# Patient Record
Sex: FEMALE | Race: WHITE | NOT HISPANIC OR LATINO | Employment: FULL TIME | URBAN - METROPOLITAN AREA
[De-identification: names, ages, dates, MRNs, and addresses within clinical notes are randomized per-mention and may not be internally consistent; named-entity substitution may affect disease eponyms.]

---

## 2017-01-17 ENCOUNTER — HOSPITAL ENCOUNTER (OUTPATIENT)
Dept: PHYSICAL THERAPY | Facility: CLINIC | Age: 62
Setting detail: THERAPIES SERIES
End: 2017-01-17
Attending: INTERNAL MEDICINE
Payer: COMMERCIAL

## 2017-01-17 PROCEDURE — 40000449 ZZHC STATISTIC PT VISIT, LYMPHEDEMA: Performed by: PHYSICAL THERAPIST

## 2017-01-17 PROCEDURE — 97535 SELF CARE MNGMENT TRAINING: CPT | Mod: GP | Performed by: PHYSICAL THERAPIST

## 2017-01-17 PROCEDURE — 97140 MANUAL THERAPY 1/> REGIONS: CPT | Mod: GP | Performed by: PHYSICAL THERAPIST

## 2017-01-17 PROCEDURE — 97110 THERAPEUTIC EXERCISES: CPT | Mod: GP | Performed by: PHYSICAL THERAPIST

## 2017-01-20 NOTE — ADDENDUM NOTE
Encounter addended by: Ira You PT on: 1/20/2017  7:43 AM<BR>     Documentation filed: Inpatient Document Flowsheet

## 2017-02-05 NOTE — PROGRESS NOTES
MEDICAL ONCOLOGY FOLLOW-UP PATIENT VISIT     NAME: Remedios Osorio     DATE: 2/6/2017    PRIMARY CARE PHYSICIAN: Kae Caal    PATIENT ID: Clinical Stage II-B Breast Cancer    Oncology History: Remedios Osorio is a 61 year old female with history of obesity, prediabetes and iron deficiency anemia with stage IIIa, Y1bE4xL2, ER positive, MA positive, HER2 non-amplified invasive ductal carcinoma of the left breast. Her PCP palpated a breast mass in the lower outer left breast in 01/2016. Diagnostic mammogram and ultrasound showed a hyperdense mass at 4:30, 8 cm from the nipple, measuring 3.3 cm on mammogram and 2.1 cm on ultrasound. She was also found to have multiple abnormal left axillary lymph nodes. The largest one was 2.4 cm. Core needle biopsy of her left breast mass demonstrated invasive ductal carcinoma, grade 3 with extensive tumor necrosis. Axillary LN biopsy was also positive for malignancy, consistent with breast primary. The tumor cells are strongly positive for estrogen receptor (> 95%) and are moderate to strongly positive for progesterone receptor (60-70%). HER2/lizz was non-amplified by FISH.  PET/CT showed the left breast mass, 5 hypermetabolic left axillary lymph nodes, indeterminate left cervical chain lymph nodes, and an indeterminate hypodensity in the dome of the liver, but was negative for distant metastasis. She declined enrollment in the I-SPY2 clinical trial and agreed to proceed with neoadjuvant chemotherapy, she received 11 cycles of weekly Taxol, the 12th was cancelled due to neuropathy. She received one cycle of ddAC but did not tolerate this well due to fatigue and generalized weakness. She declined any further chemotherapy.    Left breast lumpectomy and left axillary lymph node dissection was performed by Dr. Amezcua on 6/30/16.  Pathology showed a residual 1.6 cm grade 2, IDC in the left breast.  Invasive surgical margin was negative by 3 mm.  There was moderate intratumoral  fibrosis, with final tumor cellularity of 30%.  Tumor infiltrating lymphocytes were estimated at 50%.  6/19 excised lymph nodes were involved with malignancy.  The largest lymph node metastasis measured 3.9 cm and had a 1 mm area of extranodal extension.  Minimal treatment related changes were noted in the lymph nodes.  Pathologic staging was [id number], or stage IIIa.  [facility] residual cancer burden was Class III.  I met with Remedios in clinic on 7/11/16.  Unfortunately she was not eligible for ARIADNA due to having received only 13 weeks of neoadjuvant chemotherapy.  We discussed the results of the CREATE-X study (adjuvant Xeloda in patient w/o CR after nate-adjuvant tx), however, after extensive discussion she ultimately declined additional adjuvant chemotherapy.  She completed radiation on 10/17/16.  She has been on letrozole since early 09/2016.    Interim History:   Remedios comes into clinic for routine 3 month breast cancer follow up.  She generally feels well.  In fact, she states that she feels the best she has felt for the past 2 years.  She continues on letrozole and is tolerating the medication well.  She denies significant hot flashes.  She states she is not sexually active and has not noted vaginal dryness.  She denies mood disturbances.  She denies lumps or masses of either breast.  She has completed lymphedema therapy.  She is routinely wearing a compression sleeve and performing exercises to reduce lymphedema.  She reports almost full ROM of her LUE.  She continues on a ketogenic diet, but is not as strict as prior.  She has cut all refined sugar out of her diet including white flour, having replaced it with either almond flour or coconut flour.  She states she lost 40 pounds initially, but over the last 3 months, has maintained stable weight.  She has chronic bilateral knee pain that improved somewhat, but not completely with weight loss.  She denies new bone or joint aches or pains.  She has no cough,  shortness of breath, or chest pain.  She denies abdominal complaints.  She has persistent neuropathy of both the hands and feet, stating that they get cold very easy.  The remainder of a 10 point review of systems is otherwise negative.    PAST MEDICAL HISTORY:   Past Medical History   Diagnosis Date     HYPERLIPIDEMIA       ABNL LIVER FUNC STUDY  W/ MONO  5/01     Infectious mononucleosis 5/01     MENORRHAGIA      CHR BLOOD LOSS ANEMIA DUE TO FIBROIDS 6/9/2005     UTERINE LEIOMYOMA  2/7/2003     HX MUCOUS POLYPS OF CERVIX  2000     MIGRAINE HEADACHES      MORBID OBESITY BMI 44.79 6/05 6/12/2005     ELEVATED HBA1C 6.2% 6/05 8/6/2005     Breast cancer (H)        PAST SURGICAL HISTORY:  Past Surgical History   Procedure Laterality Date     Hc biopsy/excis cervical lesion w/wo fulguration  08-15-00,12-     endocervical polypectomy     Hc tooth extraction w/forcep       wisdom teeth     Lumpectomy breast with sentinel node, combined Left 6/29/2016     Segmental mastectomy with axillary lymph node dissection     Remove port vascular access Right 6/29/2016     Procedure: REMOVE PORT VASCULAR ACCESS;  Surgeon: Gianna Amezcua MD;  Location: [facility]     [facility] surgical pathology  2005     UTERINE LEIOMYOMA  [D25.9]     MEDS:  Current Outpatient Prescriptions   Medication     order for DME     letrozole (FEMARA) 2.5 MG tablet     Green Tea, Camillia sinensis, (GREEN TEA PO)     No current facility-administered medications for this visit.     Facility-Administered Medications Ordered in Other Visits   Medication     lidocaine BUFFERED 1 % solution 10 mL       ALLERGIES:  Allergies   Allergen Reactions     Benadryl [Diphenhydramine] Other (See Comments)     Pt had severe hypotension, nausea and vasovagal type reaction to IV Benadryl.   Give PO only.      Cats      Keflex [Cephalexin]      rash        PHYSICAL EXAM:  /78 mmHg  Pulse 77  Temp(Src) 97.2  F (36.2  C) (Oral)  Resp 16  Wt 91.672 kg (202 lb 1.6  oz)  SpO2 99%  LMP 10/28/2009  LMP 10/28/2009  General:  Well appearing, well-nourished adult female in NAD.  HEENT:  Normocephalic.  Sclera anicteric.  MMM.  No lesions of the oropharynx.  Lymph:  No palpable cervical, supraclavicular, or axillary LAD.  Chest:  CTA bilaterally.  No wheezes or crackles.  CV:  RRR.  Nl S1 and S2.  No m/r/g.  Breast:  Bilateral breasts are of normal fibroglandular density.  There is enhancement of pores and hyperpigmentation of the left breast in the distribution of the radiation field.  There are no discretely palpable masses in either breast.  Bilateral nipples are everted.  No nipple discharge.  Abd:  Soft/NT/ND.  BSs normoactive.  No hepatosplenomegaly.  Ext:  No pitting edema of the bilateral lower extremities.  Pulses 2+ and symmetric.  Musculo:  Strength 5/5 throughout.  No notable lymphedema of either the LUE or left chest wall.  Neuro:  Cranial nerves grossly intact.  Psych:  Mood and affect appear normal.    LABS REVIEWED THIS VISIT:  2/6/17 Bilateral diagnostic mammogram:  FINDINGS:  No suspicious findings in either breast. There is a benign  appearing postsurgical scar in the left central breast with no  suspicious findings.     There is skin thickening in the left breast consistent with  postsurgical or postradiation changes.                                                                       IMPRESSION: BI-RADS CATEGORY: 2 - Benign Finding(s)    IMPRESSION/PLAN: Remedios Osorio is a 61 year old female with history of obesity, prediabetes and iron deficiency anemia with a stage IIIa, A2bC6X3, ER/WA positive, HER2 negative left sided breast cancer. She is s/p 11 weeks of neoadjuvant weekly taxol and one cycle of ddAC. She declined further chemotherapy due to side effects.  Left breast lumpectomy and ALND on 6/30/16 showed a residual 1.6 cm IDC in the left breast and 6/19 lymph nodes involved with malignancy.  She declined adjuvant Xeloda.  She completed radiation on  10/17/2016.  She has been on letrozole since early September, 2016.      #  Stage III breast cancer:  Remedios is 7 months out from excision of her left sided breast cancer.  She has been adjuvant letrozole for 5 months.  She is tolerating the medication remarkably well.  We plan to continue letrozole to complete a total of 10 years of endocrine therapy (through 09/2026).  There is no evidence of disease recurrence on either clinical breast exam or bilateral mammogram performed today.  She will return to clinic in 3 months for her next exam.  We will continue annual screening mammogram in February of each year.    We again reviewed lifestyle changes suggested to benefit breast cancer patients including 150 minutes of cardiovascular exercise per week, a diet low in saturated fat and refined sugar, maintaining a normal BMI, a daily baby aspirin, vitamin D supplementation, and reduced alcohol intake.  Remedios is currently adhering to all of these recommendations.  I recommended that she be seen in survivor clinic at this time in order to obtain a treatment summary, review potential long term complications of chemotherapy, and further discuss lifestyle measures shown to decrease risk of recurrence.    #  Neuropathy:  Noted initial improvement, but none in the past 3 months.  Take vitamin B6 100 mg PO daily.  Deep massage to the fingers and toes daily.  She is not interested in acupuncture.    #  Lymphedema:  She has been seen by lymphedema clinic.  Continues compressive therapy and daily exercises.    #  Bone Health:  DEXA bone density scan performed in 10/2016 showed a lowest T-score of -0.5.  She is at increased risk of bone loss on aromatase inhibitor therapy.  Recommended continuing to take calcium 1200 mg and vitamin D 1000 IU PO daily.  We will check a vitamin D level at the time of her next visit.  Also recommended that she walk a half hour daily.  Today we discussed that once every 6 month zometa has been shown to both  reduce the risk of osteoporosis and decrease breast cancer recurrence in the bones in the adjuvant treatment of postmenopausal women with estrogen receptor positive breast cancer.  We discussed the potential side effects of zometa.  She was not interested in zometa infusion.      #  At risk for hypothyroidism:  She is s/p supraclavicular radiation and therefore at risk for hypothyroidism.  Will check a TSH at the time of her next visit.    #  Anthracycline exposure:  Remedios is at low risk of long term complications from anthracycline exposure given that she only received a single cycle, nevertheless, will check yearly CBC x 5 years and repeat an echocardiogram at 5 years.    #  Follow Up:  Survivor clinic visit with ANGÉLICA Guthrie within 1-2 months.  Return to clinic in 3 months for labs and visit with me.    It was a pleasure to see Remedios in clinic today.  A total of 25 minutes of our 30 minute face to face visit was spent in counseling.

## 2017-02-06 ENCOUNTER — ONCOLOGY VISIT (OUTPATIENT)
Dept: ONCOLOGY | Facility: CLINIC | Age: 62
End: 2017-02-06
Attending: INTERNAL MEDICINE
Payer: COMMERCIAL

## 2017-02-06 VITALS
SYSTOLIC BLOOD PRESSURE: 145 MMHG | RESPIRATION RATE: 16 BRPM | BODY MASS INDEX: 35.15 KG/M2 | HEART RATE: 77 BPM | TEMPERATURE: 97.2 F | WEIGHT: 202.1 LBS | OXYGEN SATURATION: 99 % | DIASTOLIC BLOOD PRESSURE: 78 MMHG

## 2017-02-06 DIAGNOSIS — Z92.3 HISTORY OF THYROID IRRADIATION: ICD-10-CM

## 2017-02-06 DIAGNOSIS — E55.9 VITAMIN D DEFICIENCY: ICD-10-CM

## 2017-02-06 DIAGNOSIS — C50.512 MALIGNANT NEOPLASM OF LOWER-OUTER QUADRANT OF LEFT FEMALE BREAST (H): Primary | ICD-10-CM

## 2017-02-06 PROCEDURE — 99212 OFFICE O/P EST SF 10 MIN: CPT

## 2017-02-06 PROCEDURE — 99214 OFFICE O/P EST MOD 30 MIN: CPT | Mod: ZP | Performed by: INTERNAL MEDICINE

## 2017-02-06 PROCEDURE — 99211 OFF/OP EST MAY X REQ PHY/QHP: CPT | Mod: 25,ZF

## 2017-02-06 ASSESSMENT — PAIN SCALES - GENERAL: PAINLEVEL: NO PAIN (0)

## 2017-02-06 NOTE — Clinical Note
2/6/2017       RE: Remedios Osorio  [address] [id number]     Dear Colleague,    Thank you for referring your patient, Remedios Osorio, to the [facility]. Please see a copy of my visit note below.    MEDICAL ONCOLOGY FOLLOW-UP PATIENT VISIT     NAME: Remedios Osorio     DATE: 2/6/2017    PRIMARY CARE PHYSICIAN: Kae Caal    PATIENT ID: Clinical Stage II-B Breast Cancer    Oncology History: Remedios Osorio is a 61 year old female with history of obesity, prediabetes and iron deficiency anemia with stage IIIa, Q2mQ4iI5, ER positive, [place] positive, HER2 non-amplified invasive ductal carcinoma of the left breast. Her PCP palpated a breast mass in the lower outer left breast in 01/2016. Diagnostic mammogram and ultrasound showed a hyperdense mass at 4:30, 8 cm from the nipple, measuring 3.3 cm on mammogram and 2.1 cm on ultrasound. She was also found to have multiple abnormal left axillary lymph nodes. The largest one was 2.4 cm. Core needle biopsy of her left breast mass demonstrated invasive ductal carcinoma, grade 3 with extensive tumor necrosis. Axillary LN biopsy was also positive for malignancy, consistent with breast primary. The tumor cells are strongly positive for estrogen receptor (> 95%) and are moderate to strongly positive for progesterone receptor (60-70%). HER2/lizz was non-amplified by FISH.  PET/CT showed the left breast mass, 5 hypermetabolic left axillary lymph nodes, indeterminate left cervical chain lymph nodes, and an indeterminate hypodensity in the dome of the liver, but was negative for distant metastasis. She declined enrollment in the I-SPY2 clinical trial and agreed to proceed with neoadjuvant chemotherapy, she received 11 cycles of weekly Taxol, the 12th was cancelled due to neuropathy. She received one cycle of ddAC but did not tolerate this well due to fatigue and generalized weakness. She declined any further chemotherapy.    Left breast  lumpectomy and left axillary lymph node dissection was performed by Dr. Amezcua on 6/30/16.  Pathology showed a residual 1.6 cm grade 2, IDC in the left breast.  Invasive surgical margin was negative by 3 mm.  There was moderate intratumoral fibrosis, with final tumor cellularity of 30%.  Tumor infiltrating lymphocytes were estimated at 50%.  6/19 excised lymph nodes were involved with malignancy.  The largest lymph node metastasis measured 3.9 cm and had a 1 mm area of extranodal extension.  Minimal treatment related changes were noted in the lymph nodes.  Pathologic staging was [id number], or stage IIIa.  [facility] residual cancer burden was Class III.  I met with Remedios in clinic on 7/11/16.  Unfortunately she was not eligible for ARIADNA due to having received only 13 weeks of neoadjuvant chemotherapy.  We discussed the results of the CREATE-X study (adjuvant Xeloda in patient w/o CR after nate-adjuvant tx), however, after extensive discussion she ultimately declined additional adjuvant chemotherapy.  She completed radiation on 10/17/16.  She has been on letrozole since early 09/2016.    Interim History:   Remedios comes into clinic for routine 3 month breast cancer follow up.  She generally feels well.  In fact, she states that she feels the best she has felt for the past 2 years.  She continues on letrozole and is tolerating the medication well.  She denies significant hot flashes.  She states she is not sexually active and has not noted vaginal dryness.  She denies mood disturbances.  She denies lumps or masses of either breast.  She has completed lymphedema therapy.  She is routinely wearing a compression sleeve and performing exercises to reduce lymphedema.  She reports almost full ROM of her LUE.  She continues on a ketogenic diet, but is not as strict as prior.  She has cut all refined sugar out of her diet including white flour, having replaced it with either almond flour or coconut flour.  She states she lost 40  pounds initially, but over the last 3 months, has maintained stable weight.  She has chronic bilateral knee pain that improved somewhat, but not completely with weight loss.  She denies new bone or joint aches or pains.  She has no cough, shortness of breath, or chest pain.  She denies abdominal complaints.  She has persistent neuropathy of both the hands and feet, stating that they get cold very easy.  The remainder of a 10 point review of systems is otherwise negative.    PAST MEDICAL HISTORY:   Past Medical History   Diagnosis Date     HYPERLIPIDEMIA       ABNL LIVER FUNC STUDY  W/ MONO  5/01     Infectious mononucleosis 5/01     MENORRHAGIA      CHR BLOOD LOSS ANEMIA DUE TO FIBROIDS 6/9/2005     UTERINE LEIOMYOMA  2/7/2003     HX MUCOUS POLYPS OF CERVIX  2000     MIGRAINE HEADACHES      MORBID OBESITY BMI 44.79 6/05 6/12/2005     ELEVATED HBA1C 6.2% 6/05 8/6/2005     Breast cancer (H)        PAST SURGICAL HISTORY:  Past Surgical History   Procedure Laterality Date     Hc biopsy/excis cervical lesion w/wo fulguration  08-15-00,12-     endocervical polypectomy     Hc tooth extraction w/forcep       wisdom teeth     Lumpectomy breast with sentinel node, combined Left 6/29/2016     Segmental mastectomy with axillary lymph node dissection     Remove port vascular access Right 6/29/2016     Procedure: REMOVE PORT VASCULAR ACCESS;  Surgeon: Gianna Amezcua MD;  Location:  OR     [facility] surgical pathology  2005     UTERINE LEIOMYOMA  [D25.9]     MEDS:  Current Outpatient Prescriptions   Medication     order for DME     letrozole (FEMARA) 2.5 MG tablet     Green Tea, Camillia sinensis, (GREEN TEA PO)     No current facility-administered medications for this visit.     Facility-Administered Medications Ordered in Other Visits   Medication     lidocaine BUFFERED 1 % solution 10 mL       ALLERGIES:  Allergies   Allergen Reactions     Benadryl [Diphenhydramine] Other (See Comments)     Pt had severe  hypotension, nausea and vasovagal type reaction to IV Benadryl.   Give PO only.      Cats      Keflex [Cephalexin]      rash        PHYSICAL EXAM:  /78 mmHg  Pulse 77  Temp(Src) 97.2  F (36.2  C) (Oral)  Resp 16  Wt 91.672 kg (202 lb 1.6 oz)  SpO2 99%  LMP 10/28/2009  LMP 10/28/2009  General:  Well appearing, well-nourished adult female in NAD.  HEENT:  Normocephalic.  Sclera anicteric.  MMM.  No lesions of the oropharynx.  Lymph:  No palpable cervical, supraclavicular, or axillary LAD.  Chest:  CTA bilaterally.  No wheezes or crackles.  CV:  RRR.  Nl S1 and S2.  No m/r/g.  Breast:  Bilateral breasts are of normal fibroglandular density.  There is enhancement of pores and hyperpigmentation of the left breast in the distribution of the radiation field.  There are no discretely palpable masses in either breast.  Bilateral nipples are everted.  No nipple discharge.  Abd:  Soft/NT/ND.  BSs normoactive.  No hepatosplenomegaly.  Ext:  No pitting edema of the bilateral lower extremities.  Pulses 2+ and symmetric.  Musculo:  Strength 5/5 throughout.  No notable lymphedema of either the LUE or left chest wall.  Neuro:  Cranial nerves grossly intact.  Psych:  Mood and affect appear normal.    LABS REVIEWED THIS VISIT:  2/6/17 Bilateral diagnostic mammogram:  FINDINGS:  No suspicious findings in either breast. There is a benign  appearing postsurgical scar in the left central breast with no  suspicious findings.     There is skin thickening in the left breast consistent with  postsurgical or postradiation changes.                                                                       IMPRESSION: BI-RADS CATEGORY: 2 - Benign Finding(s)    IMPRESSION/PLAN: Remedios Osorio is a 61 year old female with history of obesity, prediabetes and iron deficiency anemia with a stage IIIa, H3eI7P7, ER/MI positive, HER2 negative left sided breast cancer. She is s/p 11 weeks of neoadjuvant weekly taxol and one cycle of ddAC. She  declined further chemotherapy due to side effects.  Left breast lumpectomy and ALND on 6/30/16 showed a residual 1.6 cm IDC in the left breast and 6/19 lymph nodes involved with malignancy.  She declined adjuvant Xeloda.  She completed radiation on 10/17/2016.  She has been on letrozole since early September, 2016.      #  Stage III breast cancer:  Remedios is 7 months out from excision of her left sided breast cancer.  She has been adjuvant letrozole for 5 months.  She is tolerating the medication remarkably well.  We plan to continue letrozole to complete a total of 10 years of endocrine therapy (through 09/2026).  There is no evidence of disease recurrence on either clinical breast exam or bilateral mammogram performed today.  She will return to clinic in 3 months for her next exam.  We will continue annual screening mammogram in February of each year.    We again reviewed lifestyle changes suggested to benefit breast cancer patients including 150 minutes of cardiovascular exercise per week, a diet low in saturated fat and refined sugar, maintaining a normal BMI, a daily baby aspirin, vitamin D supplementation, and reduced alcohol intake.  Remedios is currently adhering to all of these recommendations.  I recommended that she be seen in survivor clinic at this time in order to obtain a treatment summary, review potential long term complications of chemotherapy, and further discuss lifestyle measures shown to decrease risk of recurrence.    #  Neuropathy:  Noted initial improvement, but none in the past 3 months.  Take vitamin B6 100 mg PO daily.  Deep massage to the fingers and toes daily.  She is not interested in acupuncture.    #  Lymphedema:  She has been seen by lymphedema clinic.  Continues compressive therapy and daily exercises.    #  Bone Health:  DEXA bone density scan performed in 10/2016 showed a lowest T-score of -0.5.  She is at increased risk of bone loss on aromatase inhibitor therapy.  Recommended  continuing to take calcium 1200 mg and vitamin D 1000 IU PO daily.  We will check a vitamin D level at the time of her next visit.  Also recommended that she walk a half hour daily.  Today we discussed that once every 6 month zometa has been shown to both reduce the risk of osteoporosis and decrease breast cancer recurrence in the bones in the adjuvant treatment of postmenopausal women with estrogen receptor positive breast cancer.  We discussed the potential side effects of zometa.  She was not interested in zometa infusion.      #  At risk for hypothyroidism:  She is s/p supraclavicular radiation and therefore at risk for hypothyroidism.  Will check a TSH at the time of her next visit.    #  Anthracycline exposure:  Remedios is at low risk of long term complications from anthracycline exposure given that she only received a single cycle, nevertheless, will check yearly CBC x 5 years and repeat an echocardiogram at 5 years.    #  Follow Up:  Survivor clinic visit with ANGÉLICA Guthrie within 1-2 months.  Return to clinic in 3 months for labs and visit with me.    It was a pleasure to see Remedios in clinic today.  A total of 25 minutes of our 30 minute face to face visit was spent in counseling.      Again, thank you for allowing me to participate in the care of your patient.      Sincerely,    Kanchan Patel MD

## 2017-02-06 NOTE — MR AVS SNAPSHOT
After Visit Summary   2/6/2017    Remedios Osorio    MRN: [id number]           Patient Information     Date Of Birth          1955        Visit Information        Provider Department      2/6/2017 1:45 PM Kanchan Patel MD [facility]        Today's Diagnoses     Malignant neoplasm of lower-outer quadrant of left female breast (H)    -  1     Vitamin D deficiency         History of thyroid irradiation            Follow-ups after your visit        Your next 10 appointments already scheduled     Apr 11, 2017  8:00 AM   Treatment 60 with Ira You, PT   [facility] [place] [facility] ([facility], [facility])    [address]  1st Floor  [address]  [facility] [address] [phone]            May 08, 2017  2:45 PM   [facility] Lab Draw with  Lol[employer] LAB DRAW   [facility] ([facility])    [address]  2nd Floor  [facility] [address] [phone]            May 08, 2017  3:15 PM   (Arrive by 3:00 PM)   Return Visit with Kanchan Patel MD   [facility] ([facility])    [address]  2nd Floor  [facility] [id number] [phone]            May 17, 2017  3:30 PM   Return Visit with Niesha Anne MD   Radiation Oncology Clinic ([facility])    [facility]  1st Floor  [address] [facility] [address] [phone]              Future tests that were ordered for you today     Open Future Orders        Priority Expected Expires Ordered    CBC with platelets differential Routine 5/8/2017 2/5/2018 2/5/2017    Comprehensive metabolic panel Routine 5/8/2017 2/5/2018 2/5/2017    Vitamin D deficiency screening Routine 5/8/2017 2/5/2018 2/5/2017    TSH with free T4 reflex Routine 5/8/2017 2/5/2018 2/5/2017            Who to contact     If you have questions or  need follow up information about today's clinic visit or your schedule please contact [facility] directly at [phone].  Normal or non-critical lab and imaging results will be communicated to you by Diagnovushart, letter or phone within 4 business days after the clinic has received the results. If you do not hear from us within 7 days, please contact the clinic through Diagnovushart or phone. If you have a critical or abnormal lab result, we will notify you by phone as soon as possible.  Submit refill requests through [employer] or call your pharmacy and they will forward the refill request to us. Please allow 3 business days for your refill to be completed.          Additional Information About Your Visit        [employer]ovushar[employer]ee[employer] Information     [employer] gives you secure access to your electronic health record. If you see a primary care provider, you can also send messages to your care team and make appointments. If you have questions, please call your primary care clinic.  If you do not have a primary care provider, please call [phone] and they will assist you.        Care EveryWhere ID     This is your Care EveryWhere ID. This could be used by other organizations to access your [facility] [place] medical records  [id number]        Your Vitals Were     Pulse Temperature Respirations Pulse Oximetry Last Period       77 97.2  F (36.2  C) (Oral) 16 99% 10/28/2009        Blood Pressure from Last 3 Encounters:   02/06/17 145/78   11/16/16 126/75   11/08/16 116/71    Weight from Last 3 Encounters:   02/06/17 91.672 kg (202 lb 1.6 oz)   11/16/16 92.307 kg (203 lb 8 oz)   11/08/16 92.897 kg (204 lb 12.8 oz)                 Today's Medication Changes          These changes are accurate as of: 2/6/17  2:28 PM.  If you have any questions, ask your nurse or doctor.               Stop taking these medicines if you haven't already. Please contact your care team if you have questions.     GREEN TEA PO   Stopped by:  Kanchan Patel  MD Michelle                    Primary Care Provider Office Phone # Fax #    Kae Luis Caal [phone]       [facility] [address] [place] [id number]        Thank you!     Thank you for choosing [facility]  for your care. Our goal is always to provide you with excellent care. Hearing back from our patients is one way we can continue to improve our services. Please take a few minutes to complete the written survey that you may receive in the mail after your visit with us. Thank you!             Your Updated Medication List - Protect others around you: Learn how to safely use, store and throw away your medicines at www.disposemymeds.org.          This list is accurate as of: 2/6/17  2:28 PM.  Always use your most recent med list.                   Brand Name Dispense Instructions for use    letrozole 2.5 MG tablet    FEMARA    30 tablet    Take 1 tablet (2.5 mg) by mouth daily       order for DME     1 each    Equipment being ordered: Sit Stand Desk

## 2017-02-06 NOTE — NURSING NOTE
"Remedios Osorio is a 61 year old female who presents for:  Chief Complaint   Patient presents with     Oncology Clinic Visit     Return: Breast ca         Initial Vitals:  /78 mmHg  Pulse 77  Temp(Src) 97.2  F (36.2  C) (Oral)  Resp 16  Wt 91.672 kg (202 lb 1.6 oz)  SpO2 99%  LMP 10/28/2009 Estimated body mass index is 35.15 kg/(m^2) as calculated from the following:    Height as of 10/10/16: 1.615 m (5' 3.58\").    Weight as of this encounter: 91.672 kg (202 lb 1.6 oz).. There is no height on file to calculate BSA. BP completed using cuff size: large  No Pain (0) Patient's last menstrual period was 10/28/2009. Allergies and medications reviewed.     Medications: Medication refills not needed today.  Pharmacy name entered into Storypanda:    [facility] - [place], [place] - [address].  [id number] - [place], [place] - [address]  [facility] [address] [place], [place] - [address] [facility] [place] - [address] [facility] [place], [place] - [address] [facility] [place], [place] - [address]  [facility] [place], [place] - [address] [facility]    Comments: Patient was offered a flu vaccine today but declined.      6 minutes for nursing intake (face to face time)   Leonor Chandler CMA          "

## 2017-03-30 ENCOUNTER — ONCOLOGY VISIT (OUTPATIENT)
Dept: ONCOLOGY | Facility: CLINIC | Age: 62
End: 2017-03-30
Attending: PHYSICIAN ASSISTANT
Payer: COMMERCIAL

## 2017-03-30 VITALS
WEIGHT: 211.1 LBS | DIASTOLIC BLOOD PRESSURE: 84 MMHG | OXYGEN SATURATION: 98 % | BODY MASS INDEX: 36.71 KG/M2 | SYSTOLIC BLOOD PRESSURE: 119 MMHG | HEART RATE: 99 BPM | TEMPERATURE: 99 F | RESPIRATION RATE: 16 BRPM

## 2017-03-30 DIAGNOSIS — C50.512 MALIGNANT NEOPLASM OF LOWER-OUTER QUADRANT OF LEFT FEMALE BREAST (H): Primary | ICD-10-CM

## 2017-03-30 PROCEDURE — 99212 OFFICE O/P EST SF 10 MIN: CPT | Mod: ZF

## 2017-03-30 PROCEDURE — 99215 OFFICE O/P EST HI 40 MIN: CPT | Mod: ZP | Performed by: PHYSICIAN ASSISTANT

## 2017-03-30 ASSESSMENT — PAIN SCALES - GENERAL: PAINLEVEL: NO PAIN (0)

## 2017-03-30 NOTE — MR AVS SNAPSHOT
After Visit Summary   3/30/2017    Remedios Osorio    MRN: [id number]           Patient Information     Date Of Birth          1955        Visit Information        Provider Department      3/30/2017 9:30 AM Dary Schultz PA-C [facility]        Today's Diagnoses     Malignant neoplasm of lower-outer quadrant of left female breast (H)    -  1       Follow-ups after your visit        Your next 10 appointments already scheduled     Apr 11, 2017  8:00 AM CDT   Treatment 60 with Ira You, PT   [facility] [place] [facility] ([facility], [facility])    [address]  1st Floor  [address]  [facility] [id number] [phone]            May 08, 2017  2:45 PM CDT   [facility] Lab Draw with  2[employer] LAB DRAW   [facility] Lab Draw ([facility])    [address]  2nd Floor  [facility] [id number] [phone]            May 08, 2017  3:15 PM CDT   (Arrive by 3:00 PM)   Return Visit with Kanchan Patel MD   [facility] ([facility])    [address]  2nd Floor  [facility] [id number] [phone]            May 17, 2017  3:30 PM CDT   Return Visit with Niesha Anne MD   Radiation Oncology Clinic ([facility])    General acute hospital  1st Floor  [address] [facility] [id number] [phone]              Who to contact     If you have questions or need follow up information about today's clinic visit or your schedule please contact [facility] directly at [phone].  Normal or non-critical lab and imaging results will be communicated to you by MyChart, letter or phone within 4 business days after the clinic has received the results. If you do not hear from us within 7 days, please contact the clinic through MyChart or phone. If you have a critical or abnormal lab  result, we will notify you by phone as soon as possible.  Submit refill requests through [employer] or call your pharmacy and they will forward the refill request to us. Please allow 3 business days for your refill to be completed.          Additional Information About Your Visit        Chang[employer] Information     [employer] gives you secure access to your electronic health record. If you see a primary care provider, you can also send messages to your care team and make appointments. If you have questions, please call your primary care clinic.  If you do not have a primary care provider, please call [phone] and they will assist you.        Care EveryWhere ID     This is your Care EveryWhere ID. This could be used by other organizations to access your [facility] medical records  [id number]        Your Vitals Were     Pulse Temperature Respirations Last Period Pulse Oximetry BMI (Body Mass Index)    99 99  F (37.2  C) (Oral) 16 10/28/2009 98% 36.71 kg/m2       Blood Pressure from Last 3 Encounters:   03/30/17 119/84   02/06/17 145/78   11/16/16 126/75    Weight from Last 3 Encounters:   03/30/17 95.8 kg (211 lb 1.6 oz)   02/06/17 91.7 kg (202 lb 1.6 oz)   11/16/16 92.3 kg (203 lb 8 oz)              Today, you had the following     No orders found for display       Primary Care Provider Office Phone # Fax #    Kae Luis Caal [phone]       [address] [address]        Thank you!     Thank you for choosing [facility]  for your care. Our goal is always to provide you with excellent care. Hearing back from our patients is one way we can continue to improve our services. Please take a few minutes to complete the written survey that you may receive in the mail after your visit with us. Thank you!             Your Updated Medication List - Protect others around you: Learn how to safely use, store and throw away your medicines at  www.disposemymeds.org.          This list is accurate as of: 3/30/17 11:59 PM.  Always use your most recent med list.                   Brand Name Dispense Instructions for use    letrozole 2.5 MG tablet    FEMARA    30 tablet    Take 1 tablet (2.5 mg) by mouth daily       order for DME     1 each    Equipment being ordered: Sit Stand Desk

## 2017-03-30 NOTE — NURSING NOTE
"Remedios Osorio is a 61 year old female who presents for:  Chief Complaint   Patient presents with     Oncology Clinic Visit     Breast CA        Initial Vitals:  /84  Pulse 99  Temp 99  F (37.2  C) (Oral)  Resp 16  Wt 95.8 kg (211 lb 1.6 oz)  LMP 10/28/2009  SpO2 98%  BMI 36.71 kg/m2 Estimated body mass index is 36.71 kg/(m^2) as calculated from the following:    Height as of 10/10/16: 1.615 m (5' 3.58\").    Weight as of this encounter: 95.8 kg (211 lb 1.6 oz).. Body surface area is 2.07 meters squared. BP completed using cuff size: large  No Pain (0) Patient's last menstrual period was 10/28/2009. Allergies and medications reviewed.      Medications: Medication refills not needed today.  Pharmacy name entered into Moove In:    [facility] - [place], [place] - [address].  [id number] - [place], [place] - [address]  [id number] - [place], [place] - [address] [facility] [place] - [address] [facility] [place] [facility] [address] [facility] [place] - [place], [place] - [address]  [facility] - [place], [place] - [address] [facility]    Comments:     6 minutes for nursing intake (face to face time)   Mary Hennessy CMA        "

## 2017-03-30 NOTE — PROGRESS NOTES
REASON FOR VISIT:  I am seeing Ms. Osorio in the Cancer Survivorship Clinic for her diagnosis of left breast cancer in 2016.       The patient had noted a lump in her left breast.  She had a bilateral diagnostic mammogram with an ultrasound on 01/25/2016.  This showed an irregular shaped mass measuring 3.3 cm.  An ultrasound showed a 2.1-cm mass with enlarged left axillary lymph nodes.  Contrast mammogram on 02/03/2016 showed a 3-cm enhancing mass.  She had a biopsy of the left breast and axilla on 02/03/2016.  The left breast showed invasive mammary carcinoma, Bartlett grade 3, ER/MA-positive, HER2/lizz-negative.  The axilla was positive for metastatic cancer.  PET/CT scan on 02/20/2016 showed the left breast mass and lymph nodes, but no distant disease.  She was diagnosed with a clinical stage II (T2 N1) left breast cancer.  She received neoadjuvant weekly Taxol x11 from 03/04/2016 until 05/10/2016.  This was discontinued secondary to neuropathy.  She received 1 cycle of Adriamycin and Cytoxan on 05/25/2016 but not tolerate this.  Total accumulation dose of Adriamycin was 60 mg/m2.  She went on to have a left lumpectomy and left axillary lymph node dissection on 06/30/2016.  This revealed a 16-mm residual infiltrating ductal carcinoma, Florence grade 2.  Six out of 19 lymph nodes were positive.  There were clear margins.  She has RCB class III.  She received left breast and regional lymph node radiation, completing 6,040 cGy from 08/29/2016 to 10/17/2016.  She began letrozole in 09/2016.       Ms. Osorio states that she has noticed some slight edema near her left supraclavicular region.  She is getting out and walking 4 days a week.  She is walking 30-minute sessions.  She has been trying to lose weight on purpose and has changed her diet and began exercising.  She did note nausea about a week ago, but no vomiting.  The symptom has resolved at this time.  She does have joint aches and pains, most prominently in  her right hip and knees.  She also has some discomfort in her right neck but believes this is from lying on the right side.  She was getting migraines, but they have been better since menopause.  She is otherwise eating and drinking fine.  She denies any chest pain, shortness of breath, cough or abdominal pain.     CANCER TREATMENT SUMMARY:  *The patient had noted a lump in her left breast.    *Bilateral diagnostic mammogram with an ultrasound on 01/25/2016.  This showed an irregular shaped mass measuring 3.3 cm.  An ultrasound showed a 2.1-cm mass with enlarged left axillary lymph nodes.    *Contrast mammogram on 02/03/2016 showed a 3-cm enhancing mass.    *Biopsy of the left breast and axilla on 02/03/2016.  The left breast showed invasive mammary carcinoma, Florence grade 3, ER/OH-positive, HER2/lizz-negative.  The axilla was positive for metastatic cancer.    *PET/CT scan on 02/20/2016 showed the left breast mass and lymph nodes, but no distant disease.    *Neoadjuvant weekly Taxol x11 from 03/04/2016 until 05/10/2016.  This was discontinued secondary to neuropathy.    *1 cycle of Adriamycin and Cytoxan on 05/25/2016 but not tolerate this.   *Left lumpectomy and left axillary lymph node dissection on 06/30/2016.  This revealed a 16-mm residual infiltrating ductal carcinoma, Indianapolis grade 2.  Six out of 19 lymph nodes were positive.  There were clear margins.  She has RCB class III.    *Left breast and regional lymph node radiation, completing 6,040 cGy from 08/29/2016 to 10/17/2016.    *Letrozole 09/2016 to present.    MEDICATIONS:   Current Outpatient Prescriptions   Medication Sig Dispense Refill     order for DME Equipment being ordered: Sit Stand Desk 1 each 0     letrozole (FEMARA) 2.5 MG tablet Take 1 tablet (2.5 mg) by mouth daily 30 tablet 11       ALLERGIES:   Allergies   Allergen Reactions     Benadryl [Diphenhydramine] Other (See Comments)     Pt had severe hypotension, nausea and vasovagal type  reaction to IV Benadryl.   Give PO only.      Cats      Keflex [Cephalexin]      rash     FAMILY HISTORY:  Mother is  at age 88 from Alzheimer's.  Father is  at age 88 from congestive heart failure.  Cancer family history includes dad with prostate cancer.     PHYSICAL EXAM:  Vitals: /84  Pulse 99  Temp 99  F (37.2  C) (Oral)  Resp 16  Wt 95.8 kg (211 lb 1.6 oz)  LMP 10/28/2009  SpO2 98%  BMI 36.71 kg/m2  GENERAL:  A pleasant person in no acute distress.   HEENT:  Sclerae are nonicteric.    NECK:  Supple.   NEUROLOGICAL:  Alert/orientated/able to answer all questions.  CN grossly intact.  PSYCH:  Normal affect.  SKIN:  No suspicious lesions on areas of exposed skin.  BREAST:  Right breast normal to inspection, no masses.  Left breast, edema and hyperpigmentation secondary to treatment.  Well healed surgical incision in the 5 o'clock position.  No masses.   SKIN:  Slight edema in the area to the left of the thyroid region, no masses or adenopathy.    ASSESSMENT/PLAN:  I had the pleasure of seeing Ms. Osorio in the Cancer Survivorship Program given her left breast cancer.  Given her diagnosis and treatment, I gave her the following recommendations:     1.  Clinical stage II (T2 N1) invasive ductal carcinoma of the left breast, ER/NH-positive, HER2/lizz-negative.  She was treated as above with neoadjuvant chemotherapy, surgery, radiation and hormonal therapy.  Ms. Osorio continues to do well at this time.  She does not have any signs or symptoms that would suggest recurrence of her breast carcinoma.  She had her mammogram in 2017 which showed no concerns.  She understands that she will see Dr. Patel every 3-4 months for the first 2-3 years following her breast cancer diagnosis.  She already has a followup appointment scheduled with Dr. Patel in May.    2.  Coping.  Overall, she states that she is coping well with her breast cancer diagnosis.  She says she has a good support system  at home.    3.  Energy.  She has noticed a little less energy more recently but states that she had been back to baseline following the treatment for her breast cancer.  We will continue to follow at subsequent visits.  TSH is on order for May.    4.  Peripheral neuropathy.  She had this as a side effect from the Taxol chemotherapy.  It continues to be stable and involves numbness in her fingertips and from the balls of her feet to her toes.  She has no pain with the peripheral neuropathy.  She understands that this may be a permanent side effect from the chemotherapy.  She may try acupuncture.   5.  Lymphedema.  She does plan to see a lymphedema specialist in April or May.  She is not having any difficulty with lymphedema at this time.    6.  Cardiotoxicity.  She received 1 cycle of Adriamycin and left breast radiation.  She may be at risk for cardiovascular disease in the future.  I did ask her to establish with a primary care provider for aggressive management and treatment of her cholesterol, blood pressure and glucose.    7.  Bone health.  DEXA scan in 10/2016 was consistent with normal bone density.  Given the use of letrozole, we will obtain a bone density every 2 years.  I encouraged her to continue her weightbearing exercises 2-3 days a week.  Her calcium intake should be 2085-8380 mg along with vitamin D.     8.  Letrozole.  She does complain of some vaginal dryness.  She is not sexually active.  I suggested over-the-counter Replens, Luvena or Hyalo-Gyn to help with the vaginal dryness.  She has mild hot flashes, but they are tolerable.  She understands that her current joint aches and pains may be related to the letrozole, and she will continue to follow.  If they worsen, we could consider changing the aromatase inhibitor.   9.  Hypothyroidism.  Given that her radiation was near her thyroid region, Dr. Patel will be obtaining a TSH yearly.  We will plan to treat if she becomes hypothyroid.    10.  Risk  of a blood cancer.  There is a small risk of MDS or leukemia given her exposure to the Adriamycin.  Dr. Patel plans to obtain a CBC and do this once a year.     11.  Cancer screening.  I encouraged her to continue with her regular cancer screening.  She does need a Pap and pelvic exam, as she states she has not had one since 2005.  She understands that vaginal bleeding would be abnormal and she would need to have this evaluated.  At the age of 61 she has not yet had a colonoscopy and I encouraged her to schedule one.  She will discuss this with her primary care provider.  She should limit her sun exposure and use sunscreens.   12.  Healthy lifestyle.  She should refrain from smoking.  Her diet should include low fats.  Her BMI should be between 20 and 25.  She should exercise at least 150 minutes of cardiovascular exercise per week.  She does need to establish with a primary care provider for aggressive management and treatment of her cholesterol, blood pressure and glucose.  I did recommend the annual influenza vaccine.  When age appropriate, she should receive the pneumococcal vaccine.  She should continue to see her eye doctor every 1-2 years.  She should see her dentist at least yearly.       If there are no interval concerns, the patient will follow up with Dr. Patel in May.     I spent 45 minutes with this patient, over 50% in counseling and coordination of care.

## 2017-03-30 NOTE — Clinical Note
3/30/2017       RE: Remedios Osorio  [address] [address]     Dear Colleague,    Thank you for referring your patient, Remedios Osorio, to the [facility]. Please see a copy of my visit note below.    REASON FOR VISIT:   Dictation on: 03/30/2017 10:28 AM by: DARY SNIDER [[id number]]       CANCER TREATMENT SUMMARY:    MEDICATIONS:   Current Outpatient Prescriptions   Medication Sig Dispense Refill     order for DME Equipment being ordered: Sit Stand Desk 1 each 0     letrozole (FEMARA) 2.5 MG tablet Take 1 tablet (2.5 mg) by mouth daily 30 tablet 11       ALLERGIES:   Allergies   Allergen Reactions     Benadryl [Diphenhydramine] Other (See Comments)     Pt had severe hypotension, nausea and vasovagal type reaction to IV Benadryl.   Give PO only.      Cats      Keflex [Cephalexin]      rash     FAMILY HISTORY:   Dictation on: 03/30/2017 10:29 AM by: DARY SNIDER [[id number]]     PHYSICAL EXAM:  Vitals: /84  Pulse 99  Temp 99  F (37.2  C) (Oral)  Resp 16  Wt 95.8 kg (211 lb 1.6 oz)  LMP 10/28/2009  SpO2 98%  BMI 36.71 kg/m2  GENERAL:  A pleasant person in no acute distress.   HEENT:  Sclerae are nonicteric.    NECK:  Supple.   NEUROLOGICAL:  Alert/orientated/able to answer all questions.  CN grossly intact.  PSYCH:  Normal affect.  SKIN:  No suspicious lesions on areas of exposed skin.  BREAST:  Right breast normal to inspection, no masses.  Left breast, edema and hyperpigmentation secondary to treatment.  Well healed surgical incision in the 5 o'clock position.  No masses.   SKIN:  Slight edema in the area to the left of the thyroid region, no masses or adenopathy.    ASSESSMENT/PLAN:   Dictation on: 03/30/2017 10:35 AM by: DARY SNIDER [[id number]]     I spent 45 minutes with this patient, over 50% in counseling and coordination of care.   Dary Snider PA-C    I am seeing Ms. Osorio in the Cancer Survivorship Clinic for her diagnosis of left breast  cancer in 2016.       The patient had noted a lump in her left breast.  She had a bilateral diagnostic mammogram with an ultrasound on 01/25/2016.  This showed an irregular shaped mass measuring 3.3 cm.  An ultrasound showed a 2.1-cm mass with enlarged left axillary lymph nodes.  Contrast mammogram on 02/03/2016 showed a 3-cm enhancing mass.  She had a biopsy of the left breast and axilla on 02/03/2016.  The left pressure invasive mammary carcinoma, Dan[place] grade 3, ER/LA-positive, HER2/lizz-negative.  The axilla was positive for metastatic cancer.  PET/CT scan on 02/20/2016 showed the left breast mass and lymph nodes, but no distant disease.  She was diagnosed with a clinical stage II (T2 N1) left breast cancer.  She received neoadjuvant weekly Taxol x11 from 03/03/2006 until 05/10/2016.  This was discontinued secondary to neuropathy.  She received 1 cycle of Adriamycin and Cytoxan on 05/25/2016 but not tolerate this.  She went on to have a left lobectomy and left axillary lymph node dissection on 06/30/2016.  This revealed a 16-mm residual infiltrating ductal carcinoma, [place] grade 2.  Six out of 19 lymph nodes were positive.  There were clear margins.  She has RCB class of III.  She received left breast and regional lymph node radiation, completing 6,040 cGy from 08/29/2016 to 11/17/2016.  She began letrozole in 09/2016.       Ms. Osorio states that she has noticed some slight edema near her left supraclavicular region.  She is getting out on a walking program 4 days a week.  She is walking 30-minute sessions.  She has been trying to lose weight on purpose and has changed her diet and began exercising.  She did note nausea about a week ago, but no vomiting.  The symptom has resolved at this time.  She does have joint aches and pains, most prominently in her right hip and knees.  She also has some discomfort in her right neck but believes this is from lying on the right side.  She was getting migraines, but  they have been better since menopause.  She is otherwise eating and drinking fine.  She denies any chest pain, shortness of breath, cough or abdominal pain.     Mother is  at age 88 from Alzheimer's.  Father is  at age 88 from congestive heart failure.  Cancer family history includes dad with prostate cancer.     I had the pleasure of seeing Ms. Osorio in the Cancer Survivorship Program given her left breast cancer.  Given her diagnosis and treatment, I gave her the following recommendations:     1.  Clinical stage II (T2 N1) invasive ductal carcinoma of the left breast, ER/WI-positive, HER2/lizz-negative.  She was treated as above with neoadjuvant chemotherapy, surgery, radiation and hormonal therapy.  Ms. Osorio continues to do well at this time.  She does not have any signs or symptoms that would suggest recurrence of her breast carcinoma.  She had her mammogram in 2017 which showed no concerns.  She understands that she will see Dr. Patel every 3-4 months for the first 2-3 years following her breast cancer diagnosis.  She already has a followup appointment scheduled with Dr. Patel in May.    2.  Coping.  Overall, she states that she is coping well with her breast cancer diagnosis.  She says she has a good support system at home.    3.  Energy.  She has noticed a little less energy more recently but states that she had been back to baseline following the treatment for her breast cancer.  We will continue to follow at subsequent visits.  TSH is on order for May.    4.  Peripheral neuropathy.  She had this as a side effect from the Taxol chemotherapy.  It continues to be stable and involves numbness in her fingertips and from the balls of her feet to her toes.  She has no pain with the peripheral neuropathy.  She understands that this may be a permanent side effect from the chemotherapy.  She may try acupuncture.   5.  Lymphedema.  She does plan to see a lymphedema specialist in April or  May.  She is not having any difficulty with lymphedema at this time.    6.  Cardiotoxicity.  She received 1 cycle of Adriamycin and left breast radiation.  She may be at risk for cardiovascular disease in the future.  I did ask her to establish with a primary care provider for aggressive management and treatment of her cholesterol, blood pressure and glucose.    7.  Bone health.  DEXA scan in 10/2016 was consistent with normal bone density.  Given the use of letrozole, we will obtain a bone density every 2 years.  I encouraged her to continue her weightbearing exercises 2-3 days a week.  Her calcium intake should be 1153-5883 mg along with vitamin D.     8.  Letrozole.  She does complain of some vaginal dryness.  She is not sexually active.  I suggested over-the-counter Replens, Luvena or Hyalo-Gyn to help with the vaginal dryness.  She has mild hot flashes, but they are tolerable.  She understands that her current joint aches and pains may be related to the letrozole, and she will continue to follow.  If they worsen, we could consider changing the aromatase inhibitor.   9.  Hypothyroidism.  Given that her radiation was near her thyroid region, Dr. Patel will be obtaining a TSH yearly.  We will plan to treat if she becomes hypothyroid.    10.  Risk of a blood cancer.  There is a small risk of MDS or leukemia given her exposure to the Adriamycin.  Dr. Patel plans to obtain a CBC and do this once a year.     11.  Cancer screening.  I encouraged her to continue with her regular cancer screening.  She does need a Pap and pelvic exam, as she states she has not had one since 2005.  She understands that vaginal bleeding would be abnormal and she would need to have this evaluated.  At the age of 61 she has not yet had a colonoscopy and I encouraged her to schedule one.  She will discuss this with her primary care provider.  She should limit her sun exposure and use sunscreens.   12.  Healthy lifestyle.  She should  refrain from smoking.  Her diet should include low fats.  Her BMI should be between 20 and 25.  She should exercise at least 150 minutes of cardiovascular exercise per week.  She does need to establish with a primary care provider for aggressive management and treatment of her cholesterol, blood pressure and glucose.  I did recommend the annual influenza vaccine.  When age appropriate, she should receive the pneumococcal vaccine.  She should continue to see her eye doctor every 1-2 years.  She should see her dentist at least yearly.       If there are no interval concerns, the patient will follow up with Dr. Patel in May.      Again, thank you for allowing me to participate in the care of your patient.      Sincerely,    Dary Schultz PA-C

## 2017-04-11 ENCOUNTER — HOSPITAL ENCOUNTER (OUTPATIENT)
Dept: PHYSICAL THERAPY | Facility: CLINIC | Age: 62
Setting detail: THERAPIES SERIES
End: 2017-04-11
Attending: INTERNAL MEDICINE
Payer: COMMERCIAL

## 2017-04-11 PROCEDURE — 40000449 ZZHC STATISTIC PT VISIT, LYMPHEDEMA: Performed by: PHYSICAL THERAPIST

## 2017-04-11 PROCEDURE — 97110 THERAPEUTIC EXERCISES: CPT | Mod: GP | Performed by: PHYSICAL THERAPIST

## 2017-04-11 PROCEDURE — 97535 SELF CARE MNGMENT TRAINING: CPT | Mod: GP | Performed by: PHYSICAL THERAPIST

## 2017-04-11 PROCEDURE — 97140 MANUAL THERAPY 1/> REGIONS: CPT | Mod: GP | Performed by: PHYSICAL THERAPIST

## 2017-04-25 ENCOUNTER — HOSPITAL ENCOUNTER (OUTPATIENT)
Dept: PHYSICAL THERAPY | Facility: CLINIC | Age: 62
Setting detail: THERAPIES SERIES
End: 2017-04-25
Attending: INTERNAL MEDICINE
Payer: COMMERCIAL

## 2017-04-25 PROCEDURE — 97110 THERAPEUTIC EXERCISES: CPT | Mod: GP | Performed by: PHYSICAL THERAPIST

## 2017-04-25 PROCEDURE — 40000449 ZZHC STATISTIC PT VISIT, LYMPHEDEMA: Performed by: PHYSICAL THERAPIST

## 2017-04-25 PROCEDURE — 97140 MANUAL THERAPY 1/> REGIONS: CPT | Mod: GP | Performed by: PHYSICAL THERAPIST

## 2017-05-08 ENCOUNTER — ONCOLOGY VISIT (OUTPATIENT)
Dept: ONCOLOGY | Facility: CLINIC | Age: 62
End: 2017-05-08
Attending: INTERNAL MEDICINE
Payer: COMMERCIAL

## 2017-05-08 VITALS
HEIGHT: 64 IN | DIASTOLIC BLOOD PRESSURE: 79 MMHG | RESPIRATION RATE: 12 BRPM | OXYGEN SATURATION: 97 % | SYSTOLIC BLOOD PRESSURE: 133 MMHG | TEMPERATURE: 98 F | HEART RATE: 79 BPM | BODY MASS INDEX: 35.71 KG/M2 | WEIGHT: 209.2 LBS

## 2017-05-08 DIAGNOSIS — R41.3 MEMORY LOSS: ICD-10-CM

## 2017-05-08 DIAGNOSIS — E55.9 VITAMIN D DEFICIENCY: ICD-10-CM

## 2017-05-08 DIAGNOSIS — T73.2XXD FATIGUE DUE TO EXPOSURE, SUBSEQUENT ENCOUNTER: ICD-10-CM

## 2017-05-08 DIAGNOSIS — R53.81 PHYSICAL DECONDITIONING: ICD-10-CM

## 2017-05-08 DIAGNOSIS — C50.512 MALIGNANT NEOPLASM OF LOWER-OUTER QUADRANT OF LEFT FEMALE BREAST (H): Primary | ICD-10-CM

## 2017-05-08 DIAGNOSIS — E06.3 HYPOTHYROIDISM DUE TO HASHIMOTO'S THYROIDITIS: ICD-10-CM

## 2017-05-08 DIAGNOSIS — C50.512 MALIGNANT NEOPLASM OF LOWER-OUTER QUADRANT OF LEFT FEMALE BREAST (H): ICD-10-CM

## 2017-05-08 LAB
ALBUMIN SERPL-MCNC: 3.7 G/DL (ref 3.4–5)
ALP SERPL-CCNC: 93 U/L (ref 40–150)
ALT SERPL W P-5'-P-CCNC: 23 U/L (ref 0–50)
ANION GAP SERPL CALCULATED.3IONS-SCNC: 9 MMOL/L (ref 3–14)
AST SERPL W P-5'-P-CCNC: 17 U/L (ref 0–45)
BASOPHILS # BLD AUTO: 0 10E9/L (ref 0–0.2)
BASOPHILS NFR BLD AUTO: 0.8 %
BILIRUB SERPL-MCNC: 0.5 MG/DL (ref 0.2–1.3)
BUN SERPL-MCNC: 16 MG/DL (ref 7–30)
CALCIUM SERPL-MCNC: 9 MG/DL (ref 8.5–10.1)
CHLORIDE SERPL-SCNC: 106 MMOL/L (ref 94–109)
CO2 SERPL-SCNC: 26 MMOL/L (ref 20–32)
CREAT SERPL-MCNC: 0.6 MG/DL (ref 0.52–1.04)
DIFFERENTIAL METHOD BLD: ABNORMAL
EOSINOPHIL # BLD AUTO: 0.1 10E9/L (ref 0–0.7)
EOSINOPHIL NFR BLD AUTO: 3.7 %
ERYTHROCYTE [DISTWIDTH] IN BLOOD BY AUTOMATED COUNT: 14.6 % (ref 10–15)
GFR SERPL CREATININE-BSD FRML MDRD: NORMAL ML/MIN/1.7M2
GLUCOSE SERPL-MCNC: 93 MG/DL (ref 70–99)
HCT VFR BLD AUTO: 37.9 % (ref 35–47)
HGB BLD-MCNC: 12.9 G/DL (ref 11.7–15.7)
IMM GRANULOCYTES # BLD: 0 10E9/L (ref 0–0.4)
IMM GRANULOCYTES NFR BLD: 0.3 %
LYMPHOCYTES # BLD AUTO: 0.7 10E9/L (ref 0.8–5.3)
LYMPHOCYTES NFR BLD AUTO: 19.4 %
MCH RBC QN AUTO: 31 PG (ref 26.5–33)
MCHC RBC AUTO-ENTMCNC: 34 G/DL (ref 31.5–36.5)
MCV RBC AUTO: 91 FL (ref 78–100)
MONOCYTES # BLD AUTO: 0.3 10E9/L (ref 0–1.3)
MONOCYTES NFR BLD AUTO: 9 %
NEUTROPHILS # BLD AUTO: 2.4 10E9/L (ref 1.6–8.3)
NEUTROPHILS NFR BLD AUTO: 66.8 %
NRBC # BLD AUTO: 0 10*3/UL
NRBC BLD AUTO-RTO: 0 /100
PLATELET # BLD AUTO: 219 10E9/L (ref 150–450)
POTASSIUM SERPL-SCNC: 4 MMOL/L (ref 3.4–5.3)
PROT SERPL-MCNC: 7.2 G/DL (ref 6.8–8.8)
RBC # BLD AUTO: 4.16 10E12/L (ref 3.8–5.2)
SODIUM SERPL-SCNC: 140 MMOL/L (ref 133–144)
T4 FREE SERPL-MCNC: 0.76 NG/DL (ref 0.76–1.46)
TSH SERPL DL<=0.005 MIU/L-ACNC: 6.22 MU/L (ref 0.4–4)
WBC # BLD AUTO: 3.6 10E9/L (ref 4–11)

## 2017-05-08 PROCEDURE — 84443 ASSAY THYROID STIM HORMONE: CPT | Performed by: INTERNAL MEDICINE

## 2017-05-08 PROCEDURE — 84439 ASSAY OF FREE THYROXINE: CPT | Performed by: INTERNAL MEDICINE

## 2017-05-08 PROCEDURE — 99212 OFFICE O/P EST SF 10 MIN: CPT | Mod: ZF

## 2017-05-08 PROCEDURE — 80053 COMPREHEN METABOLIC PANEL: CPT | Performed by: INTERNAL MEDICINE

## 2017-05-08 PROCEDURE — 99214 OFFICE O/P EST MOD 30 MIN: CPT | Mod: ZP | Performed by: INTERNAL MEDICINE

## 2017-05-08 PROCEDURE — 82306 VITAMIN D 25 HYDROXY: CPT | Performed by: INTERNAL MEDICINE

## 2017-05-08 PROCEDURE — 85025 COMPLETE CBC W/AUTO DIFF WBC: CPT | Performed by: INTERNAL MEDICINE

## 2017-05-08 NOTE — MR AVS SNAPSHOT
After Visit Summary   5/8/2017    Remedios Osorio    MRN: [id number]           Patient Information     Date Of Birth          1955        Visit Information        Provider Department      5/8/2017 3:15 PM Kanchan Patel MD [facility]        Today's Diagnoses     Malignant neoplasm of lower-outer quadrant of left female breast (H)    -  1    Hypothyroidism due to Hashimoto's thyroiditis        Vitamin D deficiency        Fatigue due to exposure, subsequent encounter        Memory loss        Physical deconditioning           Follow-ups after your visit        Additional Services     CANCER REHAB REFERRAL - OT       *This therapy referral will be filtered to a centralized scheduling office at [facility] and the patient will receive a call to schedule an appointment at a [place] location most convenient for them. *     [facility] provides Occupational Therapy evaluation and treatment and many specialty services across the [place] system.  If requesting a specialty program, please choose from the list below.    If you have not heard from the scheduling office within 2 business days, please call [phone] for all locations, with the exception of [place], please call [phone].     Treatment: Evaluation & Treatment  Special Instructions/Modalities: fatigue and memory loss following breast cancer treatment  Special Programs: Cancer Rehabilitation (PT and OT Evaluate & Treat)    Please be aware that coverage of these services is subject to the terms and limitations of your health insurance plan.  Call member services at your health plan with any benefit or coverage questions.      **Note to Provider:  If you are referring outside of [place] for the therapy appointment, please list the name of the location in the  special instructions  above, print the referral and give to the patient to schedule the appointment.             CANCER REHAB REFERRAL - PT       *This therapy referral will be filtered to a centralized scheduling office at [facility] and the patient will receive a call to schedule an appointment at a [place] location most convenient for them. *     [facility] provides Physical Therapy evaluation and treatment and many specialty services across the [place] system.  If requesting a specialty program, please choose from the list below.    If you have not heard from the scheduling office within 2 business days, please call [phone] for all locations, with the exception of [place], please call [phone].  Treatment: Evaluation & Treatment  Special Instructions/Modalities: deconditioning following cancer treatment  Special Programs: Cancer Rehabilitation (PT and OT Evaluate & Treat)    Please be aware that coverage of these services is subject to the terms and limitations of your health insurance plan.  Call member services at your health plan with any benefit or coverage questions.      **Note to Provider:  If you are referring outside of [place] for the therapy appointment, please list the name of the location in the  special instructions  above, print the referral and give to the patient to schedule the appointment.                  Your next 10 appointments already scheduled     May 17, 2017  3:30 PM CDT   Return Visit with Niesha Anne MD   Radiation Oncology Clinic ([facility])    [facility]  1st Floor  [address] [id number] [phone]              Future tests that were ordered for you today     Open Future Orders        Priority Expected Expires Ordered    CBC with platelets differential Routine 8/7/2017 5/10/2018 5/10/2017    Comprehensive metabolic panel Routine 8/7/2017 5/10/2018 5/10/2017    TSH with free T4 reflex Routine 8/7/2017 5/10/2018 5/10/2017            Who to contact     If you have questions or  "need follow up information about today's clinic visit or your schedule please contact [facility] directly at [phone].  Normal or non-critical lab and imaging results will be communicated to you by [employer], letter or phone within 4 business days after the clinic has received the results. If you do not hear from us within 7 days, please contact the clinic through [employer] or phone. If you have a critical or abnormal lab result, we will notify you by phone as soon as possible.  Submit refill requests through [employer] or call your pharmacy and they will forward the refill request to us. Please allow 3 business days for your refill to be completed.          Additional Information About Your Visit        [employer] Information     [employer] gives you secure access to your electronic health record. If you see a primary care provider, you can also send messages to your care team and make appointments. If you have questions, please call your primary care clinic.  If you do not have a primary care provider, please call [phone] and they will assist you.        Care EveryWhere ID     This is your Care EveryWhere ID. This could be used by other organizations to access your [facility] medical records  [id number]        Your Vitals Were     Pulse Temperature Respirations Height Last Period Pulse Oximetry    79 98  F (36.7  C) (Oral) 12 1.627 m (5' 4.06\") 10/28/2009 97%    BMI (Body Mass Index)                   35.85 kg/m2            Blood Pressure from Last 3 Encounters:   05/08/17 133/79   03/30/17 119/84   02/06/17 145/78    Weight from Last 3 Encounters:   05/08/17 94.9 kg (209 lb 3.2 oz)   03/30/17 95.8 kg (211 lb 1.6 oz)   02/06/17 91.7 kg (202 lb 1.6 oz)                 Today's Medication Changes          These changes are accurate as of: 5/8/17 11:59 PM.  If you have any questions, ask your nurse or doctor.               Start taking these medicines.        Dose/Directions    levothyroxine 50 MCG " tablet   Commonly known as:  SYNTHROID/LEVOTHROID   Used for:  Hypothyroidism due to Hashimoto's thyroiditis        Dose:  50 mcg   Take 1 tablet (50 mcg) by mouth daily   Quantity:  90 tablet   Refills:  3            Where to get your medicines      These medications were [place] [facility] [place] - [address]  [address] [facility] [address]     Phone:  [phone]     levothyroxine 50 MCG tablet                Primary Care Provider    None       No address on file        Thank you!     Thank you for choosing [facility]  for your care. Our goal is always to provide you with excellent care. Hearing back from our patients is one way we can continue to improve our services. Please take a few minutes to complete the written survey that you may receive in the mail after your visit with us. Thank you!             Your Updated Medication List - Protect others around you: Learn how to safely use, store and throw away your medicines at www.disposemymeds.org.          This list is accurate as of: 5/8/17 11:59 PM.  Always use your most recent med list.                   Brand Name Dispense Instructions for use    letrozole 2.5 MG tablet    FEMARA    30 tablet    Take 1 tablet (2.5 mg) by mouth daily       levothyroxine 50 MCG tablet    SYNTHROID/LEVOTHROID    90 tablet    Take 1 tablet (50 mcg) by mouth daily       order for DME     1 each    Equipment being ordered: Sit Stand Desk

## 2017-05-08 NOTE — LETTER
5/8/2017       RE: Remedios Osorio  [address] [facility] [id number]     Dear Colleague,    Thank you for referring your patient, Remedios Osorio, to the [facility]. Please see a copy of my visit note below.    MEDICAL ONCOLOGY FOLLOW-UP PATIENT VISIT     NAME: Remedios Osorio     DATE: 5/8/2017    PRIMARY CARE PHYSICIAN: Kae Caal    PATIENT ID: Clinical Stage II-B Breast Cancer    Oncology History: Remedios Osorio is a 61 year old female with history of obesity, prediabetes and iron deficiency anemia with stage IIIa, [id number], ER positive, KS positive, HER2 non-amplified invasive ductal carcinoma of the left breast. Her PCP palpated a breast mass in the lower outer left breast in 01/2016. Diagnostic mammogram and ultrasound showed a hyperdense mass at 4:30, 8 cm from the nipple, measuring 3.3 cm on mammogram and 2.1 cm on ultrasound. She was also found to have multiple abnormal left axillary lymph nodes. The largest one was 2.4 cm. Core needle biopsy of her left breast mass demonstrated invasive ductal carcinoma, grade 3 with extensive tumor necrosis. Axillary LN biopsy was also positive for malignancy, consistent with breast primary. The tumor cells are strongly positive for estrogen receptor (> 95%) and are moderate to strongly positive for progesterone receptor (60-70%). HER2/lizz was non-amplified by FISH.  PET/CT showed the left breast mass, 5 hypermetabolic left axillary lymph nodes, indeterminate left cervical chain lymph nodes, and an indeterminate hypodensity in the dome of the liver, but was negative for distant metastasis. She declined enrollment in the I-SPY2 clinical trial and agreed to proceed with neoadjuvant chemotherapy, she received 11 cycles of weekly Taxol, the 12th was cancelled due to neuropathy. She received one cycle of ddAC but did not tolerate this well due to fatigue and generalized weakness. She declined any further chemotherapy.    Left breast  lumpectomy and left axillary lymph node dissection was performed by Dr. Amezcua on 6/30/16.  Pathology showed a residual 1.6 cm grade 2, IDC in the left breast.  Invasive surgical margin was negative by 3 mm.  There was moderate intratumoral fibrosis, with final tumor cellularity of 30%.  Tumor infiltrating lymphocytes were estimated at 50%.  6/19 excised lymph nodes were involved with malignancy.  The largest lymph node metastasis measured 3.9 cm and had a 1 mm area of extranodal extension.  Minimal treatment related changes were noted in the lymph nodes.  Pathologic staging was P6sP7T2, or stage IIIa.  [facility] residual cancer burden was Class III.  I met with Remedios in clinic on 7/11/16.  Unfortunately she was not eligible for ARIADNA due to having received only 13 weeks of neoadjuvant chemotherapy.  We discussed the results of the CREATE-X study (adjuvant Xeloda in patient w/o CR after nate-adjuvant tx), however, after extensive discussion she ultimately declined additional adjuvant chemotherapy.  She completed radiation on 10/17/16.  She has been on letrozole since early 09/2016.    Interim History:   Remedios comes into clinic today for 3 month breast cancer follow up.  She has been on letrozole for approximately 8 months.  Her biggest complaint at this time is fatigue.  She has returned to working full time, however, is feeling extremely fatigued at the end of the day.  She states that she is unable to accomplish tasks she could do easily prior to her breast cancer treatment.  For example, this past weekend, she was only able to mow the lawn of a single side of the house.  She states thinking is also not entirely clear.  She has some difficulty with short term memory, for example finding words or remembering tasks.  She reports diffuse joint stiffness.  She has intermittent hot flashes.  They occur both during the day and at night.  She states they are not bothersome enough for her to take medication for them.  She  denies vaginal complaints.  She denies concerning lumps or masses of either breast.  She continues to have numbness on the underside of the left arm and in the left breast.  She reports tightness of the LUE and limited ROM of the LUE.  She has been seen by lymphedema clinic, however, admits she hasn't been doing her prescribed compression, massage, or exercise therapies.  She reports frustration at having regained almost all of the weight she lost.  She states she hasn't been very good about portion control.  She has not been routinely exercising.  The remainder of a 10 point review of systems is otherwise negative.    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       PAST SURGICAL HISTORY:  Past Surgical History:   Procedure Laterality Date     CL AFF SURGICAL PATHOLOGY  2005    UTERINE LEIOMYOMA  [D25.9]     HC BIOPSY/EXCIS CERVICAL LESION W/WO FULGURATION  08-15-00,12-    endocervical polypectomy     HC TOOTH EXTRACTION W/FORCEP      wisdom teeth     LUMPECTOMY BREAST WITH SENTINEL NODE, COMBINED Left 6/29/2016    Segmental mastectomy with axillary lymph node dissection     REMOVE PORT VASCULAR ACCESS Right 6/29/2016    Procedure: REMOVE PORT VASCULAR ACCESS;  Surgeon: Gianna Amezcua MD;  Location:  OR     MEDS:  Current Outpatient Prescriptions   Medication     order for DME     letrozole (FEMARA) 2.5 MG tablet     No current facility-administered medications for this visit.      Facility-Administered Medications Ordered in Other Visits   Medication     lidocaine BUFFERED 1 % solution 10 mL       ALLERGIES:  Allergies  "  Allergen Reactions     Benadryl [Diphenhydramine] Other (See Comments)     Pt had severe hypotension, nausea and vasovagal type reaction to IV Benadryl.   Give PO only.      Cats      Keflex [Cephalexin]      rash        PHYSICAL EXAM:  /79  Pulse 79  Temp 98  F (36.7  C) (Oral)  Resp 12  Ht 1.627 m (5' 4.06\")  Wt 94.9 kg (209 lb 3.2 oz)  LMP 10/28/2009  SpO2 97%  BMI 35.85 kg/m2  LMP 10/28/2009  General:  Well appearing, well-nourished adult female in NAD.  HEENT:  Normocephalic.  Sclera anicteric.  MMM.  No lesions of the oropharynx.  Lymph:  No palpable cervical, supraclavicular, or axillary LAD.  Chest:  CTA bilaterally.  No wheezes or crackles.  CV:  RRR.  Nl S1 and S2.  No m/r/g.  Breast:  Bilateral breasts are of normal fibroglandular density.  There is enhancement of pores and skin thickening of the left breast in the distribution of the radiation field.  There are no discretely palpable masses in either breast.  Bilateral nipples are everted.  No nipple discharge.  Abd:  Soft/NT/ND.  BSs normoactive.  No hepatosplenomegaly.  Ext:  No pitting edema of the bilateral lower extremities.  Pulses 2+ and symmetric.  Musculo:  Strength 5/5 throughout.  No notable lymphedema of the LUE.  Restricted ROM of LUE.  Neuro:  Cranial nerves grossly intact.  Psych:  Mood and affect appear normal.    LABS REVIEWED THIS VISIT:  2/6/17 Bilateral diagnostic mammogram:  FINDINGS:  No suspicious findings in either breast. There is a benign  appearing postsurgical scar in the left central breast with no  suspicious findings.     There is skin thickening in the left breast consistent with  postsurgical or postradiation changes.                                                                       IMPRESSION: BI-RADS CATEGORY: 2 - Benign Finding(s)    Results for REGINALD BURTON FANNIE (MRN [id number]) as of 5/11/2017 15:01   Ref. Range 5/8/2017 15:12   Sodium Latest Ref Range: 133 - 144 mmol/L 140   Potassium Latest Ref " Range: 3.4 - 5.3 mmol/L 4.0   Chloride Latest Ref Range: 94 - 109 mmol/L 106   Carbon Dioxide Latest Ref Range: 20 - 32 mmol/L 26   Urea Nitrogen Latest Ref Range: 7 - 30 mg/dL 16   Creatinine Latest Ref Range: 0.52 - 1.04 mg/dL 0.60   GFR Estimate Latest Ref Range: >60 mL/min/1.7m2 >90...   GFR Estimate If Black Latest Ref Range: >60 mL/min/1.7m2 >90...   Calcium Latest Ref Range: 8.5 - 10.1 mg/dL 9.0   Anion Gap Latest Ref Range: 3 - 14 mmol/L 9   Albumin Latest Ref Range: 3.4 - 5.0 g/dL 3.7   Protein Total Latest Ref Range: 6.8 - 8.8 g/dL 7.2   Bilirubin Total Latest Ref Range: 0.2 - 1.3 mg/dL 0.5   Alkaline Phosphatase Latest Ref Range: 40 - 150 U/L 93   ALT Latest Ref Range: 0 - 50 U/L 23   AST Latest Ref Range: 0 - 45 U/L 17   Results for REMEDIOS OSORIO (MRN [id number]) as of 5/11/2017 15:01   Ref. Range 5/8/2017 15:12   T4 Free Latest Ref Range: 0.76 - 1.46 ng/dL 0.76   TSH Latest Ref Range: 0.40 - 4.00 mU/L 6.22 (H)   Results for REMEDIOS OSORIO (MRN [id number]) as of 5/11/2017 15:01   Ref. Range 5/8/2017 15:12   WBC Latest Ref Range: 4.0 - 11.0 10e9/L 3.6 (L)   Hemoglobin Latest Ref Range: 11.7 - 15.7 g/dL 12.9   Hematocrit Latest Ref Range: 35.0 - 47.0 % 37.9   Platelet Count Latest Ref Range: 150 - 450 10e9/L 219       IMPRESSION/PLAN: Remedios Osorio is a 61 year old female with history of obesity, prediabetes and iron deficiency anemia with a stage IIIa, [id number], ER/HI positive, HER2 negative left sided breast cancer. She is s/p 11 weeks of neoadjuvant weekly taxol and one cycle of ddAC. She declined further chemotherapy due to side effects.  Left breast lumpectomy and ALND on 6/30/16 showed a residual 1.6 cm IDC in the left breast and 6/19 lymph nodes involved with malignancy.  She declined adjuvant Xeloda.  She received adjuvant radiation and has been on letrozole since early September, 2016.      #  Stage III breast cancer:  Remedios is approximately 10 months out from excision of her left sided breast  cancer.  She has been adjuvant letrozole for 8 months.  She reports symptoms of fatigue, arthralgias, and hot flashes.    We discussed option to change therapy to exemestane.  We discussed that exemestane offers similar benefit in reduction of breast cancer recurrence, however, is formulated differently than letrozole and some patients will have less side effects on exemestane.  She declined changing therapy at this time and despite the side effects is willing to continue letrozole at this time.  We plan to continue letrozole to complete a total of 10 years of endocrine therapy (through 09/2026).  There is no evidence of disease recurrence on clinical breast exam performed today.  She will return to clinic in 3 months for her next exam.  We will continue annual screening mammogram in February of each year.    We discussed the importance of diet in exercise in the prevention of breast cancer recurrence.  I encouraged her to resume an active role in prevention.  I asked that she eat a diet low in saturated fat and walk a half hour a day, 5 days a week.    # Fatigue/deconditioning/cognitive deficits:  Secondary to breast cancer therapy.  I recommended cancer rehab, both PT and OT.  She was agreeable, referral placed today.    #  Subclinical hypothyroidism:  Elevated TSH and free T4 at the low end of normal.  She reports taking exogenous iodine, which may cause hypothyroidism.  I asked her to stop iodine supplements.  Will recheck thyroid function studies in 3 months.    #  Neuropathy:  Little improvement in the last few months.  Asked to stop vitamin B6 at this time, as taking for prolonged periods of time can cause neuropathy.  Continue deep massage to the fingers and toes daily.    #  Lymphedema:  She has been seen by lymphedema clinic.  She has lapsed in her treatment; I encouraged her to resume prescribed therapies.    #  Bone Health:  DEXA bone density scan performed in 10/2016 showed a lowest T-score of -0.5.   She is at increased risk of bone loss on aromatase inhibitor therapy.  She will continue calcium 1200 mg and vitamin D 1000 IU PO daily.      #  Follow Up:  Return to clinic in 3 months for labs and visit with me.    It was a pleasure to see Remedios in clinic today.  A total of 25 minutes of our 30 minute face to face visit was spent in counseling.        Again, thank you for allowing me to participate in the care of your patient.      Sincerely,    Kanchan Patel MD

## 2017-05-08 NOTE — PROGRESS NOTES
MEDICAL ONCOLOGY FOLLOW-UP PATIENT VISIT     NAME: Remedios Osorio     DATE: 5/8/2017    PRIMARY CARE PHYSICIAN: Kae Caal    PATIENT ID: Clinical Stage II-B Breast Cancer    Oncology History: Remedios Osorio is a 61 year old female with history of obesity, prediabetes and iron deficiency anemia with stage IIIa, [id number], ER positive, OR positive, HER2 non-amplified invasive ductal carcinoma of the left breast. Her PCP palpated a breast mass in the lower outer left breast in 01/2016. Diagnostic mammogram and ultrasound showed a hyperdense mass at 4:30, 8 cm from the nipple, measuring 3.3 cm on mammogram and 2.1 cm on ultrasound. She was also found to have multiple abnormal left axillary lymph nodes. The largest one was 2.4 cm. Core needle biopsy of her left breast mass demonstrated invasive ductal carcinoma, grade 3 with extensive tumor necrosis. Axillary LN biopsy was also positive for malignancy, consistent with breast primary. The tumor cells are strongly positive for estrogen receptor (> 95%) and are moderate to strongly positive for progesterone receptor (60-70%). HER2/lizz was non-amplified by FISH.  PET/CT showed the left breast mass, 5 hypermetabolic left axillary lymph nodes, indeterminate left cervical chain lymph nodes, and an indeterminate hypodensity in the dome of the liver, but was negative for distant metastasis. She declined enrollment in the I-SPY2 clinical trial and agreed to proceed with neoadjuvant chemotherapy, she received 11 cycles of weekly Taxol, the 12th was cancelled due to neuropathy. She received one cycle of ddAC but did not tolerate this well due to fatigue and generalized weakness. She declined any further chemotherapy.    Left breast lumpectomy and left axillary lymph node dissection was performed by Dr. Amezcua on 6/30/16.  Pathology showed a residual 1.6 cm grade 2, IDC in the left breast.  Invasive surgical margin was negative by 3 mm.  There was moderate intratumoral  fibrosis, with final tumor cellularity of 30%.  Tumor infiltrating lymphocytes were estimated at 50%.  6/19 excised lymph nodes were involved with malignancy.  The largest lymph node metastasis measured 3.9 cm and had a 1 mm area of extranodal extension.  Minimal treatment related changes were noted in the lymph nodes.  Pathologic staging was [id number], or stage IIIa.  [facility] residual cancer burden was Class III.  I met with Remedios in clinic on 7/11/16.  Unfortunately she was not eligible for ARIADNA due to having received only 13 weeks of neoadjuvant chemotherapy.  We discussed the results of the CREATE-X study (adjuvant Xeloda in patient w/o CR after nate-adjuvant tx), however, after extensive discussion she ultimately declined additional adjuvant chemotherapy.  She completed radiation on 10/17/16.  She has been on letrozole since early 09/2016.    Interim History:   Remedios comes into clinic today for 3 month breast cancer follow up.  She has been on letrozole for approximately 8 months.  Her biggest complaint at this time is fatigue.  She has returned to working full time, however, is feeling extremely fatigued at the end of the day.  She states that she is unable to accomplish tasks she could do easily prior to her breast cancer treatment.  For example, this past weekend, she was only able to mow the lawn of a single side of the house.  She states thinking is also not entirely clear.  She has some difficulty with short term memory, for example finding words or remembering tasks.  She reports diffuse joint stiffness.  She has intermittent hot flashes.  They occur both during the day and at night.  She states they are not bothersome enough for her to take medication for them.  She denies vaginal complaints.  She denies concerning lumps or masses of either breast.  She continues to have numbness on the underside of the left arm and in the left breast.  She reports tightness of the LUE and limited ROM of the LUE.   She has been seen by lymphedema clinic, however, admits she hasn't been doing her prescribed compression, massage, or exercise therapies.  She reports frustration at having regained almost all of the weight she lost.  She states she hasn't been very good about portion control.  She has not been routinely exercising.  The remainder of a 10 point review of systems is otherwise negative.    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       PAST SURGICAL HISTORY:  Past Surgical History:   Procedure Laterality Date     CL AFF SURGICAL PATHOLOGY  2005    UTERINE LEIOMYOMA  [D25.9]     HC BIOPSY/EXCIS CERVICAL LESION W/WO FULGURATION  08-15-00,12-    endocervical polypectomy     HC TOOTH EXTRACTION W/FORCEP      wisdom teeth     LUMPECTOMY BREAST WITH SENTINEL NODE, COMBINED Left 6/29/2016    Segmental mastectomy with axillary lymph node dissection     REMOVE PORT VASCULAR ACCESS Right 6/29/2016    Procedure: REMOVE PORT VASCULAR ACCESS;  Surgeon: Gianna Amezcua MD;  Location:  OR     MEDS:  Current Outpatient Prescriptions   Medication     order for DME     letrozole (FEMARA) 2.5 MG tablet     No current facility-administered medications for this visit.      Facility-Administered Medications Ordered in Other Visits   Medication     lidocaine BUFFERED 1 % solution 10 mL       ALLERGIES:  Allergies   Allergen Reactions     Benadryl [Diphenhydramine] Other (See Comments)     Pt had severe hypotension, nausea and vasovagal type reaction to IV Benadryl.   Give PO only.      Cats      Keflex [Cephalexin]      rash        PHYSICAL  "EXAM:  /79  Pulse 79  Temp 98  F (36.7  C) (Oral)  Resp 12  Ht 1.627 m (5' 4.06\")  Wt 94.9 kg (209 lb 3.2 oz)  LMP 10/28/2009  SpO2 97%  BMI 35.85 kg/m2  LMP 10/28/2009  General:  Well appearing, well-nourished adult female in NAD.  HEENT:  Normocephalic.  Sclera anicteric.  MMM.  No lesions of the oropharynx.  Lymph:  No palpable cervical, supraclavicular, or axillary LAD.  Chest:  CTA bilaterally.  No wheezes or crackles.  CV:  RRR.  Nl S1 and S2.  No m/r/g.  Breast:  Bilateral breasts are of normal fibroglandular density.  There is enhancement of pores and skin thickening of the left breast in the distribution of the radiation field.  There are no discretely palpable masses in either breast.  Bilateral nipples are everted.  No nipple discharge.  Abd:  Soft/NT/ND.  BSs normoactive.  No hepatosplenomegaly.  Ext:  No pitting edema of the bilateral lower extremities.  Pulses 2+ and symmetric.  Musculo:  Strength 5/5 throughout.  No notable lymphedema of the LUE.  Restricted ROM of LUE.  Neuro:  Cranial nerves grossly intact.  Psych:  Mood and affect appear normal.    LABS REVIEWED THIS VISIT:  2/6/17 Bilateral diagnostic mammogram:  FINDINGS:  No suspicious findings in either breast. There is a benign  appearing postsurgical scar in the left central breast with no  suspicious findings.     There is skin thickening in the left breast consistent with  postsurgical or postradiation changes.                                                                       IMPRESSION: BI-RADS CATEGORY: 2 - Benign Finding(s)    Results for BURTON MONTELONGO (MRN [id number]) as of 5/11/2017 15:01   Ref. Range 5/8/2017 15:12   Sodium Latest Ref Range: 133 - 144 mmol/L 140   Potassium Latest Ref Range: 3.4 - 5.3 mmol/L 4.0   Chloride Latest Ref Range: 94 - 109 mmol/L 106   Carbon Dioxide Latest Ref Range: 20 - 32 mmol/L 26   Urea Nitrogen Latest Ref Range: 7 - 30 mg/dL 16   Creatinine Latest Ref Range: 0.52 - 1.04 mg/dL 0.60 "   GFR Estimate Latest Ref Range: >60 mL/min/1.7m2 >90...   GFR Estimate If Black Latest Ref Range: >60 mL/min/1.7m2 >90...   Calcium Latest Ref Range: 8.5 - 10.1 mg/dL 9.0   Anion Gap Latest Ref Range: 3 - 14 mmol/L 9   Albumin Latest Ref Range: 3.4 - 5.0 g/dL 3.7   Protein Total Latest Ref Range: 6.8 - 8.8 g/dL 7.2   Bilirubin Total Latest Ref Range: 0.2 - 1.3 mg/dL 0.5   Alkaline Phosphatase Latest Ref Range: 40 - 150 U/L 93   ALT Latest Ref Range: 0 - 50 U/L 23   AST Latest Ref Range: 0 - 45 U/L 17   Results for REMEDIOS OSORIO (MRN [id number]) as of 5/11/2017 15:01   Ref. Range 5/8/2017 15:12   T4 Free Latest Ref Range: 0.76 - 1.46 ng/dL 0.76   TSH Latest Ref Range: 0.40 - 4.00 mU/L 6.22 (H)   Results for REMEDIOS OSORIO (MRN [id number]) as of 5/11/2017 15:01   Ref. Range 5/8/2017 15:12   WBC Latest Ref Range: 4.0 - 11.0 10e9/L 3.6 (L)   Hemoglobin Latest Ref Range: 11.7 - 15.7 g/dL 12.9   Hematocrit Latest Ref Range: 35.0 - 47.0 % 37.9   Platelet Count Latest Ref Range: 150 - 450 10e9/L 219       IMPRESSION/PLAN: Remedios Osorio is a 61 year old female with history of obesity, prediabetes and iron deficiency anemia with a stage IIIa, [id number], ER/UT positive, HER2 negative left sided breast cancer. She is s/p 11 weeks of neoadjuvant weekly taxol and one cycle of ddAC. She declined further chemotherapy due to side effects.  Left breast lumpectomy and ALND on 6/30/16 showed a residual 1.6 cm IDC in the left breast and 6/19 lymph nodes involved with malignancy.  She declined adjuvant Xeloda.  She received adjuvant radiation and has been on letrozole since early September, 2016.      #  Stage III breast cancer:  Remedios is approximately 10 months out from excision of her left sided breast cancer.  She has been adjuvant letrozole for 8 months.  She reports symptoms of fatigue, arthralgias, and hot flashes.    We discussed option to change therapy to exemestane.  We discussed that exemestane offers similar benefit in  reduction of breast cancer recurrence, however, is formulated differently than letrozole and some patients will have less side effects on exemestane.  She declined changing therapy at this time and despite the side effects is willing to continue letrozole at this time.  We plan to continue letrozole to complete a total of 10 years of endocrine therapy (through 09/2026).  There is no evidence of disease recurrence on clinical breast exam performed today.  She will return to clinic in 3 months for her next exam.  We will continue annual screening mammogram in February of each year.    We discussed the importance of diet in exercise in the prevention of breast cancer recurrence.  I encouraged her to resume an active role in prevention.  I asked that she eat a diet low in saturated fat and walk a half hour a day, 5 days a week.    # Fatigue/deconditioning/cognitive deficits:  Secondary to breast cancer therapy.  I recommended cancer rehab, both PT and OT.  She was agreeable, referral placed today.    #  Subclinical hypothyroidism:  Elevated TSH and free T4 at the low end of normal.  She reports taking exogenous iodine, which may cause hypothyroidism.  I asked her to stop iodine supplements.  Will recheck thyroid function studies in 3 months.    #  Neuropathy:  Little improvement in the last few months.  Asked to stop vitamin B6 at this time, as taking for prolonged periods of time can cause neuropathy.  Continue deep massage to the fingers and toes daily.    #  Lymphedema:  She has been seen by lymphedema clinic.  She has lapsed in her treatment; I encouraged her to resume prescribed therapies.    #  Bone Health:  DEXA bone density scan performed in 10/2016 showed a lowest T-score of -0.5.  She is at increased risk of bone loss on aromatase inhibitor therapy.  She will continue calcium 1200 mg and vitamin D 1000 IU PO daily.      #  Follow Up:  Return to clinic in 3 months for labs and visit with me.    It was a  pleasure to see Remedios in clinic today.  A total of 25 minutes of our 30 minute face to face visit was spent in counseling.

## 2017-05-09 ENCOUNTER — TELEPHONE (OUTPATIENT)
Dept: ONCOLOGY | Facility: CLINIC | Age: 62
End: 2017-05-09

## 2017-05-09 RX ORDER — LEVOTHYROXINE SODIUM 50 UG/1
50 TABLET ORAL DAILY
Qty: 90 TABLET | Refills: 3 | Status: SHIPPED | OUTPATIENT
Start: 2017-05-09 | End: 2017-11-15

## 2017-05-09 NOTE — PROGRESS NOTES
Left patient a message with results of yesterday's thyroid function studies.  TSH was elevated.  Free T4 was at the lower limit of normal.  This is consistent with hypothyroidism.  As she has had radiation above the clavicle for her breast cancer, she is at risk for hypothyroidism.  Given patient reported fatigue at our visit yesterday I recommend starting synthroid 50 mcg PO daily.  I sent the prescription to the [facility] in [place].    Kanchan Patel MD

## 2017-05-10 DIAGNOSIS — C50.512 MALIGNANT NEOPLASM OF LOWER-OUTER QUADRANT OF LEFT FEMALE BREAST (H): Primary | ICD-10-CM

## 2017-05-10 DIAGNOSIS — E03.2 HYPOTHYROIDISM DUE TO NON-MEDICATION EXOGENOUS SUBSTANCES: ICD-10-CM

## 2017-05-11 LAB — DEPRECATED CALCIDIOL+CALCIFEROL SERPL-MC: 39 UG/L (ref 20–75)

## 2017-05-17 ENCOUNTER — OFFICE VISIT (OUTPATIENT)
Dept: RADIATION ONCOLOGY | Facility: CLINIC | Age: 62
End: 2017-05-17
Attending: RADIOLOGY
Payer: COMMERCIAL

## 2017-05-17 VITALS
HEART RATE: 84 BPM | BODY MASS INDEX: 36.22 KG/M2 | WEIGHT: 211.4 LBS | SYSTOLIC BLOOD PRESSURE: 121 MMHG | DIASTOLIC BLOOD PRESSURE: 74 MMHG

## 2017-05-17 DIAGNOSIS — C50.512 MALIGNANT NEOPLASM OF LOWER-OUTER QUADRANT OF LEFT FEMALE BREAST (H): Primary | ICD-10-CM

## 2017-05-17 PROCEDURE — 99214 OFFICE O/P EST MOD 30 MIN: CPT | Performed by: RADIOLOGY

## 2017-05-17 NOTE — MR AVS SNAPSHOT
After Visit Summary   5/17/2017    Remedios Osorio    MRN: [id number]           Patient Information     Date Of Birth          1955        Visit Information        Provider Department      5/17/2017 3:30 PM Niesha Anne MD [facility]        Today's Diagnoses     Malignant neoplasm of lower-outer quadrant of left female breast (H)    -  1       Follow-ups after your visit        Your next 10 appointments already scheduled     May 25, 2017  8:00 AM CDT   Cancer Rehab Treatment with Carmen Fish, PT   [facility]    [address], [address]  [place] [address]   [phone]            May 30, 2017  8:00 AM CDT   Cancer Rehab Eval with Darío Macedo OT   [facility])    [address]  [place] [address]   [phone]            Jun 07, 2017  8:00 AM CDT   Cancer Rehab Treatment with Carmen Fish, PT   [facility]    [address]  [place] [address]   [phone]            Jun 08, 2017  8:00 AM CDT   Cancer Rehab Treatment with Carmen Fish, PT   [facility]    [address]  [place] [address]   [phone]            Mukul 15, 2017  8:00 AM CDT   Cancer Rehab Treatment with Carmen Fish, PT   [facility]    [address]  [place] [address]   [phone]            Jun 16, 2017  8:00 AM CDT   Cancer Rehab Treatment with Carmen Fish, PT   [facility]  [place]    [address]  [place] [address]   [phone]            Jun 20, 2017  8:00 AM CDT   [facility] Rehab Treatment with Carmen Fish, PT   [facility]    [address]  [place] [address]   [phone]            Jun 22, 2017  8:00 AM CDT   Cancer Rehab Treatment with Carmen Fish, PT   [facility]    [address], [address]  [place] [address]   [phone]            Jun 27, 2017  8:00 AM CDT   Cancer Rehab Treatment with Carmen Fish, PT   [facility]    [address], [address]  [place] [address]   [phone]            Jun 28, 2017  8:00 AM CDT   Cancer Rehab Treatment with Carmen Fish, PT   [facility]    [address], [address]  [place] [address]   [phone]              Who to contact     Please call your clinic at [phone] to:    Ask questions about your health    Make or cancel appointments    Discuss your medicines    Learn about your test results    Speak to your doctor   If you have compliments or concerns about an experience at your clinic, or if you wish to file a complaint, please contact [facility] Patient Relations at [phone] or email us at [email or web address]         Additional Information About Your Visit        MyChart Information     [employer] gives you secure access to your electronic health record. If you see a primary care provider, you can also send messages to your care team and make appointments. If you have questions, please call your primary care clinic.  If you do not have a primary care provider,  please call [phone] and they will assist you.      [employer] is an electronic gateway that provides easy, online access to your medical records. With [employer], you can request a clinic appointment, read your test results, renew a prescription or communicate with your care team.     To access your existing account, please contact your [facility] or call [phone] for assistance.        Care EveryWhere ID     This is your Care EveryWhere ID. This could be used by other organizations to access your [facility] medical records  [id number]        Your Vitals Were     Pulse Last Period BMI (Body Mass Index)             84 10/28/2009 36.22 kg/m2          Blood Pressure from Last 3 Encounters:   05/17/17 121/74   05/08/17 133/79   03/30/17 119/84    Weight from Last 3 Encounters:   05/17/17 95.9 kg (211 lb 6.4 oz)   05/08/17 94.9 kg (209 lb 3.2 oz)   03/30/17 95.8 kg (211 lb 1.6 oz)              Today, you had the following     No orders found for display       Primary Care Provider    None       No address on file        Thank you!     Thank you for choosing [facility]  for your care. Our goal is always to provide you with excellent care. Hearing back from our patients is one way we can continue to improve our services. Please take a few minutes to complete the written survey that you may receive in the mail after your visit with us. Thank you!             Your Updated Medication List - Protect others around you: Learn how to safely use, store and throw away your medicines at www.disposemymeds.org.          This list is accurate as of: 5/17/17 11:59 PM.  Always use your most recent med list.                   Brand Name Dispense Instructions for use    letrozole 2.5 MG tablet    FEMARA    30 tablet    Take 1 tablet (2.5 mg) by mouth daily       levothyroxine 50 MCG tablet    SYNTHROID/LEVOTHROID    90 tablet    Take 1 tablet (50 mcg) by mouth daily       order for DME     1  each    Equipment being ordered: Sit Stand Desk

## 2017-05-17 NOTE — PROGRESS NOTES
FOLLOW-UP VISIT    Patient Name: Remedios Osorio      : 1955     Age: 61 year old        ______________________________________________________________________________     Chief Complaint   Patient presents with     Cancer     7 month fup for rad therapy  to L breast     /74  Pulse 84  Wt 95.9 kg (211 lb 6.4 oz)  LMP 10/28/2009  BMI 36.22 kg/m2     Date Radiation Completed: 10/17/16    Pain  Denies    Labs  Other Labs: No    Imaging  Mammogram: 17          Other Appointments:     MD Name: DR. Patel Appointment Date:    MD Name: Appointment Date:   MD Name: Appointment Date:   Other Appointment Notes:     Residual Radiation side effect: none     Additional Instructions:

## 2017-05-17 NOTE — LETTER
5/17/2017     RE: Remedios Osorio  [address] [facility] [place] [address]     Dear Colleague,    Thank you for referring your patient, Remedios Osorio, to the RADIATION ONCOLOGY CLINIC. Please see a copy of my visit note below.    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  Status post chemotherapy, lumpectomy and axillary lymph node dissection.      RADIATION THERAPY DELIVERED:  Adjuvant radiation therapy to the left breast, supraclav and axilla to a total dose of 6040 cGy in 33 fractions completed on 10/17/2016.      INTERVAL SINCE COMPLETION OF RADIATION THERAPY:  7 months.      SUBJECTIVE: Ms. Osorio is a 61-year-old woman who was initially diagnosed with breast cancer after presenting with a mass in her left breast in 01/2016.  This was evaluated with mammogram which showed a 3.3 cm mass in the lower outer quadrant of her left breast with abnormal lymph nodes on ultrasound.  Biopsy of the breast and lymph node were both positive for malignancy.  She underwent a PET scan which showed the breast mass and several positive axillary lymph nodes.  She then received 11 cycles of weekly Taxol and 1 cycle of dose-dense AC.  She did not complete all of her chemotherapy due to intolerance.  She then had a lumpectomy and axillary lymph node dissection on 06/29/2016, positive for invasive ductal carcinoma, grade 2, with 1.6 x 1.4 cm residual tumor , ER/DC positive, HER-2 nonamplified with no lymphovascular space invasion, 3 mm margins and 6 of 19 positive lymph nodes, the largest of which was 3.9 cm with extranodal extension.  Her radiation therapy was delayed slightly by postoperative infection which resolved with antibiotics. She then completed adjuvant radiation therapy as above. Following completion of radiation therapy, she was offered treatment with  xeloda. However, the patient refused and was started on letrozole alone.     Since she was last seen for follow up, Ms. Osorio underwent evaluation with a mammogram on 2/6/2017. This showed no suspicious findings in bilateral breast. She was last seen for follow up with Dr. Patel on 5/8/2017. At that time, she reported multiple side effects related to letrozole including hot flashes, fatigue, and joint stiffness. They discussed the possibility of starting her on exemestane instead, however Ms. Osorio wished to continue on letrazole instead. It was also noted that she had continued swelling of the left breast and axilla. She had been seen by lymphedema recently, however had not been doing her massages or exercises. She now presents to our clinic today for routine follow up, 7 months after completion of radiation.     On exam today, Ms. Osorio reports doing fairly well. Her main complaints are related to swelling of her left breast and axilla, along with decreased range of motion of her left arm. She notes some side effects related to letrazole, which are summarized above. With regard to the swelling of her breast/axilla, she states that she was seen by lymphedema within the past month. We do not have records of this visit in [facility]. She has not been doing the suggested exercises or massages. She has not been wearing any bras yet. She denies any pain in the breast or axilla. She denies any swelling of the arm. She otherwise has no additional concerns or complaints and remainder of ROS is negative.       PHYSICAL EXAMINATION:   VITAL SIGNS: Blood pressure 121/74  Pulse 84  Wt 95.9 kg (211 lb 6.4 oz)   BMI 36.22 kg/m2  .   GENERAL:  Alert, in no distress.   PULMONARY:  No wheezing, stridor or respiratory distress.   CARDIOVASCULAR:  Well perfused.  No cyanosis or edema.   BREASTS:  Well-healed left breast surgical scars and no erythema. Swelling with enlarged pores noted on the left breast. Swelling also noted  within the axilla. Mild hyperpigmentation within the treatment field. No concerning masses in bilateral breasts. No axillary adenopathy appreciated. .   MUSCULOSKELETAL:  Normal bulk and tone. No swelling of the left arm.   NEUROLOGIC:  Alert and oriented.  Cranial nerves grossly intact.  Grossly intact strength and sensation in the extremities.  Normal ambulation.   PSYCHIATRIC:  Appropriate mood and affect.      IMPRESSION:  Ms. Osorio is a 61-year-old woman with invasive ductal carcinoma of the left breast, grade 3, ER/HI positive, HER-2 nonamplified, cT2N1 [id number], status post neoadjuvant chemotherapy, lumpectomy and axillary lymph node dissection who returns for 7 month routine followup after adjuvant radiation to the breast and supraclavicular and axillary lymph nodes.  She continues to have issues with swelling of left breast and axilla.      PLAN:   1.  Recommend continuing with exercises and massages provided by lymphedema. Also urged patient to start using a bra for support, such as a sports bra.      2.  Follow up with Medical Oncology with regular imaging as scheduled  3.  We will have her return to clinic in approximately 1 year for re-evaluation of lymphedema       Chapito Amezcua MD  Resident  Department of Radiation Oncology        cc:   Kanchan Patel MD   [address], [place] [address]      Gianna Amezcua MD   [address], [place] [address]     I saw and examined the patient with the resident.  I have reviewed and agree with the resident's note and plan of care.      Niesha Anne MD    FOLLOW-UP VISIT    Patient Name: Remedios Osorio      : 1955     Age: 61 year old        ______________________________________________________________________________     Chief Complaint   Patient presents with     Cancer     7 month fup for rad therapy  to L breast     /74  Pulse 84  Wt 95.9 kg (211 lb 6.4 oz)   LMP 10/28/2009  BMI 36.22 kg/m2     Date Radiation Completed: 10/17/16    Pain  Denies    Labs  Other Labs: No    Imaging  Mammogram: 2/5/17          Other Appointments:     MD Name: DR. Patel Appointment Date:    MD Name: Appointment Date:   MD Name: Appointment Date:   Other Appointment Notes:     Residual Radiation side effect: none     Additional Instructions:       Again, thank you for allowing me to participate in the care of your patient.      Sincerely,    Niesha Anne MD

## 2017-05-19 NOTE — PROGRESS NOTES
RADIATION ONCOLOGY FOLLOW UP  Date: May 17, 2017      DISEASE TREATED:  Invasive ductal carcinoma of the lower outer quadrant of the left breast, grade 3, ER/IN positive, HER-2 nonamplified, cT2N1, [id number].  Status post chemotherapy, lumpectomy and axillary lymph node dissection.      RADIATION THERAPY DELIVERED:  Adjuvant radiation therapy to the left breast, supraclav and axilla to a total dose of 6040 cGy in 33 fractions completed on 10/17/2016.      INTERVAL SINCE COMPLETION OF RADIATION THERAPY:  7 months.      SUBJECTIVE: Ms. Osorio is a 61-year-old woman who was initially diagnosed with breast cancer after presenting with a mass in her left breast in 01/2016.  This was evaluated with mammogram which showed a 3.3 cm mass in the lower outer quadrant of her left breast with abnormal lymph nodes on ultrasound.  Biopsy of the breast and lymph node were both positive for malignancy.  She underwent a PET scan which showed the breast mass and several positive axillary lymph nodes.  She then received 11 cycles of weekly Taxol and 1 cycle of dose-dense AC.  She did not complete all of her chemotherapy due to intolerance.  She then had a lumpectomy and axillary lymph node dissection on 06/29/2016, positive for invasive ductal carcinoma, grade 2, with 1.6 x 1.4 cm residual tumor , ER/IN positive, HER-2 nonamplified with no lymphovascular space invasion, 3 mm margins and 6 of 19 positive lymph nodes, the largest of which was 3.9 cm with extranodal extension.  Her radiation therapy was delayed slightly by postoperative infection which resolved with antibiotics. She then completed adjuvant radiation therapy as above. Following completion of radiation therapy, she was offered treatment with xeloda. However, the patient refused and was started on letrozole alone.     Since she was last seen for follow up, Ms. Osorio underwent evaluation with a mammogram on 2/6/2017. This showed no suspicious findings in bilateral breast. She  was last seen for follow up with Dr. Patel on 5/8/2017. At that time, she reported multiple side effects related to letrozole including hot flashes, fatigue, and joint stiffness. They discussed the possibility of starting her on exemestane instead, however Ms. Osorio wished to continue on letrazole instead. It was also noted that she had continued swelling of the left breast and axilla. She had been seen by lymphedema recently, however had not been doing her massages or exercises. She now presents to our clinic today for routine follow up, 7 months after completion of radiation.     On exam today, Ms. Osorio reports doing fairly well. Her main complaints are related to swelling of her left breast and axilla, along with decreased range of motion of her left arm. She notes some side effects related to letrazole, which are summarized above. With regard to the swelling of her breast/axilla, she states that she was seen by lymphedema within the past month. We do not have records of this visit in [facility]. She has not been doing the suggested exercises or massages. She has not been wearing any bras yet. She denies any pain in the breast or axilla. She denies any swelling of the arm. She otherwise has no additional concerns or complaints and remainder of ROS is negative.       PHYSICAL EXAMINATION:   VITAL SIGNS: Blood pressure 121/74  Pulse 84  Wt 95.9 kg (211 lb 6.4 oz)   BMI 36.22 kg/m2  .   GENERAL:  Alert, in no distress.   PULMONARY:  No wheezing, stridor or respiratory distress.   CARDIOVASCULAR:  Well perfused.  No cyanosis or edema.   BREASTS:  Well-healed left breast surgical scars and no erythema. Swelling with enlarged pores noted on the left breast. Swelling also noted within the axilla. Mild hyperpigmentation within the treatment field. No concerning masses in bilateral breasts. No axillary adenopathy appreciated. .   MUSCULOSKELETAL:  Normal bulk and tone. No swelling of the left arm.   NEUROLOGIC:  Alert  and oriented.  Cranial nerves grossly intact.  Grossly intact strength and sensation in the extremities.  Normal ambulation.   PSYCHIATRIC:  Appropriate mood and affect.      IMPRESSION:  Ms. Osorio is a 61-year-old woman with invasive ductal carcinoma of the left breast, grade 3, ER/IN positive, HER-2 nonamplified, cT2N1 [id number], status post neoadjuvant chemotherapy, lumpectomy and axillary lymph node dissection who returns for 7 month routine followup after adjuvant radiation to the breast and supraclavicular and axillary lymph nodes.  She continues to have issues with swelling of left breast and axilla.      PLAN:   1.  Recommend continuing with exercises and massages provided by lymphedema. Also urged patient to start using a bra for support, such as a sports bra.      2.  Follow up with Medical Oncology with regular imaging as scheduled  3.  We will have her return to clinic in approximately 1 year for re-evaluation of lymphedema       Chapito Amezcua MD  Resident  Department of Radiation Oncology        cc:   Kanchan Patel MD   [facility]   [address], [place] [address]      Gianna Amezcua MD   [facility]   [address], [facility] [address]   [place] [address]     I saw and examined the patient with the resident.  I have reviewed and agree with the resident's note and plan of care.      Niesha Anne MD

## 2017-05-20 ENCOUNTER — HOSPITAL ENCOUNTER (OUTPATIENT)
Dept: PHYSICAL THERAPY | Facility: CLINIC | Age: 62
Setting detail: THERAPIES SERIES
End: 2017-05-20
Attending: INTERNAL MEDICINE
Payer: COMMERCIAL

## 2017-05-20 DIAGNOSIS — R53.81 PHYSICAL DECONDITIONING: ICD-10-CM

## 2017-05-20 PROCEDURE — 40000719 ZZHC STATISTIC PT DEPARTMENT NEURO VISIT: Performed by: PHYSICAL THERAPIST

## 2017-05-20 PROCEDURE — 97110 THERAPEUTIC EXERCISES: CPT | Mod: GP | Performed by: PHYSICAL THERAPIST

## 2017-05-20 PROCEDURE — 97161 PT EVAL LOW COMPLEX 20 MIN: CPT | Mod: GP | Performed by: PHYSICAL THERAPIST

## 2017-05-20 ASSESSMENT — 6 MINUTE WALK TEST (6MWT): TOTAL DISTANCE WALKED (METERS): 456.59

## 2017-05-20 NOTE — PROGRESS NOTES
" 05/20/17 0904   Quick Adds   Type of Visit Initial OP PT Evaluation   General Information   Start of Care Date 05/20/17   Referring Physician Kanchan Patel MD   Orders Evaluate and Treat as Indicated   Order Date 05/08/17   Medical Diagnosis Deconditioning, L Breast Cancer   Onset of illness/injury or Date of Surgery 05/08/17   Special Instructions Cancer rehab. Deconditioning after cancer treatment   Surgical/Medical history reviewed Yes   Pertinent history of current problem (include personal factors and/or comorbidities that impact the POC) PCP found L breast lumb on palpation 1/2016. Pt dx stage IIIa invasive ductal L breast CA. S/p 11 cycles of Taxol chemo, 12th cycle cancelled d/t neuropathy. Attempted another chemo, but dc after 1 round dt poor tolerance and pt declined further chemo. L breast lumpectomy and axillary LND (19 nodes removed) on 6/30/16. Pt completed radiation therapy 10/17/16. Pt has been on oral Letrozole, hormone based chemo, since 9/2016.  Pt was treated by Lymphedema service. H/o HLD, migraine headaches, morbid obesity with intentional weight loss of 77 lb so far, pt notes goal of 150 lb (today 211 lb). Pt notes \"chemo brain\" memory concerns, fatigue, and neuropathy of feet and fingers that has only minimally changed since treatment.    Previous/Current Treatment Physical Therapy  (Lymphedema treatment)   Improvement after PT Moderate   Current Community Support (Last year friends helped out with everything, now indep)   Patient role/Employment history Employed   Living environment House/townhome  (parents old house)   Home/Community Accessibility Comments 2 step entry via garage. 2 level house + basement, 1 side rail on each.    ADL Devices (none)   Patient/Family Goals Statement Better strength in my arm, better ROM, reguler exercise.    General Information Comments Working with Edema therapy team, no future visits planned. Reports she's been bad about follow through with their " recommendations. Thought she might do exercise portion there too, but referred for OT also. [facility] , back to work full-time, very fatigued. Lost a lot of weight to improve recovery, goal of 150 lb. Was 288 lb. Was walking at the mall with friend 30 min/day, 4 days a week. Then tried going outside but has fallen off schedule d/t weather change, also busy with yardwork. Working full time-  computer work. Exercise rehab Dr recommended 5 days/week.  Might have friend help with mowing, tried once and was very difficult, had even split it up into 2 days but was exhausted.    Fall Risk Screen   Fall screen completed by PT   Per patient - Fall 2 or more times in past year? No   Per patient - Fall with injury in past year? No   Timed Up and Go score (seconds) 6.03   Is patient a fall risk? No   System Outcome Measures   Outcome Measures Cancer Rehab   FACIT Fatigue Subscale (score out of 52). The higher the score, the better the QOL. 20   Six Minute Walk (meters). An increase of 70 or more meters indicates statistically significant change. 456.59  (1498 ft, OMNI 3/10)   Pain   Patient currently in pain Denies   Pain comments Does have pain posterior L shoulder with L active horizontal abduction. And stiffness limiting L shoulder ROM.   Vital Signs   Vital Signs Pulse;BP;SpO2   Pulse 78  (6MW: 77bpm)   /88  (6MW: 121/83)   SpO2 95 %  (6MW: 97%)   Cognitive Status Examination   Orientation orientation to person, place and time   Level of Consciousness alert   Follows Commands and Answers Questions 100% of the time   Personal Safety and Judgment intact   Memory impaired   Cognitive Comment Pt reports memory concern. Recently ran errand to bank with important paperwork, then later noted she had lost paperwork with her personal information, not sure where she had left it, maybe at [employer] but not sure.    Posture   Posture Comments L shoulder is lower than R, L scapula  "mildly abducted/downwardly rotated compared to R. Lordotic posture, drop off at ~C6 with forward head posture   Range of Motion (ROM)   ROM Comment B LEs WFL, mild stiffness of HS/calves, potential shortened hip flexors per lordotic posture   Left Shoulder Flexion ROM   Left Shoulder Flexion AROM - degrees 125   Left Shoulder ABduction ROM   Left Shoulder ABduction AROM - degrees 106  (end range stiffness)   Left Shoulder Horizontal ABduction ROM   Left Shoulder Horizontal ABduction AROM - degrees ROM is full but painful in posterior shoulder end range   Left Shoulder External Rotation ROM   Left Shoulder External Rotation AROM - degrees 72  (with elbow at side)   Left Shoulder Internal Rotation ROM   Left Shoulder Internal Rotation AROM - degrees able to reach behind back just above waist band   Strength   Strength Comments R UE 5/5 throughout, L shoulder flexion and abd 4+/5, bicep/tricep/wrist and hand 5/5; Hip flexion 4+/5 B, othewise B LEs 5/5 throughout (seated)   Transfer Skills   Transfer Comments Independent from 17\" chair   Gait   Gait Comments Pt amb with mild path deviation, reduced arm swing, L shoulder lower than R, downward gaze.   Gait Special Tests   Gait Special Tests 25 FOOT TIMED WALK   Gait Special Tests 25 Foot Timed Walk   Seconds 5.78   Steps 11 Steps   Balance   Balance Comments Mild path deviation during gait, will plan FGA next session d/t time constraints.   Balance Special Tests   Balance Special Tests Romberg   Balance Special Tests Romberg   Seconds 30 Seconds   Comments Stable but pt reports a little anxiety   Sensory Examination   Sensory Perception Comments N/T fingers and feet, chemo related neuropathy- minimal improvement.Numbness of L underarm and chest in area of surgery. Pt notes, she'd prefer to forget about/detach from thinking about that whole area.    Muscle Tone   Muscle Tone no deficits were identified   Modality Interventions   Planned Modality Interventions Comments " ice/heat PRN   Planned Therapy Interventions   Planned Therapy Interventions balance training;gait training;neuromuscular re-education;ROM;strengthening;stretching;transfer training   Clinical Impression   Criteria for Skilled Therapeutic Interventions Met yes, treatment indicated   PT Diagnosis deconditioning, impaired functional use of L UE   Influenced by the following impairments Strength, ROM, sensation, conditioning, balance, cognition/memory, fatigue   Functional limitations due to impairments Below baseline activity tolerance d/t fatigue and decondiitoining following cancer treatment, and impaired functional use of L UE for ADLs/IADLs   Clinical Presentation Stable/Uncomplicated   Clinical Presentation Rationale Overall stable but with ongoing fatigue and functional limitations related to cancer [place].   Clinical Decision Making (Complexity) Low complexity   Therapy Frequency 2 times/Week   Predicted Duration of Therapy Intervention (days/wks) 6 weeks   Risk & Benefits of therapy have been explained Yes   Patient, Family & other staff in agreement with plan of care Yes   Education Assessment   Barriers to Learning No barriers   GOALS   PT Eval Goals 1;2;3   Goal 1   Goal Identifier FACIT   Goal Description Pt will demonstrate improvement in IADL participation, via subjective ratings of fatigue via FACIT of >38/52.    Target Date 08/17/17   Goal 2   Goal Identifier 6MW   Goal Description Pt will demonstrate improved functional activity tolerance via improved distance achieved on 6MW >1765ft, consistent with gender/age-matched norms.   Target Date 08/17/17   Goal 3   Goal Identifier HEP   Goal Description Pt will demonstrate independence with HEP designed to address functional strength, ROM/flexibility, balance, and conditioning including goal of consistent aerobic activity 30 min/day, 5 days/week.    Target Date 08/17/17   Total Evaluation Time   Total Evaluation Time (Minutes) 30     Mita Mcknight PT,  DPT  Physical Therapist  [facility]  [place] [address]  [email or web address]  Office: [phone]  Pager: [phone]  Fax: [phone]

## 2017-05-23 NOTE — ADDENDUM NOTE
Encounter addended by: Debora Mcknight PT on: 5/23/2017  7:29 AM<BR>     Actions taken: Flowsheet accepted

## 2017-05-25 ENCOUNTER — HOSPITAL ENCOUNTER (OUTPATIENT)
Dept: PHYSICAL THERAPY | Facility: CLINIC | Age: 62
Setting detail: THERAPIES SERIES
End: 2017-05-25
Attending: INTERNAL MEDICINE
Payer: COMMERCIAL

## 2017-05-25 PROCEDURE — 40000360 ZZHC STATISTIC PT CANCER REHAB VISIT: Performed by: PHYSICAL THERAPIST

## 2017-05-25 PROCEDURE — 97110 THERAPEUTIC EXERCISES: CPT | Mod: GP | Performed by: PHYSICAL THERAPIST

## 2017-05-30 ENCOUNTER — HOSPITAL ENCOUNTER (OUTPATIENT)
Dept: OCCUPATIONAL THERAPY | Facility: CLINIC | Age: 62
Setting detail: THERAPIES SERIES
End: 2017-05-30
Attending: INTERNAL MEDICINE
Payer: COMMERCIAL

## 2017-05-30 DIAGNOSIS — R41.3 MEMORY LOSS: ICD-10-CM

## 2017-05-30 DIAGNOSIS — T73.2XXD FATIGUE DUE TO EXPOSURE, SUBSEQUENT ENCOUNTER: ICD-10-CM

## 2017-05-30 PROCEDURE — 40000361 ZZHC STATISTIC OT CANCER REHAB VISIT: Performed by: OCCUPATIONAL THERAPIST

## 2017-05-30 PROCEDURE — 97535 SELF CARE MNGMENT TRAINING: CPT | Mod: GO | Performed by: OCCUPATIONAL THERAPIST

## 2017-05-30 PROCEDURE — 97165 OT EVAL LOW COMPLEX 30 MIN: CPT | Mod: GO | Performed by: OCCUPATIONAL THERAPIST

## 2017-05-30 ASSESSMENT — ACTIVITIES OF DAILY LIVING (ADL): IADL_QUICK_ADDS: MEAL PLANNING/PREPARATION;HOME/FINANCIAL/MANAGEMENT;COMMUNICATION/COMPUTER USE;COMMUNITY MOBILITY

## 2017-05-30 NOTE — ADDENDUM NOTE
Encounter addended by: Carmen Fish, PT on: 5/30/2017 12:17 PM<BR>     Actions taken: Flowsheet accepted

## 2017-06-07 ENCOUNTER — HOSPITAL ENCOUNTER (OUTPATIENT)
Dept: PHYSICAL THERAPY | Facility: CLINIC | Age: 62
Setting detail: THERAPIES SERIES
End: 2017-06-07
Attending: INTERNAL MEDICINE
Payer: COMMERCIAL

## 2017-06-07 PROCEDURE — 97110 THERAPEUTIC EXERCISES: CPT | Mod: GP | Performed by: PHYSICAL THERAPIST

## 2017-06-07 PROCEDURE — 40000360 ZZHC STATISTIC PT CANCER REHAB VISIT: Performed by: PHYSICAL THERAPIST

## 2017-06-08 ENCOUNTER — HOSPITAL ENCOUNTER (OUTPATIENT)
Dept: PHYSICAL THERAPY | Facility: CLINIC | Age: 62
Setting detail: THERAPIES SERIES
End: 2017-06-08
Attending: INTERNAL MEDICINE
Payer: COMMERCIAL

## 2017-06-08 PROCEDURE — 97110 THERAPEUTIC EXERCISES: CPT | Mod: GP | Performed by: PHYSICAL THERAPIST

## 2017-06-08 PROCEDURE — 40000360 ZZHC STATISTIC PT CANCER REHAB VISIT: Performed by: PHYSICAL THERAPIST

## 2017-06-13 DIAGNOSIS — C50.512 MALIGNANT NEOPLASM OF LOWER-OUTER QUADRANT OF LEFT FEMALE BREAST (H): ICD-10-CM

## 2017-06-13 RX ORDER — LETROZOLE 2.5 MG/1
2.5 TABLET, FILM COATED ORAL DAILY
Qty: 90 TABLET | Refills: 3 | Status: SHIPPED | OUTPATIENT
Start: 2017-06-13 | End: 2018-06-04

## 2017-06-13 NOTE — TELEPHONE ENCOUNTER
Letrozole 2.5 mg      Last Written Prescription Date: 8/9/16  Last Fill Quantity: 30,  # refills: 11   Last Office Visit with [facility] or [facility] prescribing provider: 5/8/17 with Dr. Patel  Next office visit: 8/7/17 with Dr. Patel    Pt requesting 90-day supply. Erx sent for 90-day and 3 refills.

## 2017-06-15 ENCOUNTER — HOSPITAL ENCOUNTER (OUTPATIENT)
Dept: PHYSICAL THERAPY | Facility: CLINIC | Age: 62
Setting detail: THERAPIES SERIES
End: 2017-06-15
Attending: INTERNAL MEDICINE
Payer: COMMERCIAL

## 2017-06-15 PROCEDURE — 40000360 ZZHC STATISTIC PT CANCER REHAB VISIT: Performed by: PHYSICAL THERAPIST

## 2017-06-15 PROCEDURE — 97110 THERAPEUTIC EXERCISES: CPT | Mod: GP | Performed by: PHYSICAL THERAPIST

## 2017-06-15 NOTE — DISCHARGE INSTRUCTIONS
"6/15/17    Good job - keep doing walks during the day.   It \"adds minutes\" to your week.       See phone picture re; half sidelying.    When you lie on back - do some with \"palm up\" on left side to stretch pectorals on shoulder.       On your list -   Bike at home ([place])   Get a ball    Good job.  Carmen  PT  "

## 2017-06-15 NOTE — IP AVS SNAPSHOT
MRN:[id number]                      After Visit Summary   6/15/2017    Remedios Osorio    MRN: [id number]           Visit Information        Provider Department      6/15/2017  8:00 AM Carmen Fish, PT [facility]        Your next 10 appointments already scheduled     Jun 16, 2017  8:00 AM CDT   Cancer Rehab Treatment with Carmen Fish, PT   [facility] ([facility])    [address], [address]  [place] [address]   [phone]            Jun 20, 2017  8:00 AM CDT   Cancer Rehab Treatment with Carmen Fish, PT   [facility] ([facility])    [address], [address]  [place] [address]   [phone]            Jun 22, 2017  8:00 AM CDT   Cancer Rehab Treatment with Carmen Fish, PT   [facility] ([facility])    [address], [address]  [place] [address]   [phone]            Jun 26, 2017  8:00 AM CDT   Cancer Rehab Treatment with Darío Macedo, OT   [facility] ([facility])    [address], [address]  [place] [address]   [phone]            Jun 27, 2017  8:00 AM CDT   Cancer Rehab Treatment with Carmen Fish, PT   [facility] ([facility])    [address]  [place] [address]   [phone]            Jun 28, 2017  8:00 AM CDT   Cancer Rehab Treatment with Carmen Fish, PT   [facility] ([facility])    [address], [address]  [place] [address]   [phone]            Jul 11, 2017  8:00 AM CDT   Cancer Rehab Treatment  "with Darío Macedo OT   [facility] ([facility])    [address]  [place] [address]   [phone]            Jul 18, 2017  8:00 AM CDT   Cancer Rehab Treatment with Darío Macedo OT   [facility] ([facility])    [address]  [place] [address]   [phone]            Jul 25, 2017  8:00 AM CDT   Cancer Rehab Treatment with Darío Macedo OT   [facility])    [address]  [place] [address]   [phone]            Aug 07, 2017  2:45 PM CDT   [facility] with  [facility]   [facility] ([facility] [place])    [address] [id number] [phone]                Further instructions from your care team       6/15/17    Good job - keep doing walks during the day.   It \"adds minutes\" to your week.       See phone picture re; half sidelying.    When you lie on back - do some with \"palm up\" on left side to stretch pectorals on shoulder.       On your list -   Bike at home ([place]winn)   Get a ball    Good job.  Carmen DUTTA    [facility] Information     [facility] gives you secure access to your electronic health record. If you see a primary care provider, you can also send messages to your care team and make appointments. If you have questions, please call your primary care clinic.  If you do not have a primary care provider, please call [phone] and they will assist you.        Care EveryWhere ID     This is your Care EveryWhere ID. This could be used by other organizations to access your [facility] medical records  [id number]        "

## 2017-06-16 ENCOUNTER — HOSPITAL ENCOUNTER (OUTPATIENT)
Dept: PHYSICAL THERAPY | Facility: CLINIC | Age: 62
Setting detail: THERAPIES SERIES
End: 2017-06-16
Attending: INTERNAL MEDICINE
Payer: COMMERCIAL

## 2017-06-16 PROCEDURE — 97110 THERAPEUTIC EXERCISES: CPT | Mod: GP | Performed by: PHYSICAL THERAPIST

## 2017-06-16 PROCEDURE — 40000360 ZZHC STATISTIC PT CANCER REHAB VISIT: Performed by: PHYSICAL THERAPIST

## 2017-06-20 ENCOUNTER — HOSPITAL ENCOUNTER (OUTPATIENT)
Dept: PHYSICAL THERAPY | Facility: CLINIC | Age: 62
Setting detail: THERAPIES SERIES
End: 2017-06-20
Attending: INTERNAL MEDICINE
Payer: COMMERCIAL

## 2017-06-20 PROCEDURE — 40000360 ZZHC STATISTIC PT CANCER REHAB VISIT: Performed by: PHYSICAL THERAPIST

## 2017-06-20 PROCEDURE — 97110 THERAPEUTIC EXERCISES: CPT | Mod: GP | Performed by: PHYSICAL THERAPIST

## 2017-06-22 ENCOUNTER — HOSPITAL ENCOUNTER (OUTPATIENT)
Dept: PHYSICAL THERAPY | Facility: CLINIC | Age: 62
Setting detail: THERAPIES SERIES
End: 2017-06-22
Attending: INTERNAL MEDICINE
Payer: COMMERCIAL

## 2017-06-22 PROCEDURE — 97110 THERAPEUTIC EXERCISES: CPT | Mod: GP | Performed by: PHYSICAL THERAPIST

## 2017-06-22 PROCEDURE — 40000360 ZZHC STATISTIC PT CANCER REHAB VISIT: Performed by: PHYSICAL THERAPIST

## 2017-06-27 ENCOUNTER — HOSPITAL ENCOUNTER (OUTPATIENT)
Dept: PHYSICAL THERAPY | Facility: CLINIC | Age: 62
Setting detail: THERAPIES SERIES
End: 2017-06-27
Attending: INTERNAL MEDICINE
Payer: COMMERCIAL

## 2017-06-27 PROCEDURE — 97110 THERAPEUTIC EXERCISES: CPT | Mod: GP | Performed by: PHYSICAL THERAPIST

## 2017-06-27 PROCEDURE — 40000360 ZZHC STATISTIC PT CANCER REHAB VISIT: Performed by: PHYSICAL THERAPIST

## 2017-06-28 ENCOUNTER — HOSPITAL ENCOUNTER (OUTPATIENT)
Dept: PHYSICAL THERAPY | Facility: CLINIC | Age: 62
Setting detail: THERAPIES SERIES
End: 2017-06-28
Attending: INTERNAL MEDICINE
Payer: COMMERCIAL

## 2017-06-28 PROCEDURE — 97110 THERAPEUTIC EXERCISES: CPT | Mod: GP | Performed by: PHYSICAL THERAPIST

## 2017-06-28 PROCEDURE — 40000360 ZZHC STATISTIC PT CANCER REHAB VISIT: Performed by: PHYSICAL THERAPIST

## 2017-07-11 ENCOUNTER — HOSPITAL ENCOUNTER (OUTPATIENT)
Dept: OCCUPATIONAL THERAPY | Facility: CLINIC | Age: 62
Setting detail: THERAPIES SERIES
End: 2017-07-11
Attending: INTERNAL MEDICINE
Payer: COMMERCIAL

## 2017-07-11 PROCEDURE — 97535 SELF CARE MNGMENT TRAINING: CPT | Mod: GO | Performed by: OCCUPATIONAL THERAPIST

## 2017-07-11 PROCEDURE — 40000361 ZZHC STATISTIC OT CANCER REHAB VISIT: Performed by: OCCUPATIONAL THERAPIST

## 2017-07-11 NOTE — ADDENDUM NOTE
Encounter addended by: Darío Macedo OT on: 7/11/2017  7:47 AM<BR>     Actions taken: Sign clinical note, Flowsheet accepted

## 2017-07-11 NOTE — PROGRESS NOTES
" 05/30/17 0800   Quick Adds   Type of Visit Initial Outpatient Occupational Therapy Evaluation   General Information   Start Of Care Date 05/30/17   Referring Physician Kanchan Patel MD   Orders Evaluate and treat as indicated   Orders Date 05/08/17   Medical Diagnosis Breast CA; s/p chemotherapy, lumpectomy, radiation   Onset of Illness/Injury or Date of Surgery 05/08/17   Special Instructions Fatigue and memory loss following breast CA treatment   Surgical/Medical History Reviewed Yes   Additional Occupational Profile Info/Pertinent History of Current Problem Pt is 61 y.o. female who lives alone in a house with her two dogs and works full-time as an  for [employer]. Per PT eval: PCP found L breast lumb on palpation 1/2016. Pt dx stage IIIa invasive ductal L breast CA. S/p 11 cycles of Taxol chemo, 12th cycle cancelled d/t neuropathy. Attempted another chemo, but dc after 1 round dt poor tolerance and pt declined further chemo. L breast lumpectomy and axillary LND (19 nodes removed) on 6/30/16. Pt completed radiation therapy 10/17/16. Pt has been on oral Letrozole, hormone based chemo, since 9/2016.  Pt was treated by Lymphedema service. H/o HLD, migraine headaches, morbid obesity with intentional weight loss of 77 lb so far, pt notes goal of 150 lb (today 211 lb). Pt notes \"chemo brain\" memory concerns, fatigue, and neuropathy of feet and fingers that has only minimally changed since treatment.    Comments/Observations Pt alone today for appointment. Arrives late and endorses being \"flustered\", unsure of appointment time, and difficulty getting out of the house this morning.    Role/Living Environment   Current Community Support Therapy services  (OP PT and hx of lymphedema services)   Patient role/Employment history Employed;Homemaker  (Working full time with [employer] )   Community/Avocational Activities Pt reports she is not doing much outside of working and " taking care of IADLs; has two miniature poodles.   Current Living Environment House   Number of Stairs to Enter Home 2, via garage   Number of Stairs Within Home Two flights; bedrooms/bath on second floor, basement laundry. Handrail present on one side.   Primary Bathroom Location/Comments With bedroom, second floor.   Primary Bathroom Set Up/Equipment Shower stall  (Small bathroom, limited space for equipment)   Home/Community Accessibility Comments Pt reports no concerns managing stairs at home; no concerns with driving.   Prior Level - Transfers Independent   Prior Level - Ambulation Independent   Prior Level - ADLS Independent   Prior Responsibilities - IADL Meal Preparation;Housekeeping;Laundry;Shopping;Yardwork;Finances;Medication management;Driving;Work  (Mowing very difficult; needing to do it in two days)   Prior Level Comments Was getting help last year, now IND. Did get help with yardwork recently.   Role/Living Environment Comments Pt lives alone with 2 dogs and is working full-time. Patient reports not having energy for much of anything outside of work/home responsibility. Would like to get back to playing flute.    Patient/family Goals Statement Wants to improve working memory- be able to remember having conversations with people; improve strength and endurance, manage fatigue.    Pain   Patient currently in pain Denies   Pain comments No acute pain when asked but reports pain (lateral surface of L shoulder, L axilla) with any shoulder activity.    Fall Risk Screen   Fall screen completed by PT;Refer to Documentation  (Defer to PT)   Is the patient a fall risk? No   Comments Defer to PT   System Outcome Measures   FACIT Fatigue Subscale (score out of 52). The higher the score, the better the QOL. 20  (5/20/2017)   Cognitive Status Examination   Orientation Orientation to person, place and time   Level of Consciousness Alert   Follows Commands and Answers Questions 100% of the time   Personal Safety and  Judgment Intact   Memory Impaired   Memory Comments Per pt report, pt having difficulty with working memory, delayed recall. Will forget content of recent conversations, forgets who people are at work, recently lost important paper work during trip to bank.    Attention Reports problems attending   Organization/Problem Solving Reports problems with organization   Executive Function Working memory impaired, decreased storage of information for performing tasks  (Reports conversational working memory impaired)   Cognitive Comment 26/30 on MoCA (norm is 26 and above); with deficits in delayed recall and language (word finding).   Visual Perception   Visual Perception Wears glasses  (Wears contacts and glasses for reading)   Visual Perception Comments Reports changes in past year that have been addressed    Sensation   Sensation Comments Protective sensation intact; mild impairment with light touch on radial side bilaterally, R with more impairment than L (missed about 10% of light-touch monofilament overall). Pt reports more numbness in R hand.   Range of Motion (ROM)   ROM Comments AROM: L shoulder flexion limited to ~120 degrees and otherwise L WNL; R side WNL.    Strength   Strength Comments 4+ shoulder/elbow/wrist bilaterally.    Hand Strength   Hand Dominance Right   Left Hand  (pounds) 53 pounds   Right Hand  (pounds) 70 pounds   Left Lateral Pinch (pounds) 17 pounds   Right Lateral Pinch (pounds) 18 pounds   Left Three Point Pinch (pounds) 14 pounds   Right Three Point Pinch (pounds) 16 pounds   Hand Strength Comments Patient WNL on all /pinch measures but does endorse some fatigue with exertion of testing.    Coordination   Left Hand, Nine Hole Peg Test (seconds) 19   Right Hand, Nine Hole Peg Test (seconds) 26   Coordination Comments WNL on 9-hole peg test, drops pegs with R hand.   Functional Mobility   Ambulation INd   Transfer Skill   Level of [place]: Transfers independent   Bathing    Level of [place] - Bathing independent   Upper Body Dressing   Level of [place]: Dress Upper Body independent   Lower Body Dressing   Level of [place]: Dress Lower Body independent   Toileting   Level of [place]: Toilet independent   Grooming   Level of [place]: Grooming independent   Eating/Self-Feeding   Level of [place]: Eating independent   Activity Tolerance   Activity Tolerance Patient reports daily fatigue both with activity and independent of activity. Energy does not seem to improve with sleep. Patient notes that she is more fatigued now that last year when going through radiation.    Instrumental Activities of Daily Living Assessment   IADL Quick Adds Meal Planning/Preparation;Home/Financial/Management;Communication/Computer Use;Community Mobility   Meal Planning/Preparation Tried to make things from scratch because of diet; very fatigued when doing this.    Home/Financial Management Losing paper at the bank; forgets to pay a bill and says this is uncharacterstic of her.   Communication/Computer Use Uses computer for work; no change in tolerance.    Community Mobility Driving okay now that arm function improved -no issues noted.    Planned Therapy Interventions   Planned Therapy Interventions ADL training;IADL training;Self care/Home management;Strengthening;Therapeutic activities  (and fatigue management and cognitive compensation)   Adult OT Eval Goals   OT Eval Goals (Adult) 3;2;1   OT Goal 1   Goal Identifier Fatigue Management   Goal Description Pt will verbalize increased knowledge of energy management principles & will report at least 2 strategies (personal changes or environmental modifications) that she will incorporate, resulting in decreased fatigue impacting I/ADL performance.   Target Date 07/29/17   OT Goal 2   Goal Identifier Cognition   Goal Description With increased knowledge re: cancer-related fatigue impact on cognitive performance, pt will report that  she is using up to 2 new compensatory strategies with reports of improved concentration and recall with complex tasks to return to PLOF with I/ADLS.   Target Date 07/29/17   OT Goal 3   Goal Identifier HEP   Goal Description Pt will demonstrate independence with U/E strengthening & coordination home exercise program (HEP) for optimal I/ADL performance.   Target Date 07/29/17   Clinical Impression   Criteria for Skilled Therapeutic Interventions Met Yes, treatment indicated   OT Diagnosis Below baseline activity tolerance, decreased performance with I/ADLs.   Influenced by the following impairments Impaired cognition, fatigue, sensation, ROM, decreased strength/endurance.   Assessment of Occupational Performance 5 or more Performance Deficits   Identified Performance Deficits Opening packages, playing flute, conversation/socialization skills, home mgmt, meal prep, mowing lawn.   Clinical Decision Making (Complexity) Low complexity   Therapy Frequency 1x/week   Predicted Duration of Therapy Intervention (days/wks) 8 weeks   Risks and Benefits of Treatment have been explained. Yes   Patient, Family & other staff in agreement with plan of care Yes   Clinical Impression Comments Pt would benefit from skilled OT services to improve I/ADL performance.   Education Assessment   Barriers To Learning Cognitive   Preferred Learning Style Reading;Listening;Demonstration   Total Evaluation Time   Total Evaluation Time 35

## 2017-07-18 ENCOUNTER — HOSPITAL ENCOUNTER (OUTPATIENT)
Dept: OCCUPATIONAL THERAPY | Facility: CLINIC | Age: 62
Setting detail: THERAPIES SERIES
End: 2017-07-18
Attending: INTERNAL MEDICINE
Payer: COMMERCIAL

## 2017-07-18 PROCEDURE — 40000361 ZZHC STATISTIC OT CANCER REHAB VISIT: Performed by: OCCUPATIONAL THERAPIST

## 2017-07-18 PROCEDURE — 97535 SELF CARE MNGMENT TRAINING: CPT | Mod: GO | Performed by: OCCUPATIONAL THERAPIST

## 2017-07-25 ENCOUNTER — HOSPITAL ENCOUNTER (OUTPATIENT)
Dept: OCCUPATIONAL THERAPY | Facility: CLINIC | Age: 62
Setting detail: THERAPIES SERIES
End: 2017-07-25
Attending: INTERNAL MEDICINE
Payer: COMMERCIAL

## 2017-07-25 PROCEDURE — 97535 SELF CARE MNGMENT TRAINING: CPT | Mod: GO | Performed by: OCCUPATIONAL THERAPIST

## 2017-07-25 PROCEDURE — 40000361 ZZHC STATISTIC OT CANCER REHAB VISIT: Performed by: OCCUPATIONAL THERAPIST

## 2017-07-29 ENCOUNTER — HEALTH MAINTENANCE LETTER (OUTPATIENT)
Age: 62
End: 2017-07-29

## 2017-08-07 ENCOUNTER — APPOINTMENT (OUTPATIENT)
Dept: LAB | Facility: CLINIC | Age: 62
End: 2017-08-07
Attending: INTERNAL MEDICINE
Payer: COMMERCIAL

## 2017-08-07 ENCOUNTER — ONCOLOGY VISIT (OUTPATIENT)
Dept: ONCOLOGY | Facility: CLINIC | Age: 62
End: 2017-08-07
Attending: INTERNAL MEDICINE
Payer: COMMERCIAL

## 2017-08-07 VITALS
DIASTOLIC BLOOD PRESSURE: 83 MMHG | SYSTOLIC BLOOD PRESSURE: 135 MMHG | BODY MASS INDEX: 36.33 KG/M2 | RESPIRATION RATE: 16 BRPM | TEMPERATURE: 98.1 F | OXYGEN SATURATION: 97 % | WEIGHT: 212 LBS | HEART RATE: 86 BPM

## 2017-08-07 DIAGNOSIS — C50.512 MALIGNANT NEOPLASM OF LOWER-OUTER QUADRANT OF LEFT FEMALE BREAST (H): ICD-10-CM

## 2017-08-07 DIAGNOSIS — E03.2 HYPOTHYROIDISM DUE TO NON-MEDICATION EXOGENOUS SUBSTANCES: ICD-10-CM

## 2017-08-07 DIAGNOSIS — C50.512 MALIGNANT NEOPLASM OF LOWER-OUTER QUADRANT OF LEFT BREAST OF FEMALE, ESTROGEN RECEPTOR POSITIVE (H): Primary | ICD-10-CM

## 2017-08-07 DIAGNOSIS — Z17.0 MALIGNANT NEOPLASM OF LOWER-OUTER QUADRANT OF LEFT BREAST OF FEMALE, ESTROGEN RECEPTOR POSITIVE (H): Primary | ICD-10-CM

## 2017-08-07 LAB
ALBUMIN SERPL-MCNC: 3.7 G/DL (ref 3.4–5)
ALP SERPL-CCNC: 88 U/L (ref 40–150)
ALT SERPL W P-5'-P-CCNC: 30 U/L (ref 0–50)
ANION GAP SERPL CALCULATED.3IONS-SCNC: 9 MMOL/L (ref 3–14)
AST SERPL W P-5'-P-CCNC: 17 U/L (ref 0–45)
BASOPHILS # BLD AUTO: 0 10E9/L (ref 0–0.2)
BASOPHILS NFR BLD AUTO: 0.7 %
BILIRUB SERPL-MCNC: 0.3 MG/DL (ref 0.2–1.3)
BUN SERPL-MCNC: 16 MG/DL (ref 7–30)
CALCIUM SERPL-MCNC: 9.2 MG/DL (ref 8.5–10.1)
CHLORIDE SERPL-SCNC: 104 MMOL/L (ref 94–109)
CO2 SERPL-SCNC: 26 MMOL/L (ref 20–32)
CREAT SERPL-MCNC: 0.67 MG/DL (ref 0.52–1.04)
DIFFERENTIAL METHOD BLD: NORMAL
EOSINOPHIL # BLD AUTO: 0.2 10E9/L (ref 0–0.7)
EOSINOPHIL NFR BLD AUTO: 4.1 %
ERYTHROCYTE [DISTWIDTH] IN BLOOD BY AUTOMATED COUNT: 14.7 % (ref 10–15)
GFR SERPL CREATININE-BSD FRML MDRD: 90 ML/MIN/1.7M2
GLUCOSE SERPL-MCNC: 96 MG/DL (ref 70–99)
HCT VFR BLD AUTO: 38.1 % (ref 35–47)
HGB BLD-MCNC: 12.4 G/DL (ref 11.7–15.7)
IMM GRANULOCYTES # BLD: 0 10E9/L (ref 0–0.4)
IMM GRANULOCYTES NFR BLD: 0.2 %
LYMPHOCYTES # BLD AUTO: 0.9 10E9/L (ref 0.8–5.3)
LYMPHOCYTES NFR BLD AUTO: 21.7 %
MCH RBC QN AUTO: 29.9 PG (ref 26.5–33)
MCHC RBC AUTO-ENTMCNC: 32.5 G/DL (ref 31.5–36.5)
MCV RBC AUTO: 92 FL (ref 78–100)
MONOCYTES # BLD AUTO: 0.5 10E9/L (ref 0–1.3)
MONOCYTES NFR BLD AUTO: 10.7 %
NEUTROPHILS # BLD AUTO: 2.6 10E9/L (ref 1.6–8.3)
NEUTROPHILS NFR BLD AUTO: 62.6 %
NRBC # BLD AUTO: 0 10*3/UL
NRBC BLD AUTO-RTO: 0 /100
PLATELET # BLD AUTO: 230 10E9/L (ref 150–450)
POTASSIUM SERPL-SCNC: 3.9 MMOL/L (ref 3.4–5.3)
PROT SERPL-MCNC: 7.3 G/DL (ref 6.8–8.8)
RBC # BLD AUTO: 4.15 10E12/L (ref 3.8–5.2)
SODIUM SERPL-SCNC: 139 MMOL/L (ref 133–144)
T4 FREE SERPL-MCNC: 0.81 NG/DL (ref 0.76–1.46)
TSH SERPL DL<=0.005 MIU/L-ACNC: 4.37 MU/L (ref 0.4–4)
WBC # BLD AUTO: 4.2 10E9/L (ref 4–11)

## 2017-08-07 PROCEDURE — 84439 ASSAY OF FREE THYROXINE: CPT | Performed by: INTERNAL MEDICINE

## 2017-08-07 PROCEDURE — 84443 ASSAY THYROID STIM HORMONE: CPT | Performed by: INTERNAL MEDICINE

## 2017-08-07 PROCEDURE — 85025 COMPLETE CBC W/AUTO DIFF WBC: CPT | Performed by: INTERNAL MEDICINE

## 2017-08-07 PROCEDURE — 36415 COLL VENOUS BLD VENIPUNCTURE: CPT

## 2017-08-07 PROCEDURE — 99214 OFFICE O/P EST MOD 30 MIN: CPT | Mod: ZP | Performed by: INTERNAL MEDICINE

## 2017-08-07 PROCEDURE — 80053 COMPREHEN METABOLIC PANEL: CPT | Performed by: INTERNAL MEDICINE

## 2017-08-07 PROCEDURE — 99212 OFFICE O/P EST SF 10 MIN: CPT | Mod: ZF

## 2017-08-07 ASSESSMENT — PAIN SCALES - GENERAL: PAINLEVEL: NO PAIN (0)

## 2017-08-07 NOTE — NURSING NOTE
Chief Complaint   Patient presents with     Oncology Clinic Visit     Return-Breast Ca     Blood Draw     Labs collected from . vs taken. checked in for appt     Labs drawn from  right lower forearm. Vitals taken. Pt checked in for appointment(s).    Darleen Chris RN

## 2017-08-07 NOTE — NURSING NOTE
"Oncology Rooming Note    August 7, 2017 3:08 PM   Remedios Osorio is a 61 year old female who presents for:    Chief Complaint   Patient presents with     Oncology Clinic Visit     Return-Breast Ca     Blood Draw     Labs collected from . vs taken. checked in for appt     Initial Vitals: /83 (BP Location: Right arm, Patient Position: Sitting, Cuff Size: Adult Regular)  Pulse 86  Temp 98.1  F (36.7  C) (Oral)  Resp 16  Wt 96.2 kg (212 lb)  LMP 10/28/2009  SpO2 97%  BMI 36.33 kg/m2 Estimated body mass index is 36.33 kg/(m^2) as calculated from the following:    Height as of 5/8/17: 1.627 m (5' 4.06\").    Weight as of this encounter: 96.2 kg (212 lb). Body surface area is 2.09 meters squared.  No Pain (0) Comment: Data Unavailable   Patient's last menstrual period was 10/28/2009.  Allergies reviewed: Yes  Medications reviewed: Yes    Medications: Medication refills not needed today.  Pharmacy name entered into [facility]:    [facility] - [place], [place] - [address].  [id number] - [place], [place] - [address]  [id number] - [place] - [address] [facility] [place] - [address] [facility] [place], [place] - [address] [facility] [place], [place] - [address]  [facility] - [place], [place] - [address]    Clinical concerns: No new concerns,    6 minutes for nursing intake (face to face time)     Nataly Philippe LPN            "

## 2017-08-07 NOTE — PROGRESS NOTES
MEDICAL ONCOLOGY FOLLOW-UP PATIENT VISIT     NAME: Remedios Osorio     DATE: 8/7/2017    PRIMARY CARE PHYSICIAN: Kae Caal    PATIENT ID: Clinical Stage II-B Breast Cancer    Oncology History: Remedios Osorio is a 61 year old female with history of obesity, prediabetes and iron deficiency anemia with stage IIIa, [id number], ER positive, MS positive, HER2 non-amplified invasive ductal carcinoma of the left breast. Her PCP palpated a breast mass in the lower outer left breast in 01/2016. Diagnostic mammogram and ultrasound showed a hyperdense mass at 4:30, 8 cm from the nipple, measuring 3.3 cm on mammogram and 2.1 cm on ultrasound. She was also found to have multiple abnormal left axillary lymph nodes. The largest one was 2.4 cm. Core needle biopsy of her left breast mass demonstrated invasive ductal carcinoma, grade 3 with extensive tumor necrosis. Axillary LN biopsy was also positive for malignancy, consistent with breast primary. The tumor cells are strongly positive for estrogen receptor (> 95%) and are moderate to strongly positive for progesterone receptor (60-70%). HER2/lizz was non-amplified by FISH.  PET/CT showed the left breast mass, 5 hypermetabolic left axillary lymph nodes, indeterminate left cervical chain lymph nodes, and an indeterminate hypodensity in the dome of the liver, but was negative for distant metastasis. She declined enrollment in the I-SPY2 clinical trial and agreed to proceed with neoadjuvant chemotherapy, she received 11 cycles of weekly Taxol, the 12th was cancelled due to neuropathy. She received one cycle of ddAC but did not tolerate this well due to fatigue and generalized weakness. She declined any further chemotherapy.    Left breast lumpectomy and left axillary lymph node dissection was performed by Dr. Amezcua on 6/30/16.  Pathology showed a residual 1.6 cm grade 2, IDC in the left breast.  Invasive surgical margin was negative by 3 mm.  There was moderate intratumoral  fibrosis, with final tumor cellularity of 30%.  Tumor infiltrating lymphocytes were estimated at 50%.  6/19 excised lymph nodes were involved with malignancy.  The largest lymph node metastasis measured 3.9 cm and had a 1 mm area of extranodal extension.  Minimal treatment related changes were noted in the lymph nodes.  Pathologic staging was [id number], or stage IIIa.  [facility] residual cancer burden was Class III.  I met with Remedios in clinic on 7/11/16.  Unfortunately she was not eligible for ARIADNA due to having received only 13 weeks of neoadjuvant chemotherapy.  We discussed the results of the CREATE-X study (adjuvant Xeloda in patient w/o CR after nate-adjuvant tx), however, after extensive discussion she ultimately declined additional adjuvant chemotherapy.  She completed radiation on 10/17/16.  She has been on letrozole since early 09/2016.    Interim History: Ms. Osorio has been doing well, though she continues to note fatigue, which has been exacerbated by recent family visits as well as suboptimal sleep habits.  While she has arthralgias of her knees and shoulder, these are unchanged over recent interval.  Pains are worse after activity.  Knee pain is especially bothersome with climbing stairs.  She denies requiring pain medication.  Appetite and weight are stable, and neuropathic symptoms of her fingers and toes are unchanged.  Occasionally tearful during interviewing, but denies depressed mood/anhedonia. She denies new bone or joint aches or pains.  She has no cough, shortness of breath, or chest pain.  She denies lymphedema or swelling of the extremities.  She has no headaches, visual changes, or focal neurologic complaints.  The remainder of a 10 point review of systems is otherwise negative.      PAST MEDICAL HISTORY:   Past Medical History:   Diagnosis Date     [facility]  [facility]     Breast cancer (H)      CHR BLOOD LOSS ANEMIA DUE TO FIBROIDS 6/9/2005     ELEVATED HBA1C 6.2% 6/05  8/6/2005     HX MUCOUS POLYPS OF CERVIX  2000     HYPERLIPIDEMIA       Infectious mononucleosis 5/01     MENORRHAGIA      MIGRAINE HEADACHES      MORBID OBESITY BMI 44.79 6/05 6/12/2005     UTERINE LEIOMYOMA  2/7/2003       PAST SURGICAL HISTORY:  Past Surgical History:   Procedure Laterality Date     CL AFF SURGICAL PATHOLOGY  2005    UTERINE LEIOMYOMA  [D25.9]     HC BIOPSY/EXCIS CERVICAL LESION W/WO FULGURATION  08-15-00,12-    endocervical polypectomy     HC TOOTH EXTRACTION W/FORCEP      wisdom teeth     LUMPECTOMY BREAST WITH SENTINEL NODE, COMBINED Left 6/29/2016    Segmental mastectomy with axillary lymph node dissection     REMOVE PORT VASCULAR ACCESS Right 6/29/2016    Procedure: REMOVE PORT VASCULAR ACCESS;  Surgeon: Gianna Amezcua MD;  Location:  OR     MEDS:  Current Outpatient Prescriptions   Medication     letrozole (FEMARA) 2.5 MG tablet     levothyroxine (SYNTHROID/LEVOTHROID) 50 MCG tablet     order for DME     No current facility-administered medications for this visit.      Facility-Administered Medications Ordered in Other Visits   Medication     lidocaine BUFFERED 1 % solution 10 mL       ALLERGIES:  Allergies   Allergen Reactions     Benadryl [Diphenhydramine] Other (See Comments)     Pt had severe hypotension, nausea and vasovagal type reaction to IV Benadryl.   Give PO only.      Cats      Keflex [Cephalexin]      rash        PHYSICAL EXAM:  /83 (BP Location: Right arm, Patient Position: Sitting, Cuff Size: Adult Regular)  Pulse 86  Temp 98.1  F (36.7  C) (Oral)  Resp 16  Wt 96.2 kg (212 lb)  LMP 10/28/2009  SpO2 97%  BMI 36.33 kg/m2  LMP 10/28/2009  General:  Well appearing, obese adult female in NAD.  HEENT:  Normocephalic.  Sclera anicteric.  MMM.  No lesions of the oropharynx.  Lymph:  No palpable cervical, supraclavicular, or axillary LAD.  Chest:  CTA bilaterally.  No wheezes or crackles.  CV:  RRR.  Nl S1 and S2.  No m/r/g.  Breast:  Bilateral breasts are  of normal fibroglandular density.  There is enhancement of pores and skin thickening of the left breast in the distribution of the radiation field.  There are no discretely palpable masses in either breast.  Bilateral nipples are everted.  No nipple discharge.  Abd:  Soft/NT/ND.  BSs normoactive.  No hepatosplenomegaly.  Ext:  No pitting edema of the bilateral lower extremities.  Pulses 2+ and symmetric.  Musculo:  Strength 5/5 throughout.  No notable lymphedema of the LUE.  Tenderness with extension of the LUE.  Neuro:  Cranial nerves grossly intact.  Psych:  Mood and affect appear normal.    LABS REVIEWED THIS VISIT:  2/6/17 Bilateral diagnostic mammogram:  FINDINGS:  No suspicious findings in either breast. There is a benign  appearing postsurgical scar in the left central breast with no  suspicious findings.     There is skin thickening in the left breast consistent with  postsurgical or postradiation changes.                                                                       IMPRESSION: BI-RADS CATEGORY: 2 - Benign Finding(s)    Results for BURTON MONTELONGO (MRN [id number]) as of 8/16/2017 16:32   Ref. Range 8/7/2017 15:00   Sodium Latest Ref Range: 133 - 144 mmol/L 139   Potassium Latest Ref Range: 3.4 - 5.3 mmol/L 3.9   Chloride Latest Ref Range: 94 - 109 mmol/L 104   Carbon Dioxide Latest Ref Range: 20 - 32 mmol/L 26   Urea Nitrogen Latest Ref Range: 7 - 30 mg/dL 16   Creatinine Latest Ref Range: 0.52 - 1.04 mg/dL 0.67   GFR Estimate Latest Ref Range: >60 mL/min/1.7m2 90   GFR Estimate If Black Latest Ref Range: >60 mL/min/1.7m2 >90...   Calcium Latest Ref Range: 8.5 - 10.1 mg/dL 9.2   Anion Gap Latest Ref Range: 3 - 14 mmol/L 9   Albumin Latest Ref Range: 3.4 - 5.0 g/dL 3.7   Protein Total Latest Ref Range: 6.8 - 8.8 g/dL 7.3   Bilirubin Total Latest Ref Range: 0.2 - 1.3 mg/dL 0.3   Alkaline Phosphatase Latest Ref Range: 40 - 150 U/L 88   ALT Latest Ref Range: 0 - 50 U/L 30   AST Latest Ref Range: 0 - 45  U/L 17     Results for REMEDIOS OSORIO (MRN [id number]) as of 8/16/2017 16:32   Ref. Range 8/7/2017 15:00   WBC Latest Ref Range: 4.0 - 11.0 10e9/L 4.2   Hemoglobin Latest Ref Range: 11.7 - 15.7 g/dL 12.4   Hematocrit Latest Ref Range: 35.0 - 47.0 % 38.1   Platelet Count Latest Ref Range: 150 - 450 10e9/L 230   Results for REMEDIOS OSORIO (MRN [id number]) as of 8/16/2017 16:32   Ref. Range 8/7/2017 15:00   T4 Free Latest Ref Range: 0.76 - 1.46 ng/dL 0.81   TSH Latest Ref Range: 0.40 - 4.00 mU/L 4.37 (H)         IMPRESSION/PLAN: Remedios Osorio is a 61 year old female with history of obesity, prediabetes and iron deficiency anemia with a stage IIIa, U0tD9P8, ER/IA positive, HER2 negative left sided breast cancer. She is s/p 11 weeks of neoadjuvant weekly taxol and one cycle of ddAC. She declined further chemotherapy due to side effects.  Left breast lumpectomy and ALND on 6/30/16 showed residual 1.6 cm IDC in the left breast and 6/19 lymph nodes involved with malignancy.  She declined adjuvant Xeloda.  She received adjuvant radiation and has been on letrozole since early September, 2016.      #  Stage III breast cancer:  Remedios is 1 year 1 month out from excision of her left sided breast cancer.  She has been adjuvant letrozole for 11 months.  She reports symptoms of fatigue and arthralgias.  Despite these side effects, she is willing to continue the medication.  We plan to continue letrozole to complete a total of 10 years of endocrine therapy (through 09/2026).  There is no evidence of disease recurrence on clinical breast exam performed today.  She will return to clinic in 3 months for her next exam.  We will continue annual screening mammogram in February of each year.  Remedios has been seen in survivor clinic and has received a treatment summary.    I again encouraged her to resume a diet low in saturated fat and refined sugar and to resume daily walking.    # Fatigue:  Secondary to breast cancer therapy.  She is s/p  cancer rehab.  We reviewed the importance of resuming healthy eating habits and daily exercise.    #  Subclinical hypothyroidism:  Elevated TSH with normal free T4.  Numbers are improved since last visit and after stopping iodine supplements.  Encouraged to continue to refrain from exogenous iodine.      #  Neuropathy:  Continue deep massage to the fingers and toes daily.    #  Bone Health:  DEXA bone density scan performed in 10/2016 showed a lowest T-score of -0.5.  She is at increased risk of bone loss on aromatase inhibitor therapy.  She will continue calcium 1200 mg and vitamin D 1000 IU PO daily.      #  Follow Up:  Return to clinic in 3 months for labs and visit with me.    It was a pleasure to see Remedios in clinic today.  A total of 25 minutes of our 30 minute face to face visit was spent in counseling.

## 2017-08-07 NOTE — LETTER
8/7/2017       RE: Remedios Osorio  [address] [address]     Dear Colleague,    Thank you for referring your patient, eRmedios Osorio, to the [facility]. Please see a copy of my visit note below.    MEDICAL ONCOLOGY FOLLOW-UP PATIENT VISIT     NAME: Remedios Osorio     DATE: 8/7/2017    PRIMARY CARE PHYSICIAN: Kae Caal    PATIENT ID: Clinical Stage II-B Breast Cancer    Oncology History: Remedios Osorio is a 61 year old female with history of obesity, prediabetes and iron deficiency anemia with stage IIIa, B4lO5bM8, ER positive, IA positive, HER2 non-amplified invasive ductal carcinoma of the left breast. Her PCP palpated a breast mass in the lower outer left breast in 01/2016. Diagnostic mammogram and ultrasound showed a hyperdense mass at 4:30, 8 cm from the nipple, measuring 3.3 cm on mammogram and 2.1 cm on ultrasound. She was also found to have multiple abnormal left axillary lymph nodes. The largest one was 2.4 cm. Core needle biopsy of her left breast mass demonstrated invasive ductal carcinoma, grade 3 with extensive tumor necrosis. Axillary LN biopsy was also positive for malignancy, consistent with breast primary. The tumor cells are strongly positive for estrogen receptor (> 95%) and are moderate to strongly positive for progesterone receptor (60-70%). HER2/lizz was non-amplified by FISH.  PET/CT showed the left breast mass, 5 hypermetabolic left axillary lymph nodes, indeterminate left cervical chain lymph nodes, and an indeterminate hypodensity in the dome of the liver, but was negative for distant metastasis. She declined enrollment in the I-SPY2 clinical trial and agreed to proceed with neoadjuvant chemotherapy, she received 11 cycles of weekly Taxol, the 12th was cancelled due to neuropathy. She received one cycle of ddAC but did not tolerate this well due to fatigue and generalized weakness. She declined any further chemotherapy.    Left breast  lumpectomy and left axillary lymph node dissection was performed by Dr. Amezcua on 6/30/16.  Pathology showed a residual 1.6 cm grade 2, IDC in the left breast.  Invasive surgical margin was negative by 3 mm.  There was moderate intratumoral fibrosis, with final tumor cellularity of 30%.  Tumor infiltrating lymphocytes were estimated at 50%.  6/19 excised lymph nodes were involved with malignancy.  The largest lymph node metastasis measured 3.9 cm and had a 1 mm area of extranodal extension.  Minimal treatment related changes were noted in the lymph nodes.  Pathologic staging was [id number], or stage IIIa.  [facility] residual cancer burden was Class III.  I met with Remedios in clinic on 7/11/16.  Unfortunately she was not eligible for ARIADNA due to having received only 13 weeks of neoadjuvant chemotherapy.  We discussed the results of the CREATE-X study (adjuvant Xeloda in patient w/o CR after nate-adjuvant tx), however, after extensive discussion she ultimately declined additional adjuvant chemotherapy.  She completed radiation on 10/17/16.  She has been on letrozole since early 09/2016.    Interim History: Ms. Osorio has been doing well, though she continues to note fatigue, which has been exacerbated by recent family visits as well as suboptimal sleep habits.  While she has arthralgias of her knees and shoulder, these are unchanged over recent interval.  Pains are worse after activity.  Knee pain is especially bothersome with climbing stairs.  She denies requiring pain medication.  Appetite and weight are stable, and neuropathic symptoms of her fingers and toes are unchanged.  Occasionally tearful during interviewing, but denies depressed mood/anhedonia. She denies new bone or joint aches or pains.  She has no cough, shortness of breath, or chest pain.  She denies lymphedema or swelling of the extremities.  She has no headaches, visual changes, or focal neurologic complaints.  The remainder of a 10 point review of  systems is otherwise negative.      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       PAST SURGICAL HISTORY:  Past Surgical History:   Procedure Laterality Date     CL AFF SURGICAL PATHOLOGY  2005    UTERINE LEIOMYOMA  [D25.9]     HC BIOPSY/EXCIS CERVICAL LESION W/WO FULGURATION  08-15-00,12-    endocervical polypectomy     HC TOOTH EXTRACTION W/FORCEP      wisdom teeth     LUMPECTOMY BREAST WITH SENTINEL NODE, COMBINED Left 6/29/2016    Segmental mastectomy with axillary lymph node dissection     REMOVE PORT VASCULAR ACCESS Right 6/29/2016    Procedure: REMOVE PORT VASCULAR ACCESS;  Surgeon: Gianna Amezcua MD;  Location:  OR     MEDS:  Current Outpatient Prescriptions   Medication     letrozole (FEMARA) 2.5 MG tablet     levothyroxine (SYNTHROID/LEVOTHROID) 50 MCG tablet     order for DME     No current facility-administered medications for this visit.      Facility-Administered Medications Ordered in Other Visits   Medication     lidocaine BUFFERED 1 % solution 10 mL       ALLERGIES:  Allergies   Allergen Reactions     Benadryl [Diphenhydramine] Other (See Comments)     Pt had severe hypotension, nausea and vasovagal type reaction to IV Benadryl.   Give PO only.      Cats      Keflex [Cephalexin]      rash        PHYSICAL EXAM:  /83 (BP Location: Right arm, Patient Position: Sitting, Cuff Size: Adult Regular)  Pulse 86  Temp 98.1  F (36.7  C) (Oral)  Resp 16  Wt 96.2 kg (212 lb)  LMP 10/28/2009  SpO2 97%  BMI 36.33 kg/m2  LMP 10/28/2009  General:  Well appearing, obese adult female in NAD.  HEENT:   Normocephalic.  Sclera anicteric.  MMM.  No lesions of the oropharynx.  Lymph:  No palpable cervical, supraclavicular, or axillary LAD.  Chest:  CTA bilaterally.  No wheezes or crackles.  CV:  RRR.  Nl S1 and S2.  No m/r/g.  Breast:  Bilateral breasts are of normal fibroglandular density.  There is enhancement of pores and skin thickening of the left breast in the distribution of the radiation field.  There are no discretely palpable masses in either breast.  Bilateral nipples are everted.  No nipple discharge.  Abd:  Soft/NT/ND.  BSs normoactive.  No hepatosplenomegaly.  Ext:  No pitting edema of the bilateral lower extremities.  Pulses 2+ and symmetric.  Musculo:  Strength 5/5 throughout.  No notable lymphedema of the LUE.  Tenderness with extension of the LUE.  Neuro:  Cranial nerves grossly intact.  Psych:  Mood and affect appear normal.    LABS REVIEWED THIS VISIT:  2/6/17 Bilateral diagnostic mammogram:  FINDINGS:  No suspicious findings in either breast. There is a benign  appearing postsurgical scar in the left central breast with no  suspicious findings.     There is skin thickening in the left breast consistent with  postsurgical or postradiation changes.                                                                       IMPRESSION: BI-RADS CATEGORY: 2 - Benign Finding(s)    Results for REGINALD BURTON E (MRN [id number]) as of 8/16/2017 16:32   Ref. Range 8/7/2017 15:00   Sodium Latest Ref Range: 133 - 144 mmol/L 139   Potassium Latest Ref Range: 3.4 - 5.3 mmol/L 3.9   Chloride Latest Ref Range: 94 - 109 mmol/L 104   Carbon Dioxide Latest Ref Range: 20 - 32 mmol/L 26   Urea Nitrogen Latest Ref Range: 7 - 30 mg/dL 16   Creatinine Latest Ref Range: 0.52 - 1.04 mg/dL 0.67   GFR Estimate Latest Ref Range: >60 mL/min/1.7m2 90   GFR Estimate If Black Latest Ref Range: >60 mL/min/1.7m2 >90...   Calcium Latest Ref Range: 8.5 - 10.1 mg/dL 9.2   Anion Gap Latest Ref Range: 3 - 14 mmol/L 9   Albumin Latest Ref Range:  3.4 - 5.0 g/dL 3.7   Protein Total Latest Ref Range: 6.8 - 8.8 g/dL 7.3   Bilirubin Total Latest Ref Range: 0.2 - 1.3 mg/dL 0.3   Alkaline Phosphatase Latest Ref Range: 40 - 150 U/L 88   ALT Latest Ref Range: 0 - 50 U/L 30   AST Latest Ref Range: 0 - 45 U/L 17     Results for REMEDIOS OSORIO (MRN [id number]) as of 8/16/2017 16:32   Ref. Range 8/7/2017 15:00   WBC Latest Ref Range: 4.0 - 11.0 10e9/L 4.2   Hemoglobin Latest Ref Range: 11.7 - 15.7 g/dL 12.4   Hematocrit Latest Ref Range: 35.0 - 47.0 % 38.1   Platelet Count Latest Ref Range: 150 - 450 10e9/L 230   Results for REMEDIOS OSORIO (MRN [id number]) as of 8/16/2017 16:32   Ref. Range 8/7/2017 15:00   T4 Free Latest Ref Range: 0.76 - 1.46 ng/dL 0.81   TSH Latest Ref Range: 0.40 - 4.00 mU/L 4.37 (H)         IMPRESSION/PLAN: Remedios Osorio is a 61 year old female with history of obesity, prediabetes and iron deficiency anemia with a stage IIIa, P4aY6N9, ER/MN positive, HER2 negative left sided breast cancer. She is s/p 11 weeks of neoadjuvant weekly taxol and one cycle of ddAC. She declined further chemotherapy due to side effects.  Left breast lumpectomy and ALND on 6/30/16 showed residual 1.6 cm IDC in the left breast and 6/19 lymph nodes involved with malignancy.  She declined adjuvant Xeloda.  She received adjuvant radiation and has been on letrozole since early September, 2016.      #  Stage III breast cancer:  Remedios is 1 year 1 month out from excision of her left sided breast cancer.  She has been adjuvant letrozole for 11 months.  She reports symptoms of fatigue and arthralgias.  Despite these side effects, she is willing to continue the medication.  We plan to continue letrozole to complete a total of 10 years of endocrine therapy (through 09/2026).  There is no evidence of disease recurrence on clinical breast exam performed today.  She will return to clinic in 3 months for her next exam.  We will continue annual screening mammogram in February of each year.   Remedios has been seen in survivor clinic and has received a treatment summary.    I again encouraged her to resume a diet low in saturated fat and refined sugar and to resume daily walking.    # Fatigue:  Secondary to breast cancer therapy.  She is s/p cancer rehab.  We reviewed the importance of resuming healthy eating habits and daily exercise.    #  Subclinical hypothyroidism:  Elevated TSH with normal free T4.  Numbers are improved since last visit and after stopping iodine supplements.  Encouraged to continue to refrain from exogenous iodine.      #  Neuropathy:  Continue deep massage to the fingers and toes daily.    #  Bone Health:  DEXA bone density scan performed in 10/2016 showed a lowest T-score of -0.5.  She is at increased risk of bone loss on aromatase inhibitor therapy.  She will continue calcium 1200 mg and vitamin D 1000 IU PO daily.      #  Follow Up:  Return to clinic in 3 months for labs and visit with me.    It was a pleasure to see Remedios in clinic today.  A total of 25 minutes of our 30 minute face to face visit was spent in counseling.    Again, thank you for allowing me to participate in the care of your patient.      Sincerely,    Kanchan Patel MD

## 2017-08-07 NOTE — MR AVS SNAPSHOT
After Visit Summary   8/7/2017    Remedios Osorio    MRN: [id number]           Patient Information     Date Of Birth          1955        Visit Information        Provider Department      8/7/2017 3:15 PM Kanchan Patel MD [facility]        Today's Diagnoses     Malignant neoplasm of lower-outer quadrant of left breast of female, estrogen receptor positive (H)    -  1    Malignant neoplasm of lower-outer quadrant of left female breast (H)        Hypothyroidism due to non-medication exogenous substances           Follow-ups after your visit        Your next 10 appointments already scheduled     Nov 15, 2017  3:30 PM CST   Return Visit with Niesha Anne MD   Radiation Oncology Clinic ([facility])    [facility]  1st Floor  [address] [id number] [phone]            Nov 20, 2017  2:45 PM CST   [facility] Lab Draw with  [employer] LAB DRAW   [facility] Lab Draw ([facility])    [address] [place] [address] [phone]            Nov 20, 2017  3:15 PM CST   (Arrive by 3:00 PM)   Return Visit with Kanchan Patel MD   [facility] ([facility])    [address] [place] [address] [phone]              Who to contact     If you have questions or need follow up information about today's clinic visit or your schedule please contact [facility] directly at [phone].  Normal or non-critical lab and imaging results will be communicated to you by MyChart, letter or phone within 4 business days after the clinic has received the results. If you do not hear from us within 7 days, please contact the clinic through MyChart or phone. If you have a critical or abnormal lab result, we will notify you by phone as soon as possible.  Submit refill requests  through [employer] or call your pharmacy and they will forward the refill request to us. Please allow 3 business days for your refill to be completed.          Additional Information About Your Visit        [employer] Information     [employer] gives you secure access to your electronic health record. If you see a primary care provider, you can also send messages to your care team and make appointments. If you have questions, please call your primary care clinic.  If you do not have a primary care provider, please call [phone] and they will assist you.        Care EveryWhere ID     This is your Care EveryWhere ID. This could be used by other organizations to access your [place] medical records  [id number]        Your Vitals Were     Pulse Temperature Respirations Last Period Pulse Oximetry BMI (Body Mass Index)    86 98.1  F (36.7  C) (Oral) 16 10/28/2009 97% 36.33 kg/m2       Blood Pressure from Last 3 Encounters:   08/07/17 135/83   05/17/17 121/74   05/08/17 133/79    Weight from Last 3 Encounters:   08/07/17 96.2 kg (212 lb)   05/17/17 95.9 kg (211 lb 6.4 oz)   05/08/17 94.9 kg (209 lb 3.2 oz)              We Performed the Following     CBC with platelets differential     Comprehensive metabolic panel     T4 free     TSH with free T4 reflex        Primary Care Provider    None       No address on file        Equal Access to Services     ISABELA KNIGHT : Hadii aad ku hadasho Soomaali, waaxda luqadaha, qaybta kaalmada adeegyada, paolo madden . So [facility] [phone].    ATENCIÓN: Si habla español, tiene a law disposición servicios gratuitos de asistencia lingüística. Llame al [phone].    We comply with applicable federal civil rights laws and [place] laws. We do not discriminate on the basis of race, color, national origin, age, disability sex, sexual orientation or gender identity.            Thank you!     Thank you for choosing [facility]  for your care. Our goal  is always to provide you with excellent care. Hearing back from our patients is one way we can continue to improve our services. Please take a few minutes to complete the written survey that you may receive in the mail after your visit with us. Thank you!             Your Updated Medication List - Protect others around you: Learn how to safely use, store and throw away your medicines at www.disposemymeds.org.          This list is accurate as of: 8/7/17 11:59 PM.  Always use your most recent med list.                   Brand Name Dispense Instructions for use Diagnosis    letrozole 2.5 MG tablet    FEMARA    90 tablet    Take 1 tablet (2.5 mg) by mouth daily    Malignant neoplasm of lower-outer quadrant of left female breast (H)       levothyroxine 50 MCG tablet    SYNTHROID/LEVOTHROID    90 tablet    Take 1 tablet (50 mcg) by mouth daily    Hypothyroidism due to Hashimoto's thyroiditis       order for DME     1 each    Equipment being ordered: Sit Stand Desk    Malignant neoplasm of female breast, unspecified laterality, unspecified site of breast

## 2017-09-08 ENCOUNTER — HOSPITAL ENCOUNTER (OUTPATIENT)
Dept: OCCUPATIONAL THERAPY | Facility: CLINIC | Age: 62
Setting detail: THERAPIES SERIES
End: 2017-09-08
Attending: INTERNAL MEDICINE
Payer: COMMERCIAL

## 2017-09-08 PROCEDURE — 97535 SELF CARE MNGMENT TRAINING: CPT | Mod: GO | Performed by: OCCUPATIONAL THERAPIST

## 2017-09-08 PROCEDURE — 40000361 ZZHC STATISTIC OT CANCER REHAB VISIT: Performed by: OCCUPATIONAL THERAPIST

## 2017-09-08 NOTE — ADDENDUM NOTE
Encounter addended by: Darío Macedo OT on: 9/8/2017  8:41 AM<BR>     Actions taken: Flowsheet accepted

## 2017-09-08 NOTE — ADDENDUM NOTE
Encounter addended by: Darío Macedo OT on: 9/8/2017 12:04 PM<BR>     Actions taken: Flowsheet accepted

## 2017-09-14 ENCOUNTER — HOSPITAL ENCOUNTER (OUTPATIENT)
Dept: PHYSICAL THERAPY | Facility: CLINIC | Age: 62
Setting detail: THERAPIES SERIES
End: 2017-09-14
Attending: INTERNAL MEDICINE
Payer: COMMERCIAL

## 2017-09-14 PROCEDURE — 97110 THERAPEUTIC EXERCISES: CPT | Mod: GP | Performed by: PHYSICAL THERAPIST

## 2017-09-14 PROCEDURE — 40000360 ZZHC STATISTIC PT CANCER REHAB VISIT: Performed by: PHYSICAL THERAPIST

## 2017-09-14 NOTE — IP AVS SNAPSHOT
MRN:[id number]                      After Visit Summary   9/14/2017    Remedios Osorio    MRN: [id number]           Visit Information        Provider Department      9/14/2017  8:00 AM Carmen Fish, PT [facility] [place], Physical Therapy - Outpatient        Your next 10 appointments already scheduled     Sep 22, 2017  8:00 AM CDT   Cancer Rehab Treatment with Darío Macedo OT   [facility] [place], Occupational Therapy - Outpatient ([facility])    [address]  [place] [address]   [phone]            Sep 27, 2017  8:00 AM CDT   Cancer Rehab Treatment with Carmen Fish, PT   [facility] [place], Physical Therapy - Outpatient ([facility])    [address]  [place] [address]   [phone]            Nov 15, 2017  3:30 PM CST   Return Visit with Niesha Anne MD   Radiation Oncology Clinic ([facility])    [facility]  1st Floor  [address] [address]   [phone]            Nov 20, 2017  2:45 PM CST   [facility] Lab Draw with  [facility] LAB DRAW   [facility])    [address]  2nd Floor  [facility] [id number] [phone]            Nov 20, 2017  3:15 PM CST   (Arrive by 3:00 PM)   Return Visit with Kanchan Patel MD   [facility] ([facility])    [address]  2nd Floor  [facility] [id number] [phone]                Further instructions from your care team       9/14/17    Check off when you do:    Walk each dog separately.   --  --  --  --         Get on bike for 5 (or more) minutes:  (with music)    --  --  --  --    Document what you do above.    Take this back next time.     Do the 1/2/3 exercises we reviewed today.     Carmen  PT  [phone]    MyChart Information     MyChart gives  you secure access to your electronic health record. If you see a primary care provider, you can also send messages to your care team and make appointments. If you have questions, please call your primary care clinic.  If you do not have a primary care provider, please call [phone] and they will assist you.        Care EveryWhere ID     This is your Care EveryWhere ID. This could be used by other organizations to access your [place] medical records  [id number]        Equal Access to Services     ISABELA KNIGHT : Glenis Ramirez, alexandra yoder, radha fregosoalelvis peters, paolo lang. So [facility] [place] [phone].    ATENCIÓN: Si habla candaceañol, tiene a law disposición servicios gratuitos de asistencia lingüística. Llame al [phone].    We comply with applicable federal civil rights laws and [place] laws. We do not discriminate on the basis of race, color, national origin, age, disability sex, sexual orientation or gender identity.

## 2017-09-14 NOTE — DISCHARGE INSTRUCTIONS
9/14/17    Check off when you do:    Walk each dog separately.   --  --  --  --         Get on bike for 5 (or more) minutes:  (with music)    --  --  --  --    Document what you do above.    Take this back next time.     Do the 1/2/3 exercises we reviewed today.     Carmen  PT  [phone]

## 2017-09-22 ENCOUNTER — HOSPITAL ENCOUNTER (OUTPATIENT)
Dept: OCCUPATIONAL THERAPY | Facility: CLINIC | Age: 62
Setting detail: THERAPIES SERIES
End: 2017-09-22
Attending: INTERNAL MEDICINE
Payer: COMMERCIAL

## 2017-09-22 PROCEDURE — 40000361 ZZHC STATISTIC OT CANCER REHAB VISIT: Performed by: OCCUPATIONAL THERAPIST

## 2017-09-22 PROCEDURE — 97535 SELF CARE MNGMENT TRAINING: CPT | Mod: GO | Performed by: OCCUPATIONAL THERAPIST

## 2017-09-22 NOTE — PROGRESS NOTES
"   09/22/17 0700   Signing Clinician's Name / Credentials   Signing clinician's name / credentials Darío Macedo OTR/L, [facility]   Session Number   Session Number Outpatient Occupational Therapy Discharge Summary: 6/6 visits, [facility]   Progress/Recertification   Progress Note Due 09/30/17   OT Goal 1   Goal Identifier Fatigue Management   Goal Description Pt will verbalize increased knowledge of energy management principles & will report at least 2 strategies (personal changes or environmental modifications) that she will incorporate, resulting in decreased fatigue impacting I/ADL performance.   Target Date 09/30/17   Date Met 09/22/17  (timer, to do list, pacing, prioritizing)   OT Goal 2   Goal Identifier Cognition   Goal Description With increased knowledge re: cancer-related fatigue impact on cognitive performance, pt will report that she is using up to 2 new compensatory strategies with reports of improved concentration and recall with complex tasks to return to PLOF with I/ADLS.   Target Date 09/30/17   Date Met 09/22/17  (mapping, grouped to do list; attn strategies, being present)   OT Goal 3   Goal Identifier HEP   Goal Description Pt will demonstrate independence with U/E strengthening & coordination home exercise program (HEP) for optimal I/ADL performance.   Target Date 07/29/17   Date Met 07/25/17  (discontinue goal-PT addressing UE)   Subjective Report   Subjective Report Didn't sleep well last night and so tired this am, but feels that fatigue is better overall and she is \"getting over the hump.\"   System Outcome Measures   FACIT Fatigue Subscale (score out of 52). The higher the score, the better the QOL. 37  (prior score in May 2017 was 20; norm 30-50)   Self Care/Home Management   Minutes 55 Minutes   Skilled Intervention F/u on fatigue with reassessmet-see above. Trained in grouping of to do list and mapping out more complex tasks. REassessment of cognition: feels at 60% with use of strategies " and at eval felt cognitive efficiency less than 50%.   Treatment Detail Experiiential practice to break down to do list pt creates on her computer to aid prioritizing. Used her gardening project as mapping example today to break it into multiple parts to help with initiation, along with training in non-profit organizations that may aid volunteers to assist patient wiht home and garden repair projects.   Progress goals met   Education   Learner Patient   Readiness Acceptance   Method Booklet/handout;Explanation   Response Verbalizes understanding   Plan   Updates to plan of care Pt has made gains in OT with improved QOL, less fatigue, improved pacing skills and return to greater IADL, exercise, along with her work schedule. Is experiencing new thumb region pain avery that she will seek MD appt for to assess. She has met OT goals set and feels confident to use new strategies learned to continue to address cognitive compensation and energy managment.   Plan for next session DC outpatient OT at this time.   Total Session Time   Total Treatment Time (sum of timed and untimed services) 55

## 2017-09-27 ENCOUNTER — HOSPITAL ENCOUNTER (OUTPATIENT)
Dept: PHYSICAL THERAPY | Facility: CLINIC | Age: 62
Setting detail: THERAPIES SERIES
End: 2017-09-27
Attending: INTERNAL MEDICINE
Payer: COMMERCIAL

## 2017-09-27 PROCEDURE — 40000360 ZZHC STATISTIC PT CANCER REHAB VISIT: Performed by: PHYSICAL THERAPIST

## 2017-09-27 PROCEDURE — 97110 THERAPEUTIC EXERCISES: CPT | Mod: GP | Performed by: PHYSICAL THERAPIST

## 2017-09-27 NOTE — PROGRESS NOTES
09/27/17 0800   Signing Clinician's Name / Credentials   Signing clinician's name / credentials Carmen Fish PT   Session Number   Session Number 12   Goal 1   Goal Identifier FACIT/ nearly met - score 37   Goal Description Pt will demonstrate improvement in IADL participation, via subjective ratings of fatigue via FACIT of >38/52.    Target Date 08/17/17   Goal 2   Goal Identifier 6MW - improved but not met   Goal Description Pt will demonstrate improved functional activity tolerance via improved distance achieved on 6MW >1765ft, consistent with gender/age-matched norms.   Target Date 08/17/17   Goal 3   Goal Identifier HEP   Goal Description Pt will demonstrate independence with HEP designed to address functional strength, ROM/flexibility, balance, and conditioning including goal of consistent aerobic activity 30 min/day, 5 days/week.    Target Date 08/17/17   Date Met 09/27/17   Subjective Report   Subjective Report fatigue is getting better but have a migraine today.  i forgot my homeopathic.  my reach on L shoulder is better but = to right side.    Objective Measure 3   Objective Measure 25ft walk   Details 7.4 sec   Objective Measure 4   Objective Measure nustep   Details WL 4 seat 7 arms 8 x  10 min - 924 steps.   Objective Measure 5   Objective Measure 6 min walk test   Details 1344'  (9/14/17)   System Outcome Measures   FACIT Fatigue Subscale (score out of 52). The higher the score, the better the QOL. 37   Therapeutic Procedure/exercise   Minutes 40   Skilled Intervention nustep, HEp   Patient Response joan well   Treatment Detail nustep as above, 25ft walk.  reviewed added to HEP.  vhi handout given and the foll exer reviewed:  supine wand exer x 2 shoulder horiz abd and flexion; bridging, x 10; SLR x 10, sidelying abd hip x 10 bilat.  joan well.  pt likes idea of these supine exer vs ball exer as she does not have a ball.  educ to cont w/ these HEP and walk outside as able.  she agrees   Education    Learner Patient   Readiness Acceptance   Method Explanation;Booklet/handout   Response Verbalizes Understanding;Demonstrates Understanding   Education Comments vhi HEP   Plan   Homework do HEP 5x/wk; walk dogs   Home program see above exer   Updates to plan of care see goals   Plan for next session d/c   Total Session Time   Timed Code Treatment Minutes 40   Total Treatment Time (sum of timed and untimed services) 40

## 2017-09-27 NOTE — PROGRESS NOTES
Outpatient Physical Therapy Discharge Note     Patient: Remedios Osorio  : 1955    Beginning/End Dates of Reporting Period:  17 to 2017    Referring Provider: Dr Patel    Therapy Diagnosis: cancer fatigue     Client Self Report: fatigue is getting better but have a migraine today.  i forgot my homeopathic.  my reach on L shoulder is better but = to right side.     Objective Measurements:              Objective Measure: 25ft walk  Details: 7.4 sec  Objective Measure: nustep  Details: WL 4 seat 7 arms 8 x  10 min - 924 steps.  Objective Measure: 6 min walk test  Details: 1344' (17)     Outcome Measures (most recent score):  FACIT Fatigue Subscale (score out of 52). The higher the score, the better the QOL.: 37      Goals:  Goal Identifier FACIT/ nearly met - score 37   Goal Description Pt will demonstrate improvement in IADL participation, via subjective ratings of fatigue via FACIT of >38/52.    Target Date 17   Date Met      Progress:     Goal Identifier 6MW - improved but not met   Goal Description Pt will demonstrate improved functional activity tolerance via improved distance achieved on 6MW >1765ft, consistent with gender/age-matched norms.   Target Date 17   Date Met      Progress:     Goal Identifier HEP   Goal Description Pt will demonstrate independence with HEP designed to address functional strength, ROM/flexibility, balance, and conditioning including goal of consistent aerobic activity 30 min/day, 5 days/week.    Target Date 17   Date Met  17       Progress Toward Goals:   Progress this reporting period: good/ pt has HEP.       Plan:  Discharge from therapy.    Discharge:  Yes    Reason for Discharge: Patient has a HEP in place.     Equipment Issued: theraband    Discharge Plan: Patient to continue home program.

## 2017-11-15 ENCOUNTER — OFFICE VISIT (OUTPATIENT)
Dept: RADIATION ONCOLOGY | Facility: CLINIC | Age: 62
End: 2017-11-15
Attending: RADIOLOGY
Payer: COMMERCIAL

## 2017-11-15 VITALS — HEART RATE: 84 BPM | SYSTOLIC BLOOD PRESSURE: 132 MMHG | DIASTOLIC BLOOD PRESSURE: 80 MMHG

## 2017-11-15 DIAGNOSIS — C50.512 MALIGNANT NEOPLASM OF LOWER-OUTER QUADRANT OF LEFT BREAST OF FEMALE, ESTROGEN RECEPTOR POSITIVE (H): Primary | ICD-10-CM

## 2017-11-15 DIAGNOSIS — Z17.0 MALIGNANT NEOPLASM OF LOWER-OUTER QUADRANT OF LEFT BREAST OF FEMALE, ESTROGEN RECEPTOR POSITIVE (H): Primary | ICD-10-CM

## 2017-11-15 PROCEDURE — 99212 OFFICE O/P EST SF 10 MIN: CPT | Performed by: RADIOLOGY

## 2017-11-15 NOTE — MR AVS SNAPSHOT
After Visit Summary   11/15/2017    Remedios Osorio    MRN: [id number]           Patient Information     Date Of Birth          1955        Visit Information        Provider Department      11/15/2017 3:30 PM Niesha Anne MD Radiation Oncology Clinic        Today's Diagnoses     Malignant neoplasm of lower-outer quadrant of left breast of female, estrogen receptor positive (H)    -  1       Follow-ups after your visit        Your next 10 appointments already scheduled     Nov 20, 2017  2:45 PM CST   [facility] Lab Draw with  [employer] LAB DRAW   [facility] Lab Draw ([facility])    [address] [place] [address] [phone]            Nov 20, 2017  3:15 PM CST   (Arrive by 3:00 PM)   Return Visit with Kanchan Patel MD   [facility] ([facility])    [address] [place] [id number] [phone]              Future tests that were ordered for you today     Open Future Orders        Priority Expected Expires Ordered    CBC with platelets differential Routine 11/20/2017 11/19/2018 11/19/2017    Comprehensive metabolic panel Routine 11/20/2017 11/19/2018 11/19/2017    MA Screen Bilateral w/Solo Routine  11/19/2018 11/19/2017            Who to contact     Please call your clinic at [phone] to:    Ask questions about your health    Make or cancel appointments    Discuss your medicines    Learn about your test results    Speak to your doctor   If you have compliments or concerns about an experience at your clinic, or if you wish to file a complaint, please contact [facility] Patient Relations at [phone] or email us at [email or web address]         Additional Information About Your Visit        MyChart Information     MyChart gives you secure access to your electronic health record. If you see a primary care provider,  you can also send messages to your care team and make appointments. If you have questions, please call your primary care clinic.  If you do not have a primary care provider, please call [phone] and they will assist you.      [employer] is an electronic gateway that provides easy, online access to your medical records. With [employer], you can request a clinic appointment, read your test results, renew a prescription or communicate with your care team.     To access your existing account, please contact your [facility] or call [phone] for assistance.        Care EveryWhere ID     This is your Care EveryWhere ID. This could be used by other organizations to access your [place] medical records  [id number]        Your Vitals Were     Pulse Last Period                84 10/28/2009           Blood Pressure from Last 3 Encounters:   11/15/17 132/80   08/07/17 135/83   05/17/17 121/74    Weight from Last 3 Encounters:   08/07/17 96.2 kg (212 lb)   05/17/17 95.9 kg (211 lb 6.4 oz)   05/08/17 94.9 kg (209 lb 3.2 oz)              Today, you had the following     No orders found for display       Primary Care Provider    None Specified       No primary provider on file.        Equal Access to Services     ISABELA KNIGHT : Glenis Ramirez, wajosemanuel yoder, qamary kaalmada fran, paolo lang. So [facility] [phone].    ATENCIÓN: Si habla español, tiene a law disposición servicios gratuitos de asistencia lingüística. Llame al [phone].    We comply with applicable federal civil rights laws and [place] laws. We do not discriminate on the basis of race, color, national origin, age, disability, sex, sexual orientation, or gender identity.            Thank you!     Thank you for choosing [facility]  for your care. Our goal is always to provide you with excellent care. Hearing back from our patients is one way we can continue to  improve our services. Please take a few minutes to complete the written survey that you may receive in the mail after your visit with us. Thank you!             Your Updated Medication List - Protect others around you: Learn how to safely use, store and throw away your medicines at www.disposemymeds.org.          This list is accurate as of: 11/15/17 11:59 PM.  Always use your most recent med list.                   Brand Name Dispense Instructions for use Diagnosis    letrozole 2.5 MG tablet    FEMARA    90 tablet    Take 1 tablet (2.5 mg) by mouth daily    Malignant neoplasm of lower-outer quadrant of left female breast (H)       order for DME     1 each    Equipment being ordered: Sit Stand Desk    Malignant neoplasm of female breast, unspecified laterality, unspecified site of breast

## 2017-11-15 NOTE — NURSING NOTE
FOLLOW-UP VISIT    Patient Name: Remedios Osorio      : 1955     Age: 62 year old        ______________________________________________________________________________     Chief Complaint   Patient presents with     Cancer     Radiation oncology follow-up Breast cancer: Left breast 6040 cGy completed on 10/17/2016     /80  Pulse 84  LMP 10/28/2009       Pain  Denies    Labs  Other Labs: No    Imaging  None    Other Appointments:     MD Name:  Appointment Date:    MD Name: Appointment Date:   MD Name: Appointment Date:   Other Appointment Notes:     Residual Radiation side effect:      Additional Instructions:     Nurse face-to-face time: Level 2:  5 min face to face time

## 2017-11-15 NOTE — LETTER
11/15/2017       RE: Remedios Osorio  [address] [facility] [address]     Dear Colleague,    Thank you for referring your patient, Remedios Osorio, to the RADIATION ONCOLOGY CLINIC. Please see a copy of my visit note below.    RADIATION ONCOLOGY FOLLOW-UP VISIT  DATE: Nov 15, 2017    NAME: Remedios Osorio  MRN: [id number]    DISEASE TREATED: Invasive ductal carcinoma of the lower outer quadrant of the left breast, grade 3, ER/WI positive, HER-2 nonamplified, cT2N1, [id number] s/p chemotherapy, lumpectomy and axillary lymph node dissection.     RADIATION THERAPY DELIVERED:   5040 cGy in 28 fractions to the left breast, supraclavicular fossa and axilla followed by 1000 cGy in 5 fractions to the lumpectomy cavity.      INTERVAL SINCE COMPLETION OF RT: 1 year 1 month since completion on 10/17/2016    SUBJECTIVE:  Ms. Osorio is a 62 year old woman who completed radiation therapy for cT2N1, [id number] invasive ductal carcinoma of the left breast, ER+/WI+, HER-2 nonamplified. She initially presented with a mass in her left breast in 01/2016. A mammogram showed a 3.3 cm mass in the lower outer quadrant of her left breast with abnormal lymph nodes on ultrasound. Biopsy of the breast and lymph node were both positive for malignancy. A PET scan showed the breast mass and several positive axillary lymph nodes, and she underwent neoadjuvant Taxol for 11 cycles and 1 cycle of dose-dense AC.  She did not complete all of her chemotherapy due to intolerance.      She then had a lumpectomy and axillary lymph node dissection on 06/29/2016, positive for invasive ductal carcinoma, grade 2, with 1.6 x 1.4 cm residual tumor , ER/WI positive, HER-2 nonamplified with no lymphovascular space invasion, 3 mm margins and 6 of 19 lymph nodes positive, the largest of which was 3.9 cm with extranodal extension. Her radiation therapy was delayed slightly by postoperative infection which resolved with antibiotics. She then completed adjuvant  radiation therapy as above. Following completion of radiation therapy, she was offered treatment with Xeloda. However, the patient declined and was started on letrozole alone in September 2016.      Her mammogram 2/6/2017 was negative for suspicious findings bilaterally. She last saw Dr. Patel on 8/7/2017. At that time she was continuing to have fatigue, neuropathy and joint pain, all related to systemic therapy. They discussed a plan to continue letrozole for 10 years total endocrine therapy. She otherwise had no new symptoms or concerns. She had been seen by lymphedema recently and was discharged from their clinic in September. She now presents to our clinic today for routine follow up appointment to evaluate lymphedema.      Today Ms. Osorio reports doing well overall; her biggest concern is the numbness along the back of her arm residual from surgery. She states it has not changed in nature and is something she is still adjusting to. Otherwise she notes no swelling or decreased range of motion, pain or masses. She was fitted for a sleeve for lymphedema previously but says it no longer fits her, and would like a new one. She denies any other complaints or concerns.    PHYSICAL EXAM:  VITALS: /80  Pulse 84  LMP 10/28/2009  GEN: Appears well, alert, oriented, and in NAD  BREAST: scars well healed; mild hyperpigmentation of treatment field noted. Breast tissue symmetric.   SKIN: Normal color and turgor  EXTREMITY: excellent range of motion, no apparent edema of the left arm  NEURO: No focal deficits, CN 3-12 grossly intact, normal gait  PSYCH: Appropriate mood and affect    LABS AND IMAGING: Reviewed    Lab Results   Component Value Date    WBC 4.2 08/07/2017    HGB 12.4 08/07/2017    HCT 38.1 08/07/2017    MCV 92 08/07/2017     08/07/2017     Lab Results   Component Value Date     08/07/2017    POTASSIUM 3.9 08/07/2017    CHLORIDE 104 08/07/2017    CO2 26 08/07/2017    GLC 96 08/07/2017      IMPRESSION:   Ms. Osorio is a 62 year old female with stage cT2N1, [id number] IDC of the left breast ER+ DE+ [id number] nonamplified s/p neoadjuvant chemotherapy, lumpectomy and axillary lymph node dissection, and adjuvant radiation to the breast, SCV and axilla. She appears well overall today.     PLAN:  1. Continue follow up with Dr. Patel and annual mammograms as scheduled  2. RTC as needed  3. Continued monitoring of lymphedema.  She will call if there are any issues, but otherwise will do exercise at home as instructed by the lymphedema clinic.      Ms. Osorio was seen and discussed with staff, Dr. Anne.    Dary Martinez MD  PGY-2 Radiation Oncology Resident  [facility]  Clinic: [phone]        I saw and examined the patient with the resident.  I have reviewed and agree with the resident's note and plan of care.      Niesha Anne MD

## 2017-11-16 NOTE — PROGRESS NOTES
RADIATION ONCOLOGY FOLLOW-UP VISIT  DATE: Nov 15, 2017    NAME: Remedios Osorio  MRN: [id number]    DISEASE TREATED: Invasive ductal carcinoma of the lower outer quadrant of the left breast, grade 3, ER/MA positive, HER-2 nonamplified, cT2N1, rnS3H0u s/p chemotherapy, lumpectomy and axillary lymph node dissection.     RADIATION THERAPY DELIVERED:  5040 cGy in 28 fractions to the left breast, supraclavicular fossa and axilla followed by 1000 cGy in 5 fractions to the lumpectomy cavity.      INTERVAL SINCE COMPLETION OF RT: 1 year 1 month since completion on 10/17/2016    SUBJECTIVE:  Ms. Osorio is a 62 year old woman who completed radiation therapy for cT2N1, fiY3Q9o invasive ductal carcinoma of the left breast, ER+/MA+, HER-2 nonamplified. She initially presented with a mass in her left breast in 01/2016. A mammogram showed a 3.3 cm mass in the lower outer quadrant of her left breast with abnormal lymph nodes on ultrasound. Biopsy of the breast and lymph node were both positive for malignancy. A PET scan showed the breast mass and several positive axillary lymph nodes, and she underwent neoadjuvant Taxol for 11 cycles and 1 cycle of dose-dense AC.  She did not complete all of her chemotherapy due to intolerance.      She then had a lumpectomy and axillary lymph node dissection on 06/29/2016, positive for invasive ductal carcinoma, grade 2, with 1.6 x 1.4 cm residual tumor , ER/MA positive, HER-2 nonamplified with no lymphovascular space invasion, 3 mm margins and 6 of 19 lymph nodes positive, the largest of which was 3.9 cm with extranodal extension. Her radiation therapy was delayed slightly by postoperative infection which resolved with antibiotics. She then completed adjuvant radiation therapy as above. Following completion of radiation therapy, she was offered treatment with Xeloda. However, the patient declined and was started on letrozole alone in September 2016.      Her mammogram 2/6/2017 was negative for  suspicious findings bilaterally. She last saw Dr. Patel on 8/7/2017. At that time she was continuing to have fatigue, neuropathy and joint pain, all related to systemic therapy. They discussed a plan to continue letrozole for 10 years total endocrine therapy. She otherwise had no new symptoms or concerns. She had been seen by lymphedema recently and was discharged from their clinic in September. She now presents to our clinic today for routine follow up appointment to evaluate lymphedema.      Today Ms. Osorio reports doing well overall; her biggest concern is the numbness along the back of her arm residual from surgery. She states it has not changed in nature and is something she is still adjusting to. Otherwise she notes no swelling or decreased range of motion, pain or masses. She was fitted for a sleeve for lymphedema previously but says it no longer fits her, and would like a new one. She denies any other complaints or concerns.    PHYSICAL EXAM:  VITALS: /80  Pulse 84  LMP 10/28/2009  GEN: Appears well, alert, oriented, and in NAD  BREAST: scars well healed; mild hyperpigmentation of treatment field noted. Breast tissue symmetric.   SKIN: Normal color and turgor  EXTREMITY: excellent range of motion, no apparent edema of the left arm  NEURO: No focal deficits, CN 3-12 grossly intact, normal gait  PSYCH: Appropriate mood and affect    LABS AND IMAGING: Reviewed    Lab Results   Component Value Date    WBC 4.2 08/07/2017    HGB 12.4 08/07/2017    HCT 38.1 08/07/2017    MCV 92 08/07/2017     08/07/2017     Lab Results   Component Value Date     08/07/2017    POTASSIUM 3.9 08/07/2017    CHLORIDE 104 08/07/2017    CO2 26 08/07/2017    GLC 96 08/07/2017     IMPRESSION:   Ms. Osorio is a 62 year old female with stage cT2N1, coP4M0c IDC of the left breast ER+ IN+ [id number] nonamplified s/p neoadjuvant chemotherapy, lumpectomy and axillary lymph node dissection, and adjuvant radiation to the  breast, SCV and axilla. She appears well overall today.     PLAN:  1. Continue follow up with Dr. Patel and annual mammograms as scheduled  2. RTC as needed  3. Continued monitoring of lymphedema.  She will call if there are any issues, but otherwise will do exercise at home as instructed by the lymphedema clinic.      Ms. Osorio was seen and discussed with staff, Dr. Anne.    Dary Martinez MD  PGY-2 Radiation Oncology Resident  [facility]  Clinic: [phone]        I saw and examined the patient with the resident.  I have reviewed and agree with the resident's note and plan of care.      Niesha Anne MD

## 2017-11-20 ENCOUNTER — APPOINTMENT (OUTPATIENT)
Dept: LAB | Facility: CLINIC | Age: 62
End: 2017-11-20
Attending: INTERNAL MEDICINE
Payer: COMMERCIAL

## 2017-11-20 ENCOUNTER — ONCOLOGY VISIT (OUTPATIENT)
Dept: ONCOLOGY | Facility: CLINIC | Age: 62
End: 2017-11-20
Attending: INTERNAL MEDICINE
Payer: COMMERCIAL

## 2017-11-20 VITALS
TEMPERATURE: 98.7 F | OXYGEN SATURATION: 95 % | WEIGHT: 214.7 LBS | SYSTOLIC BLOOD PRESSURE: 125 MMHG | DIASTOLIC BLOOD PRESSURE: 77 MMHG | HEART RATE: 105 BPM | BODY MASS INDEX: 36.79 KG/M2

## 2017-11-20 DIAGNOSIS — Z12.31 ENCOUNTER FOR SCREENING MAMMOGRAM FOR BREAST CANCER: ICD-10-CM

## 2017-11-20 DIAGNOSIS — I89.0 LYMPHEDEMA OF LEFT ARM: ICD-10-CM

## 2017-11-20 DIAGNOSIS — Z17.0 MALIGNANT NEOPLASM OF LOWER-OUTER QUADRANT OF LEFT BREAST OF FEMALE, ESTROGEN RECEPTOR POSITIVE (H): Primary | ICD-10-CM

## 2017-11-20 DIAGNOSIS — C50.512 MALIGNANT NEOPLASM OF LOWER-OUTER QUADRANT OF LEFT BREAST OF FEMALE, ESTROGEN RECEPTOR POSITIVE (H): Primary | ICD-10-CM

## 2017-11-20 DIAGNOSIS — Z92.3 HISTORY OF THYROID IRRADIATION: ICD-10-CM

## 2017-11-20 LAB
ALBUMIN SERPL-MCNC: 3.7 G/DL (ref 3.4–5)
ALP SERPL-CCNC: 87 U/L (ref 40–150)
ALT SERPL W P-5'-P-CCNC: 26 U/L (ref 0–50)
ANION GAP SERPL CALCULATED.3IONS-SCNC: 8 MMOL/L (ref 3–14)
AST SERPL W P-5'-P-CCNC: 19 U/L (ref 0–45)
BASOPHILS # BLD AUTO: 0 10E9/L (ref 0–0.2)
BASOPHILS NFR BLD AUTO: 0.6 %
BILIRUB SERPL-MCNC: 0.3 MG/DL (ref 0.2–1.3)
BUN SERPL-MCNC: 14 MG/DL (ref 7–30)
CALCIUM SERPL-MCNC: 9.5 MG/DL (ref 8.5–10.1)
CHLORIDE SERPL-SCNC: 106 MMOL/L (ref 94–109)
CO2 SERPL-SCNC: 25 MMOL/L (ref 20–32)
CREAT SERPL-MCNC: 0.84 MG/DL (ref 0.52–1.04)
DIFFERENTIAL METHOD BLD: NORMAL
EOSINOPHIL # BLD AUTO: 0.1 10E9/L (ref 0–0.7)
EOSINOPHIL NFR BLD AUTO: 1.5 %
ERYTHROCYTE [DISTWIDTH] IN BLOOD BY AUTOMATED COUNT: 14.2 % (ref 10–15)
GFR SERPL CREATININE-BSD FRML MDRD: 69 ML/MIN/1.7M2
GLUCOSE SERPL-MCNC: 98 MG/DL (ref 70–99)
HCT VFR BLD AUTO: 38.3 % (ref 35–47)
HGB BLD-MCNC: 12.7 G/DL (ref 11.7–15.7)
IMM GRANULOCYTES # BLD: 0 10E9/L (ref 0–0.4)
IMM GRANULOCYTES NFR BLD: 0.5 %
LYMPHOCYTES # BLD AUTO: 1 10E9/L (ref 0.8–5.3)
LYMPHOCYTES NFR BLD AUTO: 15.3 %
MCH RBC QN AUTO: 30.2 PG (ref 26.5–33)
MCHC RBC AUTO-ENTMCNC: 33.2 G/DL (ref 31.5–36.5)
MCV RBC AUTO: 91 FL (ref 78–100)
MONOCYTES # BLD AUTO: 0.4 10E9/L (ref 0–1.3)
MONOCYTES NFR BLD AUTO: 6.8 %
NEUTROPHILS # BLD AUTO: 4.9 10E9/L (ref 1.6–8.3)
NEUTROPHILS NFR BLD AUTO: 75.3 %
NRBC # BLD AUTO: 0 10*3/UL
NRBC BLD AUTO-RTO: 0 /100
PLATELET # BLD AUTO: 246 10E9/L (ref 150–450)
POTASSIUM SERPL-SCNC: 4 MMOL/L (ref 3.4–5.3)
PROT SERPL-MCNC: 6.9 G/DL (ref 6.8–8.8)
RBC # BLD AUTO: 4.2 10E12/L (ref 3.8–5.2)
SODIUM SERPL-SCNC: 139 MMOL/L (ref 133–144)
T4 FREE SERPL-MCNC: 0.86 NG/DL (ref 0.76–1.46)
TSH SERPL DL<=0.005 MIU/L-ACNC: 4.67 MU/L (ref 0.4–4)
WBC # BLD AUTO: 6.5 10E9/L (ref 4–11)

## 2017-11-20 PROCEDURE — 84443 ASSAY THYROID STIM HORMONE: CPT | Performed by: INTERNAL MEDICINE

## 2017-11-20 PROCEDURE — 85025 COMPLETE CBC W/AUTO DIFF WBC: CPT | Performed by: INTERNAL MEDICINE

## 2017-11-20 PROCEDURE — 84439 ASSAY OF FREE THYROXINE: CPT | Performed by: INTERNAL MEDICINE

## 2017-11-20 PROCEDURE — 80053 COMPREHEN METABOLIC PANEL: CPT | Performed by: INTERNAL MEDICINE

## 2017-11-20 PROCEDURE — 99214 OFFICE O/P EST MOD 30 MIN: CPT | Mod: ZP | Performed by: INTERNAL MEDICINE

## 2017-11-20 PROCEDURE — 99212 OFFICE O/P EST SF 10 MIN: CPT | Mod: ZF

## 2017-11-20 ASSESSMENT — PAIN SCALES - GENERAL: PAINLEVEL: NO PAIN (0)

## 2017-11-20 NOTE — MR AVS SNAPSHOT
"              After Visit Summary   11/20/2017    Remedios Osorio    MRN: [id number]           Patient Information     Date Of Birth          1955        Visit Information        Provider Department      11/20/2017 3:15 PM Kanchan Patel MD [facility]        Today's Diagnoses     Malignant neoplasm of lower-outer quadrant of left breast of female, estrogen receptor positive (H)    -  1    Encounter for screening mammogram for breast cancer        History of thyroid irradiation        Lymphedema of left arm           Follow-ups after your visit        Your next 10 appointments already scheduled     Feb 26, 2018  4:00 PM CST   (Arrive by 3:45 PM)   MA SCREENING BILATERAL W/ GABRIEL with [id number]   [facility] ([facility])    [address] [place] [id number] [phone]           Three-dimensional (3D) mammograms are available at [facility] [place] locations in [place], [place], [place], [place], [facility], [facility], and [place]. [facility] locations include [place] and [facility] in [place]. Benefits of 3D mammograms include: - Improved rate of cancer detection - Decreases your chance of having to go back for more tests, which means fewer: - \"False-positive\" results (This means that there is an abnormal area but it isn't cancer.) - Invasive testing procedures, such as a biopsy or surgery - Can provide clearer images of the breast if you have dense breast tissue. 3D mammography is an optional exam that anyone can have with a 2D mammogram. It doesn't replace or take the place of a 2D mammogram. 2D mammograms remain an effective screening test for all women.  Not all insurance companies cover the cost of a 3D mammogram. Check with your insurance.            Feb 26, 2018  4:15 PM CST   [facility] Lab Draw with  testbirds LAB DRAW   [facility] ([facility]) "    [address] [id number] [phone]            Feb 26, 2018  4:45 PM CST   (Arrive by 4:30 PM)   Return Visit with Kanchan Patel MD   [facility] ([facility])    [address] [id number] [phone]              Future tests that were ordered for you today     Open Future Orders        Priority Expected Expires Ordered    CBC with platelets differential Routine 2/21/2018 11/20/2018 11/20/2017    Comprehensive metabolic panel Routine 2/21/2018 11/20/2018 11/20/2017    MA Screen Bilateral w/Solo Routine  11/19/2018 11/19/2017            Who to contact     If you have questions or need follow up information about today's clinic visit or your schedule please contact [facility] directly at [phone].  Normal or non-critical lab and imaging results will be communicated to you by MyChart, letter or phone within 4 business days after the clinic has received the results. If you do not hear from us within 7 days, please contact the clinic through Kryptiqt or phone. If you have a critical or abnormal lab result, we will notify you by phone as soon as possible.  Submit refill requests through M-Audio or call your pharmacy and they will forward the refill request to us. Please allow 3 business days for your refill to be completed.          Additional Information About Your Visit        Zenaminshart Information     M-Audio gives you secure access to your electronic health record. If you see a primary care provider, you can also send messages to your care team and make appointments. If you have questions, please call your primary care clinic.  If you do not have a primary care provider, please call [phone] and they will assist you.        Care EveryWhere ID     This is your Care EveryWhere ID. This could be used by other organizations to access your [facility]  records  [id number]        Your Vitals Were     Pulse Temperature Last Period Pulse Oximetry BMI (Body Mass Index)       105 98.7  F (37.1  C) (Oral) 10/28/2009 95% 36.79 kg/m2        Blood Pressure from Last 3 Encounters:   11/20/17 125/77   11/15/17 132/80   08/07/17 135/83    Weight from Last 3 Encounters:   11/20/17 97.4 kg (214 lb 11.2 oz)   08/07/17 96.2 kg (212 lb)   05/17/17 95.9 kg (211 lb 6.4 oz)              We Performed the Following     CBC with platelets differential     Comprehensive metabolic panel     T4 free     TSH with free T4 reflex          Today's Medication Changes          These changes are accurate as of: 11/20/17 11:59 PM.  If you have any questions, ask your nurse or doctor.               These medicines have changed or have updated prescriptions.        Dose/Directions    * order for DME   This may have changed:  Another medication with the same name was added. Make sure you understand how and when to take each.   Used for:  Malignant neoplasm of female breast, unspecified laterality, unspecified site of breast   Changed by:  Kanchan Patel MD        Equipment being ordered: Sit Stand Desk   Quantity:  1 each   Refills:  0       * order for DME   This may have changed:  You were already taking a medication with the same name, and this prescription was added. Make sure you understand how and when to take each.   Used for:  Lymphedema of left arm   Changed by:  Kanchan Patel MD        Equipment being ordered: Compression sleeve   Quantity:  1 Device   Refills:  1       * Notice:  This list has 2 medication(s) that are the same as other medications prescribed for you. Read the directions carefully, and ask your doctor or other care provider to review them with you.         Where to get your medicines      Some of these will need a paper prescription and others can be bought over the counter.  Ask your nurse if you have questions.     Bring a paper prescription  for each of these medications     order for DME                Primary Care Provider    Physician No Ref-Primary       NO REF-PRIMARY PHYSICIAN        Equal Access to Services     ISABELA KNIGHT : Hadii aad ku hadjakiwaldemar Ramirez, rebekada elioclarissaha, radha cruzgerson smithjessa, paolo lacy josetterenetta toledosurekhathien lang. So [facility] [phone].    ATENCIÓN: Si habla español, tiene a law disposición servicios gratuitos de asistencia lingüística. Llame al [phone].    We comply with applicable federal civil rights laws and [place] laws. We do not discriminate on the basis of race, color, national origin, age, disability, sex, sexual orientation, or gender identity.            Thank you!     Thank you for choosing [facility]  for your care. Our goal is always to provide you with excellent care. Hearing back from our patients is one way we can continue to improve our services. Please take a few minutes to complete the written survey that you may receive in the mail after your visit with us. Thank you!             Your Updated Medication List - Protect others around you: Learn how to safely use, store and throw away your medicines at www.disposemymeds.org.          This list is accurate as of: 11/20/17 11:59 PM.  Always use your most recent med list.                   Brand Name Dispense Instructions for use Diagnosis    letrozole 2.5 MG tablet    FEMARA    90 tablet    Take 1 tablet (2.5 mg) by mouth daily    Malignant neoplasm of lower-outer quadrant of left female breast (H)       * order for DME     1 each    Equipment being ordered: Sit Stand Desk    Malignant neoplasm of female breast, unspecified laterality, unspecified site of breast       * order for DME     1 Device    Equipment being ordered: Compression sleeve    Lymphedema of left arm       * Notice:  This list has 2 medication(s) that are the same as other medications prescribed for you. Read the directions carefully, and ask your doctor or  other care provider to review them with you.

## 2017-11-20 NOTE — NURSING NOTE
"Oncology Rooming Note    November 20, 2017 3:26 PM   Remedios Osorio is a 62 year old female who presents for:    Chief Complaint   Patient presents with     Blood Draw     Venipuncture labs collected by RN.     Oncology Clinic Visit     Breast CA 3mo f/u     Initial Vitals: /77 (BP Location: Right arm, Patient Position: Sitting)  Pulse 105  Temp 98.7  F (37.1  C) (Oral)  Wt 97.4 kg (214 lb 11.2 oz)  LMP 10/28/2009  SpO2 95%  BMI 36.79 kg/m2 Estimated body mass index is 36.79 kg/(m^2) as calculated from the following:    Height as of 5/8/17: 1.627 m (5' 4.06\").    Weight as of this encounter: 97.4 kg (214 lb 11.2 oz). Body surface area is 2.1 meters squared.  No Pain (0) Comment: Data Unavailable   Patient's last menstrual period was 10/28/2009.  Allergies reviewed: Yes  Medications reviewed: Yes    Medications: Medication refills not needed today.  Pharmacy name entered into Photofy:    [facility] - [place], [place] - [address].  [id number] - [place], [place] [address]  [facility] [address] [place] [place] [address] [facility] [place] [address] [facility] [place], [place] - [address] [facility] [place], [place] - [address]  [facility] - [place], [place] - [address] [facility] [address]    Clinical concerns: No clinical concerns  Provider was NOT notified.    7 minutes for nursing intake (face to face time)     Thelma March            Pt was offered a flu vaccine today but declined.    "

## 2017-11-20 NOTE — PROGRESS NOTES
MEDICAL ONCOLOGY FOLLOW-UP PATIENT VISIT     NAME: Remedios Osorio     DATE: 11/20/2017    PRIMARY CARE PHYSICIAN: Kae Caal    PATIENT ID: Clinical Stage II-B Breast Cancer    Oncology History: Remedios Osorio is a 62 year old female with stage IIIa, [id number], ER positive, OR positive, HER2 non-amplified invasive ductal carcinoma of the left breast. Her PCP palpated a breast mass in the lower outer left breast in 01/2016. Diagnostic mammogram and ultrasound showed a mass at 4:30, 8 cm from the nipple, measuring 3.3 cm on mammogram and 2.1 cm on ultrasound. She was also found to have multiple abnormal left axillary lymph nodes. The largest one was 2.4 cm. Core needle biopsy of her left breast mass demonstrated invasive ductal carcinoma, grade 3 with extensive tumor necrosis. Axillary LN biopsy was also positive for malignancy. The tumor cells were strongly positive for estrogen receptor (> 95%) and are moderate to strongly positive for progesterone receptor (60-70%). HER2/lizz was non-amplified by FISH.  PET/CT showed the left breast mass, 5 hypermetabolic left axillary lymph nodes, indeterminate left cervical chain lymph nodes, and an indeterminate hypodensity in the dome of the liver. She declined enrollment in the I-SPY2 clinical trial and agreed to proceed with neoadjuvant chemotherapy, she received 11 cycles of weekly Taxol, the 12th was cancelled due to neuropathy. She received one cycle of ddAC but did not tolerate this well due to fatigue and generalized weakness. She declined any further chemotherapy.    Left breast lumpectomy and left axillary lymph node dissection was performed by Dr. Amezcua on 6/29/16.  Pathology showed a residual 1.6 cm grade 2, IDC in the left breast.  Surgical margins were negative.  Invasive tumor cellularity of 30%.  Tumor infiltrating lymphocytes were estimated at 50%.  6/19 excised lymph nodes were involved with malignancy.  The largest lymph node metastasis measured 3.9  cm and had a 1 mm area of extranodal extension.  Minimal treatment related changes were noted in the lymph nodes.  Pathologic staging was [id number], or stage IIIa.  [facility] residual cancer burden was Class III.  Unfortunately she was not eligible for ARIADNA due to having received only 13 weeks of neoadjuvant chemotherapy.  Adjuvant Xeloda was recommended, she declined.  She completed radiation on 10/17/16.  She has been on letrozole since early 09/2016.    Interim History:   Remedios comes in to clinic today for routine breast cancer followup.  She continues on letrozole.  She is tolerating the medication well.  She reports ongoing bilateral knee pain, which she feels is in part due to the medication.  She has hot flashes both day and night and they do wake her from sleep; however, she does not feel they are interfering with functionality.  She also does not feel that they are severe enough to take medication for them.  She denies vaginal dryness or pain.  She has had no recent depression or anxiety.  She completed physical therapy and occupational therapy and has noted significant improvement in her fatigue since doing so, however, after periods of activity she continues to feel fatigued.  She has regained some of the weight that she lost.  She states that she was at 202 pounds at her lowest and is currently at 214.  She was at 270 at her highest.  She gets fatigued after about 2 hours of physical activity, such as when she was doing yard work today.  She admits that she quit exercising since her last visit.  She is motivated to resume it at this time.  She also continues to try to adhere to a diet low in sugar.  She is frustrated that a yogurt she was eating that she thought was sugar free just recently changed its label to say that it actually does have 9 g of sugar.  She has no fevers, chills or infectious complaints.  No cough, shortness of breath or chest pain.  No abdominal complaints including abdominal  pain, nausea, vomiting, diarrhea or constipation.  The remainder of a 10-point review of systems is otherwise negative.     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       PAST SURGICAL HISTORY:  Past Surgical History:   Procedure Laterality Date     CL AFF SURGICAL PATHOLOGY  2005    UTERINE LEIOMYOMA  [D25.9]     HC BIOPSY/EXCIS CERVICAL LESION W/WO FULGURATION  08-15-00,12-    endocervical polypectomy     HC TOOTH EXTRACTION W/FORCEP      wisdom teeth     LUMPECTOMY BREAST WITH SENTINEL NODE, COMBINED Left 6/29/2016    Segmental mastectomy with axillary lymph node dissection     REMOVE PORT VASCULAR ACCESS Right 6/29/2016    Procedure: REMOVE PORT VASCULAR ACCESS;  Surgeon: Gianna Amezcua MD;  Location:  OR     MEDS:  Current Outpatient Prescriptions   Medication     letrozole (FEMARA) 2.5 MG tablet     order for DME     No current facility-administered medications for this visit.      Facility-Administered Medications Ordered in Other Visits   Medication     lidocaine BUFFERED 1 % solution 10 mL       ALLERGIES:  Allergies   Allergen Reactions     Benadryl [Diphenhydramine] Other (See Comments)     Pt had severe hypotension, nausea and vasovagal type reaction to IV Benadryl.   Give PO only.      Cats      Keflex [Cephalexin]      rash        PHYSICAL EXAM:  /77 (BP Location: Right arm, Patient Position: Sitting)  Pulse 105  Temp 98.7  F (37.1  C) (Oral)  Wt 97.4 kg (214 lb 11.2 oz)  LMP 10/28/2009  SpO2 95%  BMI 36.79 kg/m2  LMP 10/28/2009  General:  Well appearing, obese adult female in NAD.  HEENT:   Normocephalic.  Sclera anicteric.  MMM.  No lesions of the oropharynx.  Lymph:  No palpable cervical, supraclavicular, or axillary LAD.  Chest:  CTA bilaterally.  No wheezes or crackles.  CV:  RRR.  Nl S1 and S2.  No m/r/g.  Breast:  Bilateral breasts are of normal fibroglandular density.  There is enhancement of pores and skin thickening of the left breast, as well as mild edema.  There are no discretely palpable masses in either breast.  Bilateral nipples are everted.  No nipple discharge.  Abd:  Soft/NT/ND.  BSs normoactive.  No hepatosplenomegaly.  Ext:  No pitting edema of the bilateral lower extremities.  Pulses 2+ and symmetric.  Musculo:  Strength 5/5 throughout.  No notable lymphedema of the LUE.  Tenderness with extension of the LUE (unchanged from prior exam).  Neuro:  Cranial nerves grossly intact.  Psych:  Mood and affect appear normal.    LABS REVIEWED THIS VISIT:  Results for BURTON MONTELONGO (MRN [id number]) as of 11/20/2017 15:59   Ref. Range 11/20/2017 15:03   WBC Latest Ref Range: 4.0 - 11.0 10e9/L 6.5   Hemoglobin Latest Ref Range: 11.7 - 15.7 g/dL 12.7   Hematocrit Latest Ref Range: 35.0 - 47.0 % 38.3   Platelet Count Latest Ref Range: 150 - 450 10e9/L 246     Results for BURTON MONTELONGO (MRN [id number]) as of 11/21/2017 14:58   Ref. Range 11/20/2017 15:03   Sodium Latest Ref Range: 133 - 144 mmol/L 139   Potassium Latest Ref Range: 3.4 - 5.3 mmol/L 4.0   Chloride Latest Ref Range: 94 - 109 mmol/L 106   Carbon Dioxide Latest Ref Range: 20 - 32 mmol/L 25   Urea Nitrogen Latest Ref Range: 7 - 30 mg/dL 14   Creatinine Latest Ref Range: 0.52 - 1.04 mg/dL 0.84   GFR Estimate Latest Ref Range: >60 mL/min/1.7m2 69   GFR Estimate If Black Latest Ref Range: >60 mL/min/1.7m2 84   Calcium Latest Ref Range: 8.5 - 10.1 mg/dL 9.5   Anion Gap Latest Ref Range: 3 - 14 mmol/L 8   Albumin Latest Ref Range: 3.4 - 5.0 g/dL 3.7   Protein Total Latest Ref Range: 6.8 - 8.8 g/dL 6.9   Bilirubin Total Latest Ref Range: 0.2  - 1.3 mg/dL 0.3   Alkaline Phosphatase Latest Ref Range: 40 - 150 U/L 87   ALT Latest Ref Range: 0 - 50 U/L 26   AST Latest Ref Range: 0 - 45 U/L 19   Results for REMEDIOS OSORIO (MRN [id number]) as of 11/21/2017 14:58   Ref. Range 5/8/2017 15:12 8/7/2017 15:00 11/20/2017 15:03   TSH Latest Ref Range: 0.40 - 4.00 mU/L 6.22 (H) 4.37 (H) 4.67 (H)   Results for REMEDIOS OSORIO (MRN [id number]) as of 11/21/2017 14:58   Ref. Range 5/8/2017 15:12 8/7/2017 15:00 11/20/2017 15:03   T4 Free Latest Ref Range: 0.76 - 1.46 ng/dL 0.76 0.81 0.86       IMPRESSION/PLAN: Remedios Osorio is a 62 year old female with stage IIIa, H3dU1I6, ER/AL positive, HER2 negative left breast cancer. She is s/p 11 weeks of weekly taxol and one cycle of ddAC. She declined further chemotherapy due to side effects.  Left breast lumpectomy and ALND on 6/29/16 showed residual 1.6 cm IDC in the left breast and 6/19 lymph nodes involved with malignancy.  She declined adjuvant Xeloda.  She received adjuvant radiation and has been on letrozole since early September, 2016.      1.  Stage III breast cancer:  Remedios is 1 year 4.5 months out from excision of her left sided breast cancer.  She has been on adjuvant letrozole for 1 year 2 months.  She is tolerating the medication well.  We plan to continue letrozole to complete a total of 10 years of endocrine therapy (through 09/2026).  There is no evidence of disease recurrence on clinical breast exam performed today.  She will return to clinic in 3 months for her next exam.  She will be due for mammograms at the time of her return visit.    I again encouraged her to resume a diet low in saturated fat and refined sugar and to resume daily walking.  I stressed the importance of obtaining at least 150 minutes of exercise a week in preventing recurrence.      2.  Fatigue:  She is s/p cancer rehab with significant improvement in fatigue.  She has some residual fatigue, for which I recommended resuming a half hour of daily  walking.    3.  Subclinical hypothyroidism:  Elevated TSH with normal free T4.  Numbers are improved since stopping iodine supplements.  Encouraged to continue to refrain from exogenous iodine.      4.  Neuropathy:  Advised to stop vitamin B6 at this point as now greater than a year out from completion of chemotherapy.    5.  Bone Health:  DEXA bone density scan performed in 10/2016 showed a lowest T-score of -0.5.  She is at increased risk of bone loss on aromatase inhibitor therapy.  She will continue calcium 1200 mg and vitamin D 1000 IU PO daily.  Will plan to repeat a DEXA in 10/2018.    6.  Lymphedema:  Reports LUE after yard work.  On exam edema is of left breast only.  Pt given prescription for new compression sleeve.  Advised compressive bra and massage of breast towards the trunk.      7.  Follow Up:  Return to clinic in 3 months for labs, bilateral screening mammograms with tomosynthesis, and visit with me.    It was a pleasure to see Remedios in clinic today.  A total of 25 minutes of our 30 minute face to face visit was spent in counseling.

## 2017-11-20 NOTE — NURSING NOTE
Chief Complaint   Patient presents with     Blood Draw     Venipuncture labs collected by RN.

## 2017-11-20 NOTE — LETTER
11/20/2017       RE: Remedios Osorio  [address] [address]     Dear Colleague,    Thank you for referring your patient, Remedios Osorio, to the [facility]. Please see a copy of my visit note below.    MEDICAL ONCOLOGY FOLLOW-UP PATIENT VISIT     NAME: Remedios Osorio     DATE: 11/20/2017    PRIMARY CARE PHYSICIAN: Kae Caal    PATIENT ID: Clinical Stage II-B Breast Cancer    Oncology History: Remedios Osorio is a 62 year old female with stage IIIa, G8aG0vU2, ER positive, SC positive, HER2 non-amplified invasive ductal carcinoma of the left breast. Her PCP palpated a breast mass in the lower outer left breast in 01/2016. Diagnostic mammogram and ultrasound showed a mass at 4:30, 8 cm from the nipple, measuring 3.3 cm on mammogram and 2.1 cm on ultrasound. She was also found to have multiple abnormal left axillary lymph nodes. The largest one was 2.4 cm. Core needle biopsy of her left breast mass demonstrated invasive ductal carcinoma, grade 3 with extensive tumor necrosis. Axillary LN biopsy was also positive for malignancy. The tumor cells were strongly positive for estrogen receptor (> 95%) and are moderate to strongly positive for progesterone receptor (60-70%). HER2/lizz was non-amplified by FISH.  PET/CT showed the left breast mass, 5 hypermetabolic left axillary lymph nodes, indeterminate left cervical chain lymph nodes, and an indeterminate hypodensity in the dome of the liver. She declined enrollment in the I-SPY2 clinical trial and agreed to proceed with neoadjuvant chemotherapy, she received 11 cycles of weekly Taxol, the 12th was cancelled due to neuropathy. She received one cycle of ddAC but did not tolerate this well due to fatigue and generalized weakness. She declined any further chemotherapy.    Left breast lumpectomy and left axillary lymph node dissection was performed by Dr. Amezcua on 6/29/16.  Pathology showed a residual 1.6 cm grade 2, IDC in the  left breast.  Surgical margins were negative.  Invasive tumor cellularity of 30%.  Tumor infiltrating lymphocytes were estimated at 50%.  6/19 excised lymph nodes were involved with malignancy.  The largest lymph node metastasis measured 3.9 cm and had a 1 mm area of extranodal extension.  Minimal treatment related changes were noted in the lymph nodes.  Pathologic staging was G2yV4R4, or stage IIIa.  [facility] residual cancer burden was Class III.  Unfortunately she was not eligible for ARIADNA due to having received only 13 weeks of neoadjuvant chemotherapy.  Adjuvant Xeloda was recommended, she declined.  She completed radiation on 10/17/16.  She has been on letrozole since early 09/2016.    Interim History:   Remedios comes in to clinic today for routine breast cancer followup.  She continues on letrozole.  She is tolerating the medication well.  She reports ongoing bilateral knee pain, which she feels is in part due to the medication.  She has hot flashes both day and night and they do wake her from sleep; however, she does not feel they are interfering with functionality.  She also does not feel that they are severe enough to take medication for them.  She denies vaginal dryness or pain.  She has had no recent depression or anxiety.  She completed physical therapy and occupational therapy and has noted significant improvement in her fatigue since doing so, however, after periods of activity she continues to feel fatigued.  She has regained some of the weight that she lost.  She states that she was at 202 pounds at her lowest and is currently at 214.  She was at 270 at her highest.  She gets fatigued after about 2 hours of physical activity, such as when she was doing yard work today.  She admits that she quit exercising since her last visit.  She is motivated to resume it at this time.  She also continues to try to adhere to a diet low in sugar.  She is frustrated that a yogurt she was eating that she thought  was sugar free just recently changed its label to say that it actually does have 9 g of sugar.  She has no fevers, chills or infectious complaints.  No cough, shortness of breath or chest pain.  No abdominal complaints including abdominal pain, nausea, vomiting, diarrhea or constipation.  The remainder of a 10-point review of systems is otherwise negative.     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       PAST SURGICAL HISTORY:  Past Surgical History:   Procedure Laterality Date     CL AFF SURGICAL PATHOLOGY  2005    UTERINE LEIOMYOMA  [D25.9]     HC BIOPSY/EXCIS CERVICAL LESION W/WO FULGURATION  08-15-00,12-    endocervical polypectomy     HC TOOTH EXTRACTION W/FORCEP      wisdom teeth     LUMPECTOMY BREAST WITH SENTINEL NODE, COMBINED Left 6/29/2016    Segmental mastectomy with axillary lymph node dissection     REMOVE PORT VASCULAR ACCESS Right 6/29/2016    Procedure: REMOVE PORT VASCULAR ACCESS;  Surgeon: Gianna Amezcua MD;  Location:  OR     MEDS:  Current Outpatient Prescriptions   Medication     letrozole (FEMARA) 2.5 MG tablet     order for DME     No current facility-administered medications for this visit.      Facility-Administered Medications Ordered in Other Visits   Medication     lidocaine BUFFERED 1 % solution 10 mL       ALLERGIES:  Allergies   Allergen Reactions     Benadryl [Diphenhydramine] Other (See Comments)     Pt had severe hypotension, nausea and vasovagal type reaction to IV Benadryl.   Give PO only.      Cats      Keflex [Cephalexin]      rash        PHYSICAL EXAM:  /77 (BP Location: Right  arm, Patient Position: Sitting)  Pulse 105  Temp 98.7  F (37.1  C) (Oral)  Wt 97.4 kg (214 lb 11.2 oz)  LMP 10/28/2009  SpO2 95%  BMI 36.79 kg/m2  LMP 10/28/2009  General:  Well appearing, obese adult female in NAD.  HEENT:  Normocephalic.  Sclera anicteric.  MMM.  No lesions of the oropharynx.  Lymph:  No palpable cervical, supraclavicular, or axillary LAD.  Chest:  CTA bilaterally.  No wheezes or crackles.  CV:  RRR.  Nl S1 and S2.  No m/r/g.  Breast:  Bilateral breasts are of normal fibroglandular density.  There is enhancement of pores and skin thickening of the left breast, as well as mild edema.  There are no discretely palpable masses in either breast.  Bilateral nipples are everted.  No nipple discharge.  Abd:  Soft/NT/ND.  BSs normoactive.  No hepatosplenomegaly.  Ext:  No pitting edema of the bilateral lower extremities.  Pulses 2+ and symmetric.  Musculo:  Strength 5/5 throughout.  No notable lymphedema of the LUE.  Tenderness with extension of the LUE (unchanged from prior exam).  Neuro:  Cranial nerves grossly intact.  Psych:  Mood and affect appear normal.    LABS REVIEWED THIS VISIT:  Results for BURTON MONTELONGO (MRN [id number]) as of 11/20/2017 15:59   Ref. Range 11/20/2017 15:03   WBC Latest Ref Range: 4.0 - 11.0 10e9/L 6.5   Hemoglobin Latest Ref Range: 11.7 - 15.7 g/dL 12.7   Hematocrit Latest Ref Range: 35.0 - 47.0 % 38.3   Platelet Count Latest Ref Range: 150 - 450 10e9/L 246     Results for BURTON MONTELONGO (MRN [id number]) as of 11/21/2017 14:58   Ref. Range 11/20/2017 15:03   Sodium Latest Ref Range: 133 - 144 mmol/L 139   Potassium Latest Ref Range: 3.4 - 5.3 mmol/L 4.0   Chloride Latest Ref Range: 94 - 109 mmol/L 106   Carbon Dioxide Latest Ref Range: 20 - 32 mmol/L 25   Urea Nitrogen Latest Ref Range: 7 - 30 mg/dL 14   Creatinine Latest Ref Range: 0.52 - 1.04 mg/dL 0.84   GFR Estimate Latest Ref Range: >60 mL/min/1.7m2 69   GFR Estimate If Black Latest Ref Range: >60 mL/min/1.7m2 84    Calcium Latest Ref Range: 8.5 - 10.1 mg/dL 9.5   Anion Gap Latest Ref Range: 3 - 14 mmol/L 8   Albumin Latest Ref Range: 3.4 - 5.0 g/dL 3.7   Protein Total Latest Ref Range: 6.8 - 8.8 g/dL 6.9   Bilirubin Total Latest Ref Range: 0.2 - 1.3 mg/dL 0.3   Alkaline Phosphatase Latest Ref Range: 40 - 150 U/L 87   ALT Latest Ref Range: 0 - 50 U/L 26   AST Latest Ref Range: 0 - 45 U/L 19   Results for REMEDIOS OSORIO (MRN [id number]) as of 11/21/2017 14:58   Ref. Range 5/8/2017 15:12 8/7/2017 15:00 11/20/2017 15:03   TSH Latest Ref Range: 0.40 - 4.00 mU/L 6.22 (H) 4.37 (H) 4.67 (H)   Results for REMEDIOS OSORIO (MRN [id number]) as of 11/21/2017 14:58   Ref. Range 5/8/2017 15:12 8/7/2017 15:00 11/20/2017 15:03   T4 Free Latest Ref Range: 0.76 - 1.46 ng/dL 0.76 0.81 0.86       IMPRESSION/PLAN: Remedios Osorio is a 62 year old female with stage IIIa, U8xR4X1, ER/CT positive, HER2 negative left breast cancer. She is s/p 11 weeks of weekly taxol and one cycle of ddAC. She declined further chemotherapy due to side effects.  Left breast lumpectomy and ALND on 6/29/16 showed residual 1.6 cm IDC in the left breast and 6/19 lymph nodes involved with malignancy.  She declined adjuvant Xeloda.  She received adjuvant radiation and has been on letrozole since early September, 2016.      1.  Stage III breast cancer:  Remedios is 1 year 4.5 months out from excision of her left sided breast cancer.  She has been on adjuvant letrozole for 1 year 2 months.  She is tolerating the medication well.  We plan to continue letrozole to complete a total of 10 years of endocrine therapy (through 09/2026).  There is no evidence of disease recurrence on clinical breast exam performed today.  She will return to clinic in 3 months for her next exam.  She will be due for mammograms at the time of her return visit.    I again encouraged her to resume a diet low in saturated fat and refined sugar and to resume daily walking.  I stressed the importance of obtaining  at least 150 minutes of exercise a week in preventing recurrence.      2.  Fatigue:  She is s/p cancer rehab with significant improvement in fatigue.  She has some residual fatigue, for which I recommended resuming a half hour of daily walking.    3.  Subclinical hypothyroidism:  Elevated TSH with normal free T4.  Numbers are improved since stopping iodine supplements.  Encouraged to continue to refrain from exogenous iodine.      4.  Neuropathy:  Advised to stop vitamin B6 at this point as now greater than a year out from completion of chemotherapy.    5.  Bone Health:  DEXA bone density scan performed in 10/2016 showed a lowest T-score of -0.5.  She is at increased risk of bone loss on aromatase inhibitor therapy.  She will continue calcium 1200 mg and vitamin D 1000 IU PO daily.  Will plan to repeat a DEXA in 10/2018.    6.  Lymphedema:  Reports LUE after yard work.  On exam edema is of left breast only.  Pt given prescription for new compression sleeve.  Advised compressive bra and massage of breast towards the trunk.      7.  Follow Up:  Return to clinic in 3 months for labs, bilateral screening mammograms with tomosynthesis, and visit with me.    It was a pleasure to see Remedios in clinic today.  A total of 25 minutes of our 30 minute face to face visit was spent in counseling.    Again, thank you for allowing me to participate in the care of your patient.      Sincerely,    Kanchan Patel MD

## 2017-12-04 DIAGNOSIS — I89.0 LYMPHEDEMA: Primary | ICD-10-CM

## 2017-12-08 ENCOUNTER — OFFICE VISIT (OUTPATIENT)
Dept: FAMILY MEDICINE | Facility: CLINIC | Age: 62
End: 2017-12-08
Payer: COMMERCIAL

## 2017-12-08 VITALS
OXYGEN SATURATION: 97 % | BODY MASS INDEX: 36.02 KG/M2 | SYSTOLIC BLOOD PRESSURE: 136 MMHG | DIASTOLIC BLOOD PRESSURE: 83 MMHG | WEIGHT: 211 LBS | TEMPERATURE: 98.9 F | HEIGHT: 64 IN | HEART RATE: 85 BPM

## 2017-12-08 DIAGNOSIS — L03.011 CELLULITIS OF FINGER OF RIGHT HAND: Primary | ICD-10-CM

## 2017-12-08 PROCEDURE — 99213 OFFICE O/P EST LOW 20 MIN: CPT | Performed by: PHYSICIAN ASSISTANT

## 2017-12-08 RX ORDER — SULFAMETHOXAZOLE/TRIMETHOPRIM 800-160 MG
1 TABLET ORAL 2 TIMES DAILY
Qty: 20 TABLET | Refills: 0 | Status: SHIPPED | OUTPATIENT
Start: 2017-12-08 | End: 2017-12-18

## 2017-12-08 NOTE — LETTER
[facility]  [address] [place] [address]  Phone: [phone]    December 8, 2017        Remedios GRECO Tysonnick  [address] [place] [id number]          To whom it may concern:    RE: Remedios Osorio    Patient was seen and treated today at our clinic and missed work.    Please contact me for questions or concerns.      Sincerely,          Dee Dee Jacobs PA-C

## 2017-12-08 NOTE — PROGRESS NOTES
SUBJECTIVE:   Remedios Osorio is a 62 year old female who presents to clinic today for the following health issues:    Finger:   -Patient reports right middle finger infected x3 days.   -Finger is red, swollen and tender to touch.  -Has been soaking finger and putting bacitracin on it.   -Was given antibiotics, started last night.  Has had 2 doses of Augmentin  Noticing red streaks up her arm  Denies fevers/chills       Problem list and histories reviewed & adjusted, as indicated.  Additional history: as documented    Patient Active Problem List   Diagnosis     Leiomyoma of uterus     Iron deficiency anemia due to chronic blood loss     MENORRHAGIA     Family history of ischemic heart disease     Family history of diabetes mellitus     MORBID OBESITY BMI 44.79 6/05     Hyperlipidemia     FAMILY HX-HYPOTHYROID      Other type of migraine     Abnormal glucose     HYPERLIPIDEMIA LDL GOAL <160     Allergic rhinitis due to other allergen     Prehypertension     Malignant neoplasm of lower-outer quadrant of left female breast (H)     Prediabetes     Past Surgical History:   Procedure Laterality Date     CL AFF SURGICAL PATHOLOGY  2005    UTERINE LEIOMYOMA  [D25.9]     HC BIOPSY/EXCIS CERVICAL LESION W/WO FULGURATION  08-15-00,12-    endocervical polypectomy     HC TOOTH EXTRACTION W/FORCEP      wisdom teeth     LUMPECTOMY BREAST WITH SENTINEL NODE, COMBINED Left 6/29/2016    Segmental mastectomy with axillary lymph node dissection     REMOVE PORT VASCULAR ACCESS Right 6/29/2016    Procedure: REMOVE PORT VASCULAR ACCESS;  Surgeon: Gianna Amezcua MD;  Location:  OR       Social History   Substance Use Topics     Smoking status: Never Smoker     Smokeless tobacco: Never Used     Alcohol use No      Comment: no alcohol use     Family History   Problem Relation Age of Onset     C.A.D. Father      [id number]     DIABETES Father      Type 2     Prostate Cancer Father      Alcohol/Drug Father      Hypertension Father  "     Depression Father      Eye Disorder Father      cataracts bilat     Lipids Father      Thyroid Disease Father      hypo     Hypertension Mother      Alzheimer Disease Mother      DX ~75     Eye Disorder Mother      cataract one eye      OSTEOPOROSIS Mother      early stages     Thyroid Disease Mother      hypo     Genitourinary Problems Sister      kidney stones     Lipids Sister              Reviewed and updated as needed this visit by clinical staff       Reviewed and updated as needed this visit by Provider         ROS:  Constitutional, skin systems are negative, except as otherwise noted.      OBJECTIVE:                                                    /83  Pulse 85  Temp 98.9  F (37.2  C) (Oral)  Ht 5' 4\" (1.626 m)  Wt 211 lb (95.7 kg)  LMP 10/28/2009  SpO2 97%  BMI 36.22 kg/m2  Body mass index is 36.22 kg/(m^2).  GENERAL APPEARANCE: healthy, alert and no distress  SKIN: marked erythema of the right 3rd digit on hand with spreading erythema proximal, mod-severe TTP, no fluctuance noted   PSYCH: mentation appears normal and affect normal/bright       ASSESSMENT:                                                      1. Cellulitis of finger of right hand         PLAN:                                                    She has only had 2 doses of Augmentin. Will add Bactrim DS to antibiotic regimen. Discussed s/s that would prompt an ER visit over the weekend.    Patient Instructions   If you develop a reaction to the antibiotics, stop the medication and get to the urgent care or ER.  If your infections worsens and you develop fevers and/or chills get to the ER.    Continue soaking the finger in warm water.  Start the Bactrim DS. Continue the Augmentin.    Start ibuprofen 600mg every 8 hours for the next 2-3 days.       The patient was in agreement with the plan today and had no questions or concerns prior to leaving the clinic.     Dee Dee Jacobs PA-C  [facility] [place]  "

## 2017-12-08 NOTE — PATIENT INSTRUCTIONS
If you develop a reaction to the antibiotics, stop the medication and get to the urgent care or ER.  If your infections worsens and you develop fevers and/or chills get to the ER.    Continue soaking the finger in warm water.  Start the Bactrim DS. Continue the Augmentin.    Start ibuprofen 600mg every 8 hours for the next 2-3 days.

## 2017-12-08 NOTE — MR AVS SNAPSHOT
"              After Visit Summary   12/8/2017    Remedios Osorio    MRN: [id number]           Patient Information     Date Of Birth          1955        Visit Information        Provider Department      12/8/2017 10:20 AM Dee Dee Jacobs PA-C [facility]        Today's Diagnoses     Cellulitis of finger of right hand    -  1      Care Instructions    If you develop a reaction to the antibiotics, stop the medication and get to the urgent care or ER.  If your infections worsens and you develop fevers and/or chills get to the ER.    Continue soaking the finger in warm water.  Start the Bactrim DS. Continue the Augmentin.    Start ibuprofen 600mg every 8 hours for the next 2-3 days.          Follow-ups after your visit        Your next 10 appointments already scheduled     Dec 15, 2017  8:00 AM CST   (Arrive by 7:30 AM)   Treatment 62 with Ira You, PT   [facility] [place] [facility] ([facility])    [address] [place] [address]  1st Floor  [address]  [facility] [id number] [phone]            Feb 26, 2018  4:00 PM CST   (Arrive by 3:45 PM)   MA SCREENING BILATERAL W/ GABRIEL with [id number]   [facility] ([facility])    [address] [place] [address]  2nd [facility] [id number] [phone]           Three-dimensional (3D) mammograms are available at [place] locations in [facility], [place], [place], [facility], [place], [place], and [place]. [facility] locations include [place] and [facility] Center in [place]. Benefits of 3D mammograms include: - Improved rate of cancer detection - Decreases your chance of having to go back for more tests, which means fewer: - \"False-positive\" results (This means that there is an abnormal area but it isn't cancer.) - Invasive testing procedures, such as a biopsy or surgery - Can provide clearer images of the breast if you have dense breast " tissue. 3D mammography is an optional exam that anyone can have with a 2D mammogram. It doesn't replace or take the place of a 2D mammogram. 2D mammograms remain an effective screening test for all women.  Not all insurance companies cover the cost of a 3D mammogram. Check with your insurance.            Feb 26, 2018  4:15 PM CST   [facility] Draw with  [facility] DRAW   [facility] ([facility])    [address] [place] [id number] [phone]            Feb 26, 2018  4:45 PM CST   (Arrive by 4:30 PM)   Return Visit with Kanchan Patel MD   [facility] ([facility])    [address] [place] [id number] [phone]              Who to contact     Normal or non-critical lab and imaging results will be communicated to you by [email or web address], letter or phone within 4 business days after the clinic has received the results. If you do not hear from us within 7 days, please contact the clinic through [email or web address] or phone. If you have a critical or abnormal lab result, we will notify you by phone as soon as possible.  Submit refill requests through BrightFunnel or call your pharmacy and they will forward the refill request to us. Please allow 3 business days for your refill to be completed.          If you need to speak with a  for additional information , please call: [phone]             Additional Information About Your Visit        BrightFunnel Information     BrightFunnel gives you secure access to your electronic health record. If you see a primary care provider, you can also send messages to your care team and make appointments. If you have questions, please call your primary care clinic.  If you do not have a primary care provider, please call [phone] and they will assist you.        Care EveryWhere ID     This is your Care EveryWhere ID. This could be used by  "other organizations to access your [facility] medical records  [id number]        Your Vitals Were     Pulse Temperature Height Last Period Pulse Oximetry BMI (Body Mass Index)    85 98.9  F (37.2  C) (Oral) 5' 4\" (1.626 m) 10/28/2009 97% 36.22 kg/m2       Blood Pressure from Last 3 Encounters:   12/08/17 136/83   11/20/17 125/77   11/15/17 132/80    Weight from Last 3 Encounters:   12/08/17 211 lb (95.7 kg)   11/20/17 214 lb 11.2 oz (97.4 kg)   08/07/17 212 lb (96.2 kg)              Today, you had the following     No orders found for display         Today's Medication Changes          These changes are accurate as of: 12/8/17 10:52 AM.  If you have any questions, ask your nurse or doctor.               Start taking these medicines.        Dose/Directions    sulfamethoxazole-trimethoprim 800-160 MG per tablet   Commonly known as:  BACTRIM DS/SEPTRA DS   Used for:  Cellulitis of finger of right hand   Started by:  Dee Dee Jacobs PA-C        Dose:  1 tablet   Take 1 tablet by mouth 2 times daily for 10 days   Quantity:  20 tablet   Refills:  0            Where to get your medicines      These medications were sent to [facility] - [address] [facility]  [address] [facility] [address] [address]     Phone:  [phone]     sulfamethoxazole-trimethoprim 800-160 MG per tablet                Primary Care Provider Office Phone # Fax #    [facility] [phone]       [place] [address]  [facility] [address]  [facility] [address]        Equal Access to Services     MERON KNIGHT AH: Hadii meredith trevino Somily, waaxda luqadaha, qaybta kaalmada adegeorge, paolo lang. So [facility] [phone].    ATENCIÓN: Si habla español, tiene a law disposición servicios gratuitos de asistencia lingüística. Llame al [phone].    We comply with applicable federal civil rights laws and [place] laws. We do not " discriminate on the basis of race, color, national origin, age, disability, sex, sexual orientation, or gender identity.            Thank you!     Thank you for choosing [facility]  for your care. Our goal is always to provide you with excellent care. Hearing back from our patients is one way we can continue to improve our services. Please take a few minutes to complete the written survey that you may receive in the mail after your visit with us. Thank you!             Your Updated Medication List - Protect others around you: Learn how to safely use, store and throw away your medicines at www.disposemymeds.org.          This list is accurate as of: 12/8/17 10:52 AM.  Always use your most recent med list.                   Brand Name Dispense Instructions for use Diagnosis    letrozole 2.5 MG tablet    FEMARA    90 tablet    Take 1 tablet (2.5 mg) by mouth daily    Malignant neoplasm of lower-outer quadrant of left female breast (H)       * order for DME     1 each    Equipment being ordered: Sit Stand Desk    Malignant neoplasm of female breast, unspecified laterality, unspecified site of breast       * order for DME     1 Device    Equipment being ordered: Compression sleeve    Lymphedema of left arm       sulfamethoxazole-trimethoprim 800-160 MG per tablet    BACTRIM DS/SEPTRA DS    20 tablet    Take 1 tablet by mouth 2 times daily for 10 days    Cellulitis of finger of right hand       * Notice:  This list has 2 medication(s) that are the same as other medications prescribed for you. Read the directions carefully, and ask your doctor or other care provider to review them with you.

## 2017-12-15 ENCOUNTER — HOSPITAL ENCOUNTER (OUTPATIENT)
Dept: PHYSICAL THERAPY | Facility: CLINIC | Age: 62
Setting detail: THERAPIES SERIES
End: 2017-12-15
Attending: INTERNAL MEDICINE
Payer: COMMERCIAL

## 2017-12-15 DIAGNOSIS — I89.0 LYMPHEDEMA: Primary | ICD-10-CM

## 2017-12-15 PROCEDURE — 97535 SELF CARE MNGMENT TRAINING: CPT | Mod: GP | Performed by: PHYSICAL THERAPIST

## 2017-12-15 PROCEDURE — 97140 MANUAL THERAPY 1/> REGIONS: CPT | Mod: GP | Performed by: PHYSICAL THERAPIST

## 2017-12-15 PROCEDURE — 40000449 ZZHC STATISTIC PT VISIT, LYMPHEDEMA: Performed by: PHYSICAL THERAPIST

## 2017-12-18 ENCOUNTER — NURSE TRIAGE (OUTPATIENT)
Dept: NURSING | Facility: CLINIC | Age: 62
End: 2017-12-18

## 2017-12-18 ENCOUNTER — TELEPHONE (OUTPATIENT)
Dept: FAMILY MEDICINE | Facility: CLINIC | Age: 62
End: 2017-12-18

## 2017-12-18 NOTE — TELEPHONE ENCOUNTER
On 12/8/2017 Remedios was into clinic and met with Dee Dee Jacobs PA-C and was diagnosed with cellulitis of right hand and started bactrim.    Remedios has finished medication but is requesting to speak with MITESH Jacobs as her hand is still red and she is wondering if she   Needs another round.  Remedios is a [employer] Employee and is requesting a call back before 3:30 if possible as she is working.  Remedios can be reached   At [phone].

## 2017-12-18 NOTE — TELEPHONE ENCOUNTER
Clinic Action Needed:  Yes, callback  [facility] Triage Call  Presenting Problem:    On 12/8/2017 Remedios was into clinic and met with Dee Dee Jacobs PA-C and was diagnosed with cellulitis of right hand and started bactrim.    Remedios has finished medication but is requesting to speak with MITESH Jacobs as her hand is still red and she is wondering if she   Needs another round.  Remedios is a [employer] Employee and is requesting a call back before 3:30 if possible as she is working.  Remedios can be reached   At [phone].      Routed to:  RN Pool  Please be sure to close this encounter once this patient's issue/question has been addressed.    Cat Oilveira RN/[facility]

## 2017-12-18 NOTE — TELEPHONE ENCOUNTER
Spoke with patient. Sounds as if it's local irritation around the wound/scab. No tenderness, warmth, or streaking. Patient has finished both abx. Follow up if symptoms fail to improve or worsen.

## 2018-02-23 NOTE — PROGRESS NOTES
MEDICAL ONCOLOGY FOLLOW-UP PATIENT VISIT     NAME: Remedios Osorio     DATE: 2/26/2018    PRIMARY CARE PHYSICIAN: Kae Caal    PATIENT ID: Clinical Stage II-B Breast Cancer    Oncology History: Remedios Osorio is a 62 year old female with h/o stage IIIa, [id number], ER positive, AL positive, HER2 non-amplified invasive ductal carcinoma of the left breast. Her PCP palpated a breast mass in the lower outer left breast in 01/2016. Diagnostic mammogram and ultrasound showed a mass at 4:30, 8 cm from the nipple, measuring 3.3 cm on mammogram and 2.1 cm on ultrasound. She was also found to have multiple abnormal left axillary lymph nodes. The largest one was 2.4 cm. Core needle biopsy of her left breast mass demonstrated invasive ductal carcinoma, grade 3 with extensive tumor necrosis. Axillary LN biopsy was also positive for malignancy. The tumor cells were strongly positive for estrogen receptor (> 95%) and are moderate to strongly positive for progesterone receptor (60-70%). HER2/lizz was non-amplified by FISH.  PET/CT showed the left breast mass, 5 hypermetabolic left axillary lymph nodes, indeterminate left cervical chain lymph nodes, and an indeterminate hypodensity in the dome of the liver. She declined enrollment in the I-SPY2 clinical trial and agreed to proceed with neoadjuvant chemotherapy, she received 11 cycles of weekly Taxol, the 12th was cancelled due to neuropathy. She received one cycle of ddAC but did not tolerate this well due to fatigue and generalized weakness. She declined any further chemotherapy.    Left breast lumpectomy and left axillary lymph node dissection was performed by Dr. Amezcua on 6/29/16.  Pathology showed a residual 1.6 cm grade 2, IDC in the left breast.  Surgical margins were negative.  Invasive tumor cellularity of 30%.  Tumor infiltrating lymphocytes were estimated at 50%.  6/19 excised lymph nodes were involved with malignancy.  The largest lymph node metastasis measured  3.9 cm and had a 1 mm area of extranodal extension.  Minimal treatment related changes were noted in the lymph nodes.  Pathologic staging was B2nP3E9, or stage IIIa.  MD José Miguel residual cancer burden was Class III.  Unfortunately she was not eligible for ARIADNA due to having received only 13 weeks of neoadjuvant chemotherapy.  Adjuvant Xeloda was recommended, she declined.  She completed radiation on 10/17/16.  She has been on letrozole since early 09/2016.    Interim History:   Remedios comes in to clinic today for routine 3-month breast cancer followup.  Most concerning to her at this time is new back pain.  The pain is in the mid back.  It has been present for a few weeks, perhaps 1-2 months.  It is worse when she lies down at night.  In fact, it has been so severe at night that sometimes she is unable to lie down and she sleeps sitting up in bed.  She also intermittently has left-sided rib pain.  That pain comes and goes.  The pain has been bad enough that she has wanted to take pain medicine for it; however, she tries to avoid taking Tylenol or anti-inflammatories as much as possible.  She has no other bone or joint aches or pains.  She denies concerning lumps or masses of either breast.  She has been in good health.  She has had no fevers, chills or infectious complaints.  She has no cough, shortness of breath or chest pain.  She has no abdominal complaints.  She states that her mood has been stable.  She continues to follow a ketogenic diet and has lost an additional 2 pounds since her last visit.  She admits that she is no longer walking as much as she should.  Unfortunately, her sister broke her arm and that was her walking partner.  She tells me today that she resumed taking iodine supplements, despite my asking her to stop them previously due to thyroid dysfunction.  The remainder of a 10-point review of systems is otherwise negative.     PAST MEDICAL HISTORY:   Past Medical History:   Diagnosis Date      "ABNL LIVER FUN STUDY  W/ MONO  5/01     Breast cancer (H)      CHR BLOOD LOSS ANEMIA DUE TO FIBROIDS 6/9/2005     ELEVATED HBA1C 6.2% 6/05 8/6/2005     HX MUCOUS POLYPS OF CERVIX  2000     HYPERLIPIDEMIA       Infectious mononucleosis 5/01     MENORRHAGIA      MIGRAINE HEADACHES      MORBID OBESITY BMI 44.79 6/05 6/12/2005     UTERINE LEIOMYOMA  2/7/2003       PAST SURGICAL HISTORY:  Past Surgical History:   Procedure Laterality Date     CL AFF SURGICAL PATHOLOGY  2005    UTERINE LEIOMYOMA  [D25.9]     HC BIOPSY/EXCIS CERVICAL LESION W/WO FULGURATION  08-15-00,12-    endocervical polypectomy     HC TOOTH EXTRACTION W/FORCEP      wisdom teeth     LUMPECTOMY BREAST WITH SENTINEL NODE, COMBINED Left 6/29/2016    Segmental mastectomy with axillary lymph node dissection     REMOVE PORT VASCULAR ACCESS Right 6/29/2016    Procedure: REMOVE PORT VASCULAR ACCESS;  Surgeon: Gianna Amezcua MD;  Location:  OR     MEDS:  Current Outpatient Prescriptions   Medication     order for DME     order for DME     order for DME     letrozole (FEMARA) 2.5 MG tablet     order for DME     No current facility-administered medications for this visit.      Facility-Administered Medications Ordered in Other Visits   Medication     lidocaine BUFFERED 1 % solution 10 mL       ALLERGIES:  Allergies   Allergen Reactions     Benadryl [Diphenhydramine] Other (See Comments)     Pt had severe hypotension, nausea and vasovagal type reaction to IV Benadryl.   Give PO only.      Cats      Keflex [Cephalexin]      rash        PHYSICAL EXAM:  /67 (BP Location: Right arm, Patient Position: Sitting, Cuff Size: Adult Regular)  Pulse 87  Temp 99  F (37.2  C) (Oral)  Resp 18  Ht 1.626 m (5' 4.02\")  Wt 96.4 kg (212 lb 9.6 oz)  LMP 10/28/2009  BMI 36.47 kg/m2  LMP 10/28/2009  General:  Well appearing, obese adult female in NAD.  HEENT:  Normocephalic.  Sclera anicteric.  MMM.  No lesions of the oropharynx.  Lymph:  No palpable " cervical, supraclavicular, or axillary LAD.  Chest:  CTA bilaterally.  No wheezes or crackles.  CV:  RRR.  Nl S1 and S2.  No m/r/g.  Breast:  Bilateral breasts are of normal fibroglandular density.  There is enhancement of pores, skin thickening, and increased density of the left breast consistent with radiation change.  There are no discretely palpable masses in either breast.  Bilateral nipples are everted.  No nipple discharge.  Abd:  Soft/NT/ND.  BSs normoactive.  No hepatosplenomegaly.  Ext:  No pitting edema of the bilateral lower extremities.  Pulses 2+ and symmetric.  Musculo:  Strength 5/5 throughout.  No notable lymphedema of the LUE or left chest wall.  Neuro:  Cranial nerves grossly intact.  Psych:  Mood and affect appear normal.    LABS REVIEWED THIS VISIT:  Results for BURTON MONTELONGO (MRN [id number]) as of 2/26/2018 17:15   Ref. Range 2/26/2018 16:35   Sodium Latest Ref Range: 133 - 144 mmol/L 138   Potassium Latest Ref Range: 3.4 - 5.3 mmol/L 3.9   Chloride Latest Ref Range: 94 - 109 mmol/L 105   Carbon Dioxide Latest Ref Range: 20 - 32 mmol/L 25   Urea Nitrogen Latest Ref Range: 7 - 30 mg/dL 14   Creatinine Latest Ref Range: 0.52 - 1.04 mg/dL 0.75   GFR Estimate Latest Ref Range: >60 mL/min/1.7m2 78   GFR Estimate If Black Latest Ref Range: >60 mL/min/1.7m2 >90   Calcium Latest Ref Range: 8.5 - 10.1 mg/dL 9.4   Anion Gap Latest Ref Range: 3 - 14 mmol/L 8   Albumin Latest Ref Range: 3.4 - 5.0 g/dL 3.9   Protein Total Latest Ref Range: 6.8 - 8.8 g/dL 7.5   Bilirubin Total Latest Ref Range: 0.2 - 1.3 mg/dL 0.2   Alkaline Phosphatase Latest Ref Range: 40 - 150 U/L 91   ALT Latest Ref Range: 0 - 50 U/L 24   AST Latest Ref Range: 0 - 45 U/L 15   Results for BURTON MONTELONGO (MRN [id number]) as of 2/26/2018 17:15   Ref. Range 2/26/2018 16:35   WBC Latest Ref Range: 4.0 - 11.0 10e9/L 4.5   Hemoglobin Latest Ref Range: 11.7 - 15.7 g/dL 13.0   Hematocrit Latest Ref Range: 35.0 - 47.0 % 38.5   Platelet Count  "Latest Ref Range: 150 - 450 10e9/L 228       IMPRESSION/PLAN: Remedios Osorio is a 62 year old female with stage IIIa, H6uG4C8, ER/DE positive, HER2 negative left breast cancer. She is s/p 11 weeks of weekly taxol, 1 cycle of ddAC, left breast lumpectomy, ALND, and adjuvant radiation.  She has been on letrozole since early September, 2016.      1.  Stage III breast cancer:  Remedios is 1 year 8 months out from excision of her left sided breast cancer.  She continues on letrozole.  She is tolerating the medication well.  We plan to continue letrozole to complete a total of 10 years of endocrine therapy (through 09/2026).  There is no evidence of disease recurrence on either clinical breast exam or bilateral mammograms performed today. She does have new back pain, which we plan to evaluate with a NM bone scan.  As long as the bone scan is without concerning findings, I will see her back in 3 months for her next visit.    2.  Mid-back pain:  PET/CT in 02/2016 with bone spur in the mid-thoracic spine, however, given h/o stage III breast cancer and severity of pain with radiation to the left ribs, will obtain a NM bone scan at this time to rule out bone metastases.    3.  Subclinical hypothyroidism:  Elevated TSH with normal free T4.  Numbers are improved since stopping iodine supplements.  She admits that she has restarted taking iodine for its \"anti-cancer\" properties.  I encouraged to continue to refrain from exogenous iodine.  We will recheck a TSH and free T4 at her next visit.    4.  Neuropathy:  Stopped vitamin B6 at last visit.  Neuropathy is mild and not particularly bothersome at this time.    5.  Bone Health:  DEXA bone density scan performed in 10/2016 showed a lowest T-score of -0.5.  Continue calcium 1200 mg and vitamin D 1000 IU PO daily.  Will plan to repeat a DEXA in 10/2018.    6.  Lymphedema:  Little to no lymphedema noted on exam today.  Continue to wear compression sleeve with upper body activity and while " flying.  Follow up with lymphedema clinic prn.    7.  Follow Up:  Return to clinic in approximately 3 months for labs and visit with me.    It was a pleasure to see Remedios in clinic today.  A total of 25 minutes of our 30 minute face to face visit was spent in counseling.

## 2018-02-26 ENCOUNTER — ONCOLOGY VISIT (OUTPATIENT)
Dept: ONCOLOGY | Facility: CLINIC | Age: 63
End: 2018-02-26
Attending: INTERNAL MEDICINE
Payer: COMMERCIAL

## 2018-02-26 ENCOUNTER — APPOINTMENT (OUTPATIENT)
Dept: LAB | Facility: CLINIC | Age: 63
End: 2018-02-26
Attending: INTERNAL MEDICINE
Payer: COMMERCIAL

## 2018-02-26 ENCOUNTER — RADIANT APPOINTMENT (OUTPATIENT)
Dept: MAMMOGRAPHY | Facility: CLINIC | Age: 63
End: 2018-02-26
Payer: COMMERCIAL

## 2018-02-26 VITALS
HEIGHT: 64 IN | RESPIRATION RATE: 18 BRPM | HEART RATE: 87 BPM | DIASTOLIC BLOOD PRESSURE: 67 MMHG | BODY MASS INDEX: 36.29 KG/M2 | TEMPERATURE: 99 F | WEIGHT: 212.6 LBS | SYSTOLIC BLOOD PRESSURE: 142 MMHG

## 2018-02-26 DIAGNOSIS — M54.6 ACUTE MIDLINE THORACIC BACK PAIN: ICD-10-CM

## 2018-02-26 DIAGNOSIS — C50.512 MALIGNANT NEOPLASM OF LOWER-OUTER QUADRANT OF LEFT BREAST OF FEMALE, ESTROGEN RECEPTOR POSITIVE (H): Primary | ICD-10-CM

## 2018-02-26 DIAGNOSIS — Z17.0 MALIGNANT NEOPLASM OF LOWER-OUTER QUADRANT OF LEFT BREAST OF FEMALE, ESTROGEN RECEPTOR POSITIVE (H): Primary | ICD-10-CM

## 2018-02-26 DIAGNOSIS — Z12.31 ENCOUNTER FOR SCREENING MAMMOGRAM FOR BREAST CANCER: ICD-10-CM

## 2018-02-26 LAB
ALBUMIN SERPL-MCNC: 3.9 G/DL (ref 3.4–5)
ALP SERPL-CCNC: 91 U/L (ref 40–150)
ALT SERPL W P-5'-P-CCNC: 24 U/L (ref 0–50)
ANION GAP SERPL CALCULATED.3IONS-SCNC: 8 MMOL/L (ref 3–14)
AST SERPL W P-5'-P-CCNC: 15 U/L (ref 0–45)
BASOPHILS # BLD AUTO: 0.1 10E9/L (ref 0–0.2)
BASOPHILS NFR BLD AUTO: 1.3 %
BILIRUB SERPL-MCNC: 0.2 MG/DL (ref 0.2–1.3)
BUN SERPL-MCNC: 14 MG/DL (ref 7–30)
CALCIUM SERPL-MCNC: 9.4 MG/DL (ref 8.5–10.1)
CHLORIDE SERPL-SCNC: 105 MMOL/L (ref 94–109)
CO2 SERPL-SCNC: 25 MMOL/L (ref 20–32)
CREAT SERPL-MCNC: 0.75 MG/DL (ref 0.52–1.04)
DIFFERENTIAL METHOD BLD: NORMAL
EOSINOPHIL # BLD AUTO: 0.2 10E9/L (ref 0–0.7)
EOSINOPHIL NFR BLD AUTO: 3.5 %
ERYTHROCYTE [DISTWIDTH] IN BLOOD BY AUTOMATED COUNT: 14.8 % (ref 10–15)
GFR SERPL CREATININE-BSD FRML MDRD: 78 ML/MIN/1.7M2
GLUCOSE SERPL-MCNC: 100 MG/DL (ref 70–99)
HCT VFR BLD AUTO: 38.5 % (ref 35–47)
HGB BLD-MCNC: 13 G/DL (ref 11.7–15.7)
IMM GRANULOCYTES # BLD: 0 10E9/L (ref 0–0.4)
IMM GRANULOCYTES NFR BLD: 0.4 %
LYMPHOCYTES # BLD AUTO: 1.2 10E9/L (ref 0.8–5.3)
LYMPHOCYTES NFR BLD AUTO: 25.9 %
MCH RBC QN AUTO: 31 PG (ref 26.5–33)
MCHC RBC AUTO-ENTMCNC: 33.8 G/DL (ref 31.5–36.5)
MCV RBC AUTO: 92 FL (ref 78–100)
MONOCYTES # BLD AUTO: 0.5 10E9/L (ref 0–1.3)
MONOCYTES NFR BLD AUTO: 10 %
NEUTROPHILS # BLD AUTO: 2.7 10E9/L (ref 1.6–8.3)
NEUTROPHILS NFR BLD AUTO: 58.9 %
NRBC # BLD AUTO: 0 10*3/UL
NRBC BLD AUTO-RTO: 0 /100
PLATELET # BLD AUTO: 228 10E9/L (ref 150–450)
POTASSIUM SERPL-SCNC: 3.9 MMOL/L (ref 3.4–5.3)
PROT SERPL-MCNC: 7.5 G/DL (ref 6.8–8.8)
RBC # BLD AUTO: 4.2 10E12/L (ref 3.8–5.2)
SODIUM SERPL-SCNC: 138 MMOL/L (ref 133–144)
WBC # BLD AUTO: 4.5 10E9/L (ref 4–11)

## 2018-02-26 PROCEDURE — 99214 OFFICE O/P EST MOD 30 MIN: CPT | Mod: ZP | Performed by: INTERNAL MEDICINE

## 2018-02-26 PROCEDURE — 80053 COMPREHEN METABOLIC PANEL: CPT | Performed by: INTERNAL MEDICINE

## 2018-02-26 PROCEDURE — 85025 COMPLETE CBC W/AUTO DIFF WBC: CPT | Performed by: INTERNAL MEDICINE

## 2018-02-26 PROCEDURE — G0463 HOSPITAL OUTPT CLINIC VISIT: HCPCS | Mod: ZF

## 2018-02-26 PROCEDURE — 36415 COLL VENOUS BLD VENIPUNCTURE: CPT

## 2018-02-26 ASSESSMENT — PAIN SCALES - GENERAL: PAINLEVEL: MILD PAIN (3)

## 2018-02-26 NOTE — LETTER
2/26/2018       RE: Remedios Osorio  [address] [facility] [address]     Dear Colleague,    Thank you for referring your patient, Remedios Osorio, to the [facility]. Please see a copy of my visit note below.    MEDICAL ONCOLOGY FOLLOW-UP PATIENT VISIT     NAME: Remedios Osorio     DATE: 2/26/2018    PRIMARY CARE PHYSICIAN: Kae Caal    PATIENT ID: Clinical Stage II-B Breast Cancer    Oncology History: Remedios Osorio is a 62 year old female with h/o stage IIIa, [id number], ER positive, ME positive, HER2 non-amplified invasive ductal carcinoma of the left breast. Her PCP palpated a breast mass in the lower outer left breast in 01/2016. Diagnostic mammogram and ultrasound showed a mass at 4:30, 8 cm from the nipple, measuring 3.3 cm on mammogram and 2.1 cm on ultrasound. She was also found to have multiple abnormal left axillary lymph nodes. The largest one was 2.4 cm. Core needle biopsy of her left breast mass demonstrated invasive ductal carcinoma, grade 3 with extensive tumor necrosis. Axillary LN biopsy was also positive for malignancy. The tumor cells were strongly positive for estrogen receptor (> 95%) and are moderate to strongly positive for progesterone receptor (60-70%). HER2/lizz was non-amplified by FISH.  PET/CT showed the left breast mass, 5 hypermetabolic left axillary lymph nodes, indeterminate left cervical chain lymph nodes, and an indeterminate hypodensity in the dome of the liver. She declined enrollment in the I-SPY2 clinical trial and agreed to proceed with neoadjuvant chemotherapy, she received 11 cycles of weekly Taxol, the 12th was cancelled due to neuropathy. She received one cycle of ddAC but did not tolerate this well due to fatigue and generalized weakness. She declined any further chemotherapy.    Left breast lumpectomy and left axillary lymph node dissection was performed by Dr. Amezcua on 6/29/16.  Pathology showed a residual 1.6 cm grade 2, IDC in  the left breast.  Surgical margins were negative.  Invasive tumor cellularity of 30%.  Tumor infiltrating lymphocytes were estimated at 50%.  6/19 excised lymph nodes were involved with malignancy.  The largest lymph node metastasis measured 3.9 cm and had a 1 mm area of extranodal extension.  Minimal treatment related changes were noted in the lymph nodes.  Pathologic staging was [id number], or stage IIIa.  [facility] residual cancer burden was Class III.  Unfortunately she was not eligible for ARIADNA due to having received only 13 weeks of neoadjuvant chemotherapy.  Adjuvant Xeloda was recommended, she declined.  She completed radiation on 10/17/16.  She has been on letrozole since early 09/2016.    Interim History:   Remedios comes in to clinic today for routine 3-month breast cancer followup.  Most concerning to her at this time is new back pain.  The pain is in the mid back.  It has been present for a few weeks, perhaps 1-2 months.  It is worse when she lies down at night.  In fact, it has been so severe at night that sometimes she is unable to lie down and she sleeps sitting up in bed.  She also intermittently has left-sided rib pain.  That pain comes and goes.  The pain has been bad enough that she has wanted to take pain medicine for it; however, she tries to avoid taking Tylenol or anti-inflammatories as much as possible.  She has no other bone or joint aches or pains.  She denies concerning lumps or masses of either breast.  She has been in good health.  She has had no fevers, chills or infectious complaints.  She has no cough, shortness of breath or chest pain.  She has no abdominal complaints.  She states that her mood has been stable.  She continues to follow a ketogenic diet and has lost an additional 2 pounds since her last visit.  She admits that she is no longer walking as much as she should.  Unfortunately, her sister broke her arm and that was her walking partner.  She tells me today that she resumed  "taking iodine supplements, despite my asking her to stop them previously due to thyroid dysfunction.  The remainder of a 10-point review of systems is otherwise negative.     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       PAST SURGICAL HISTORY:  Past Surgical History:   Procedure Laterality Date     CL AFF SURGICAL PATHOLOGY  2005    UTERINE LEIOMYOMA  [D25.9]     HC BIOPSY/EXCIS CERVICAL LESION W/WO FULGURATION  08-15-00,12-    endocervical polypectomy     HC TOOTH EXTRACTION W/FORCEP      wisdom teeth     LUMPECTOMY BREAST WITH SENTINEL NODE, COMBINED Left 6/29/2016    Segmental mastectomy with axillary lymph node dissection     REMOVE PORT VASCULAR ACCESS Right 6/29/2016    Procedure: REMOVE PORT VASCULAR ACCESS;  Surgeon: Gianna Amezcua MD;  Location:  OR     MEDS:  Current Outpatient Prescriptions   Medication     order for DME     order for DME     order for DME     letrozole (FEMARA) 2.5 MG tablet     order for DME     No current facility-administered medications for this visit.      Facility-Administered Medications Ordered in Other Visits   Medication     lidocaine BUFFERED 1 % solution 10 mL       ALLERGIES:  Allergies   Allergen Reactions     Benadryl [Diphenhydramine] Other (See Comments)     Pt had severe hypotension, nausea and vasovagal type reaction to IV Benadryl.   Give PO only.      Cats      Keflex [Cephalexin]      rash        PHYSICAL EXAM:  /67 (BP Location: Right arm, Patient Position: Sitting, Cuff Size: Adult Regular)  Pulse 87  Temp 99  F (37.2  C) (Oral)  Resp 18  Ht 1.626 m (5' 4.02\") "  Wt 96.4 kg (212 lb 9.6 oz)  LMP 10/28/2009  BMI 36.47 kg/m2  LMP 10/28/2009  General:  Well appearing, obese adult female in NAD.  HEENT:  Normocephalic.  Sclera anicteric.  MMM.  No lesions of the oropharynx.  Lymph:  No palpable cervical, supraclavicular, or axillary LAD.  Chest:  CTA bilaterally.  No wheezes or crackles.  CV:  RRR.  Nl S1 and S2.  No m/r/g.  Breast:  Bilateral breasts are of normal fibroglandular density.  There is enhancement of pores, skin thickening, and increased density of the left breast consistent with radiation change.  There are no discretely palpable masses in either breast.  Bilateral nipples are everted.  No nipple discharge.  Abd:  Soft/NT/ND.  BSs normoactive.  No hepatosplenomegaly.  Ext:  No pitting edema of the bilateral lower extremities.  Pulses 2+ and symmetric.  Musculo:  Strength 5/5 throughout.  No notable lymphedema of the LUE or left chest wall.  Neuro:  Cranial nerves grossly intact.  Psych:  Mood and affect appear normal.    LABS REVIEWED THIS VISIT:  Results for BURTON MONTELONGO (MRN [id number]) as of 2/26/2018 17:15   Ref. Range 2/26/2018 16:35   Sodium Latest Ref Range: 133 - 144 mmol/L 138   Potassium Latest Ref Range: 3.4 - 5.3 mmol/L 3.9   Chloride Latest Ref Range: 94 - 109 mmol/L 105   Carbon Dioxide Latest Ref Range: 20 - 32 mmol/L 25   Urea Nitrogen Latest Ref Range: 7 - 30 mg/dL 14   Creatinine Latest Ref Range: 0.52 - 1.04 mg/dL 0.75   GFR Estimate Latest Ref Range: >60 mL/min/1.7m2 78   GFR Estimate If Black Latest Ref Range: >60 mL/min/1.7m2 >90   Calcium Latest Ref Range: 8.5 - 10.1 mg/dL 9.4   Anion Gap Latest Ref Range: 3 - 14 mmol/L 8   Albumin Latest Ref Range: 3.4 - 5.0 g/dL 3.9   Protein Total Latest Ref Range: 6.8 - 8.8 g/dL 7.5   Bilirubin Total Latest Ref Range: 0.2 - 1.3 mg/dL 0.2   Alkaline Phosphatase Latest Ref Range: 40 - 150 U/L 91   ALT Latest Ref Range: 0 - 50 U/L 24   AST Latest Ref Range: 0 - 45 U/L 15   Results for BURTON MONTELONGO  "(MRN [id number]) as of 2/26/2018 17:15   Ref. Range 2/26/2018 16:35   WBC Latest Ref Range: 4.0 - 11.0 10e9/L 4.5   Hemoglobin Latest Ref Range: 11.7 - 15.7 g/dL 13.0   Hematocrit Latest Ref Range: 35.0 - 47.0 % 38.5   Platelet Count Latest Ref Range: 150 - 450 10e9/L 228       IMPRESSION/PLAN: Remedios Osorio is a 62 year old female with stage IIIa, M9mL5C2, ER/NE positive, HER2 negative left breast cancer. She is s/p 11 weeks of weekly taxol, 1 cycle of ddAC, left breast lumpectomy, ALND, and adjuvant radiation.  She has been on letrozole since early September, 2016.      1.  Stage III breast cancer:  Remedios is 1 year 8 months out from excision of her left sided breast cancer.  She continues on letrozole.  She is tolerating the medication well.  We plan to continue letrozole to complete a total of 10 years of endocrine therapy (through 09/2026).  There is no evidence of disease recurrence on either clinical breast exam or bilateral mammograms performed today. She does have new back pain, which we plan to evaluate with a NM bone scan.  As long as the bone scan is without concerning findings, I will see her back in 3 months for her next visit.    2.  Mid-back pain:  PET/CT in 02/2016 with bone spur in the mid-thoracic spine, however, given h/o stage III breast cancer and severity of pain with radiation to the left ribs, will obtain a NM bone scan at this time to rule out bone metastases.    3.  Subclinical hypothyroidism:  Elevated TSH with normal free T4.  Numbers are improved since stopping iodine supplements.  She admits that she has restarted taking iodine for its \"anti-cancer\" properties.  I encouraged to continue to refrain from exogenous iodine.  We will recheck a TSH and free T4 at her next visit.    4.  Neuropathy:  Stopped vitamin B6 at last visit.  Neuropathy is mild and not particularly bothersome at this time.    5.  Bone Health:  DEXA bone density scan performed in 10/2016 showed a lowest T-score of -0.5.  " Continue calcium 1200 mg and vitamin D 1000 IU PO daily.  Will plan to repeat a DEXA in 10/2018.    6.  Lymphedema:  Little to no lymphedema noted on exam today.  Continue to wear compression sleeve with upper body activity and while flying.  Follow up with lymphedema clinic prn.    7.  Follow Up:  Return to clinic in approximately 3 months for labs and visit with me.    It was a pleasure to see Remedios in clinic today.  A total of 25 minutes of our 30 minute face to face visit was spent in counseling.    Again, thank you for allowing me to participate in the care of your patient.      Sincerely,    Kanchan Patel MD

## 2018-02-26 NOTE — NURSING NOTE
Chief Complaint   Patient presents with     Blood Draw     Labs drawn via  by RN. VS taken.     Amina Becker RN

## 2018-02-26 NOTE — NURSING NOTE
"Oncology Rooming Note    February 26, 2018 4:42 PM   Remedios Osorio is a 62 year old female who presents for:    Chief Complaint   Patient presents with     Blood Draw     Labs drawn via  by RN. VS taken.     Oncology Clinic Visit     Return: Breast CA     Initial Vitals: /67 (BP Location: Right arm, Patient Position: Sitting, Cuff Size: Adult Regular)  Pulse 87  Temp 99  F (37.2  C) (Oral)  Resp 18  Ht 1.626 m (5' 4.02\")  Wt 96.4 kg (212 lb 9.6 oz)  LMP 10/28/2009  BMI 36.47 kg/m2 Estimated body mass index is 36.47 kg/(m^2) as calculated from the following:    Height as of this encounter: 1.626 m (5' 4.02\").    Weight as of this encounter: 96.4 kg (212 lb 9.6 oz). Body surface area is 2.09 meters squared.  Mild Pain (3) Comment: Data Unavailable   Patient's last menstrual period was 10/28/2009.  Allergies reviewed: Yes  Medications reviewed: Yes    Medications: Medication refills not needed today.  Pharmacy name entered into [facility]:    [facility] - [place], [place] - [address]  [id number] - [place], [place] - [address]  [facility] [place], [place] [address] [facility] [place], [place] - [address] [facility] [place], [place] - [address] [facility] [place], [place] - [address]  [facility] [place], [place] - [address] [facility]    Clinical concerns: pain level 3 bilateral hands.  I informed pt to remind the Provider/ADONIS about  pain level in case i dont touch bases with them, if the provider was in the exam room while i attend on rooming the next pt. Pt verbalized understandings.  Leonor Chandler, LEANDRO  Patient was offered a flu vaccine today but declined.   pt did not have colonoscopy done yet, contact information has been added to AVS.           6 minutes for nursing intake (face to face time)     Leonor Chandler" CMA

## 2018-02-26 NOTE — PATIENT INSTRUCTIONS
Preventive Care:     Colorectal Cancer Screening: During our visit today, we discussed that it is recommended you receive colorectal cancer screening. Please call or make an appointment with your primary care provider to discuss this. You may also call the [facility] scheduling line ([phone]) to set up a colonoscopy appointment.

## 2018-02-26 NOTE — MR AVS SNAPSHOT
After Visit Summary   2/26/2018    Remedios Osorio    MRN: [id number]           Patient Information     Date Of Birth          1955        Visit Information        Provider Department      2/26/2018 4:45 PM Kanchan Patel MD [facility]        Today's Diagnoses     Malignant neoplasm of lower-outer quadrant of left breast of female, estrogen receptor positive (H)    -  1    Acute midline thoracic back pain          Care Instructions    Preventive Care:     Colorectal Cancer Screening: During our visit today, we discussed that it is recommended you receive colorectal cancer screening. Please call or make an appointment with your primary care provider to discuss this. You may also call the [facility] scheduling line ([phone]) to set up a colonoscopy appointment.            Follow-ups after your visit        Your next 10 appointments already scheduled     Mar 09, 2018 12:00 PM CST   (Arrive by 11:45 AM)   NM INJECTION with [id number]   [facility], Nuclear Medicine ([facility])    [address] [facility] [address] [phone]            Mar 09, 2018  3:00 PM CST   (Arrive by 2:45 PM)   NM BONE SCAN WHOLE BODY with UUNM3   [facility], Nuclear Medicine ([facility])    [address] [address] [phone]           Please bring a list of your medicines to the exam. (Include vitamins, minerals and over-the-counter drugs.) You should wear comfortable clothes. Leave your valuables at home. Please bring related prior results and films.  Tell your doctor:   If you are breastfeeding or may be pregnant.   If you have had a barium test within the past 48 hours. Barium may change the results of certain exams.   If you think you may need sedation (medicine to help you relax).  You may eat and drink as normal.  Please call your Imaging  Department at your exam site with any questions.            Jun 04, 2018  3:15 PM CDT   [facility] Lab Draw with  [facility] LAB DRAW   [facility] Draw ([facility])    [address] [place] [address]  [address]  [facility] [address] [phone]            Jun 04, 2018  3:45 PM CDT   (Arrive by 3:30 PM)   Return Visit with Kanchan Patel MD   [facility] ([facility])    [address] [place] [address]  [address]  [facility] [address] [phone]              Who to contact     If you have questions or need follow up information about today's clinic visit or your schedule please contact [facility] directly at [phone].  Normal or non-critical lab and imaging results will be communicated to you by MyChart, letter or phone within 4 business days after the clinic has received the results. If you do not hear from us within 7 days, please contact the clinic through [employer] or phone. If you have a critical or abnormal lab result, we will notify you by phone as soon as possible.  Submit refill requests through [employer] or call your pharmacy and they will forward the refill request to us. Please allow 3 business days for your refill to be completed.          Additional Information About Your Visit        [employer] Information     [employer] gives you secure access to your electronic health record. If you see a primary care provider, you can also send messages to your care team and make appointments. If you have questions, please call your primary care clinic.  If you do not have a primary care provider, please call [phone] and they will assist you.        Care EveryWhere ID     This is your Care EveryWhere ID. This could be used by other organizations to access your [facility] medical records  [id number]        Your Vitals Were     Pulse Temperature Respirations Height Last Period BMI (Body Mass Index)    87 99  F (37.2  " C) (Oral) 18 1.626 m (5' 4.02\") 10/28/2009 36.47 kg/m2       Blood Pressure from Last 3 Encounters:   02/26/18 142/67   12/08/17 136/83   11/20/17 125/77    Weight from Last 3 Encounters:   02/26/18 96.4 kg (212 lb 9.6 oz)   12/08/17 95.7 kg (211 lb)   11/20/17 97.4 kg (214 lb 11.2 oz)              We Performed the Following     CBC with platelets differential     Comprehensive metabolic panel        Primary Care Provider Office Phone # Fax #    [facility] [phone]       [place] [address]  [facility] [address]  [facility] [address]        Equal Access to Services     ISABELA KNIGHT : Hadii meredith Ramirez, waaxda luqadaha, qaybta kaalmada fran, paolo madden . So [facility] [phone].    ATENCIÓN: Si habla español, tiene a law disposición servicios gratuitos de asistencia lingüística. Genna al [phone].    We comply with applicable federal civil rights laws and [place] laws. We do not discriminate on the basis of race, color, national origin, age, disability, sex, sexual orientation, or gender identity.            Thank you!     Thank you for choosing [facility]  for your care. Our goal is always to provide you with excellent care. Hearing back from our patients is one way we can continue to improve our services. Please take a few minutes to complete the written survey that you may receive in the mail after your visit with us. Thank you!             Your Updated Medication List - Protect others around you: Learn how to safely use, store and throw away your medicines at www.disposemymeds.org.          This list is accurate as of 2/26/18 11:59 PM.  Always use your most recent med list.                   Brand Name Dispense Instructions for use Diagnosis    CALCIUM + D PO           IODINE (KELP) PO           letrozole 2.5 MG tablet    FEMARA    90 tablet    Take 1 tablet (2.5 mg) by mouth " daily    Malignant neoplasm of lower-outer quadrant of left female breast (H)       * order for DME     1 each    Equipment being ordered: Sit Stand Desk    Malignant neoplasm of female breast, unspecified laterality, unspecified site of breast       * order for DME     1 Device    Equipment being ordered: Compression sleeve    Lymphedema of left arm       * order for DME     1 each    Equipment being ordered: Compression sleeve/gauntlet 20-40 mmHG    Lymphedema       * order for DME     1 each    Equipment being ordered: compression bra    Lymphedema       * Notice:  This list has 4 medication(s) that are the same as other medications prescribed for you. Read the directions carefully, and ask your doctor or other care provider to review them with you.

## 2018-03-09 ENCOUNTER — HOSPITAL ENCOUNTER (OUTPATIENT)
Dept: NUCLEAR MEDICINE | Facility: CLINIC | Age: 63
Setting detail: NUCLEAR MEDICINE
End: 2018-03-09
Attending: INTERNAL MEDICINE
Payer: COMMERCIAL

## 2018-03-09 ENCOUNTER — HOSPITAL ENCOUNTER (OUTPATIENT)
Dept: NUCLEAR MEDICINE | Facility: CLINIC | Age: 63
Setting detail: NUCLEAR MEDICINE
Discharge: HOME | End: 2018-03-09
Attending: INTERNAL MEDICINE | Admitting: INTERNAL MEDICINE
Payer: COMMERCIAL

## 2018-03-09 DIAGNOSIS — Z17.0 MALIGNANT NEOPLASM OF LOWER-OUTER QUADRANT OF LEFT BREAST OF FEMALE, ESTROGEN RECEPTOR POSITIVE (H): ICD-10-CM

## 2018-03-09 DIAGNOSIS — C50.512 MALIGNANT NEOPLASM OF LOWER-OUTER QUADRANT OF LEFT BREAST OF FEMALE, ESTROGEN RECEPTOR POSITIVE (H): ICD-10-CM

## 2018-03-09 DIAGNOSIS — M54.6 ACUTE MIDLINE THORACIC BACK PAIN: ICD-10-CM

## 2018-03-09 PROCEDURE — 34300033 ZZH RX 343: Performed by: INTERNAL MEDICINE

## 2018-03-09 PROCEDURE — 78306 BONE IMAGING WHOLE BODY: CPT

## 2018-03-09 PROCEDURE — A9503 TC99M MEDRONATE: HCPCS | Performed by: INTERNAL MEDICINE

## 2018-03-09 RX ORDER — TC 99M MEDRONATE 20 MG/10ML
25 INJECTION, POWDER, LYOPHILIZED, FOR SOLUTION INTRAVENOUS ONCE
Status: COMPLETED | OUTPATIENT
Start: 2018-03-09 | End: 2018-03-09

## 2018-03-09 RX ADMIN — TC 99M MEDRONATE 18.1 MCI.: 20 INJECTION, POWDER, LYOPHILIZED, FOR SOLUTION INTRAVENOUS at 12:22

## 2018-03-12 ENCOUNTER — TELEPHONE (OUTPATIENT)
Dept: ONCOLOGY | Facility: CLINIC | Age: 63
End: 2018-03-12

## 2018-03-12 NOTE — TELEPHONE ENCOUNTER
I spoke to patient, per Dr Patel bone scan completed 3/9/18 shows no evidence of cancer in the bones. To treat pt's ongoing back pain, MD recommends PT/OT Cancer Rehab; referral placed, [facility] tasked to facilitate an appointment. Pt agrees & verbalizes understanding of this plan.

## 2018-03-19 DIAGNOSIS — G89.29 CHRONIC MIDLINE THORACIC BACK PAIN: Primary | ICD-10-CM

## 2018-03-19 DIAGNOSIS — M54.6 CHRONIC MIDLINE THORACIC BACK PAIN: Primary | ICD-10-CM

## 2018-03-23 ENCOUNTER — TELEPHONE (OUTPATIENT)
Dept: FAMILY MEDICINE | Facility: CLINIC | Age: 63
End: 2018-03-23

## 2018-03-23 NOTE — TELEPHONE ENCOUNTER
Panel Management Review      Patient has the following on her problem list: None      Composite cancer screening  Chart review shows that this patient is due/due soon for the following Pap Smear and Colonoscopy  Summary:    Patient is due/failing the following:   Physical with pap  Colonoscopy  Fasting labs-lipid and hep c screen      Type of outreach:    Sent [employer] message.    Questions for provider review:    None                                                                                                                                    Susana French MA       Chart routed to Care Team .

## 2018-03-28 ENCOUNTER — HOSPITAL ENCOUNTER (OUTPATIENT)
Dept: PHYSICAL THERAPY | Facility: CLINIC | Age: 63
Setting detail: THERAPIES SERIES
End: 2018-03-28
Attending: INTERNAL MEDICINE
Payer: COMMERCIAL

## 2018-03-28 PROCEDURE — 40000185 ZZHC STATISTIC PT OUTPT VISIT: Performed by: PHYSICAL THERAPIST

## 2018-03-28 PROCEDURE — 97161 PT EVAL LOW COMPLEX 20 MIN: CPT | Mod: GP | Performed by: PHYSICAL THERAPIST

## 2018-03-28 PROCEDURE — 97110 THERAPEUTIC EXERCISES: CPT | Mod: GP | Performed by: PHYSICAL THERAPIST

## 2018-03-29 NOTE — PROGRESS NOTES
03/28/18 0800   General Information   Start of Care Date 03/28/18   Referring Physician Kanchan Patel   Orders Evaluate and Treat as Indicated   Additional Orders back pain   Order Date 03/19/18   Medical Diagnosis back pain   Special Instructions neg bone scan of spine   Surgical/Medical history reviewed Yes   Pertinent history of current problem (include personal factors and/or comorbidities that impact the POC) Remedios here alone.  bones are good/ no mets.  back pain.  can't sleep/ can't get comfotable.  in pain.  hands/wrists stiff candace R.  L trigger thumb.  pain in L hand.   hx breast ca w/ lymphnode removal.  had 20 min seated massage.  i am ticklish.  may try chiro.     Pertinent Visual History  wfls   Prior level of functional mobility Ambulation   Ambulation walks dogs occas, not on a home program, not walking alot. sits for work   Previous/Current Treatment Physical Therapy   Improvement after PT Moderate   Living environment House/[facility]   Home/Community Accessibility Comments 2 step entry via garage. 2 level house + basement, 1 side rail on each.    Patient/Family Goals Statement sleep better.  no pain back.  should not sleep on L side.     General Information Comments on a hormone med.  post cancer.  may be a side effect (current pain).  on it for 10 yr.  2016 started.     Fall Risk Screen   Fall screen completed by PT   Have you fallen 2 or more times in the past year? No   Have you fallen and had an injury in the past year? No   Fall screen comments few yr ago - fell on L hand and L hip.     Pain   Patient currently in pain Yes   Pain comments minimal pain during day.  not at work.  only feels it in bed.  started January.  jan/feb was bad.  pain in bed.  hurts w/ turning.  5-6 on 0-10 scale.  went for a walk w/ 2 dogs.  less active during winter.  walked 20 min.     Cognitive Status Examination   Orientation orientation to person, place and time   Level of Consciousness alert   Follows Commands  and Answers Questions 100% of the time   Personal Safety and Judgment intact   Memory intact   Integumentary   Integumentary No deficits were identified   Posture   Posture Comments mild thoracic kyphosis. very tender to palpate T6 and 7/ 8.  able to rotate seated   Range of Motion (ROM)   ROM Comment L arm shoulder flex 125 / abd 140.     Bed Mobility   Bed Mobility Comments painful in supine   Transfer Skills   Transfer Comments indep   Locomotion   Wheel Chair Mobility Comments n/a   Gait   Gait Comments sl flexed. forw head   Gait Special Tests 25 Foot Timed Walk   Seconds 8.2   Steps 14 Steps   Balance   Balance Comments not tested   Sensory Examination   Sensory Perception Comments L hand N/T.     Coordination   Coordination Comments L hand issues w/ ring finger. and pain - rec she ask doc for hand therapy referrral.   Muscle Tone   Muscle Tone no deficits were identified   Planned Therapy Interventions   Planned Therapy Interventions neuromuscular re-education;ROM;strengthening;stretching;manual therapy   Clinical Impression   Criteria for Skilled Therapeutic Interventions Met yes, treatment indicated   PT Diagnosis mid thoracic spine stiffness/pain/ poor posture   Influenced by the following impairments pain, decreased spine and shoulder rom   Functional limitations due to impairments can't sleep. can't lie supine.   Clinical Presentation Stable/Uncomplicated   Clinical Decision Making (Complexity) Low complexity   Therapy Frequency other (see comments)   Predicted Duration of Therapy Intervention (days/wks) up to 10 PT sessions over 90 days   Risk & Benefits of therapy have been explained Yes   Patient, Family & other staff in agreement with plan of care Yes   Clinical Impression Comments 63 yo known to me from cancer rehab, no bone mets, back pain w/ thoracic stiffness   Education Assessment   Preferred Learning Style Demonstration   Barriers to Learning Physical   GOALS   PT Eval Goals 1;2;3   Goal 1    Goal Identifier pain   Goal Description pt to report pain at 2 or lower w/ indep HEP for spine   Target Date 06/25/18   Goal 2   Goal Identifier posture   Goal Description pt to verbalize 3 or more ways to improve/be aware of seated work/ sleeping posture to minimize back pain   Target Date 06/25/18   Goal 3   Goal Identifier sleep   Goal Description pt to report improved sleep to 4-6 hr pain free w/ props as needed for comfort   Target Date 06/25/18   Total Evaluation Time   Total Evaluation Time (Minutes) 30

## 2018-04-06 ENCOUNTER — HOSPITAL ENCOUNTER (OUTPATIENT)
Dept: PHYSICAL THERAPY | Facility: CLINIC | Age: 63
Setting detail: THERAPIES SERIES
End: 2018-04-06
Attending: INTERNAL MEDICINE
Payer: COMMERCIAL

## 2018-04-06 PROCEDURE — 97530 THERAPEUTIC ACTIVITIES: CPT | Mod: GP | Performed by: PHYSICAL THERAPIST

## 2018-04-06 PROCEDURE — 97110 THERAPEUTIC EXERCISES: CPT | Mod: GP | Performed by: PHYSICAL THERAPIST

## 2018-04-06 PROCEDURE — 40000719 ZZHC STATISTIC PT DEPARTMENT NEURO VISIT: Performed by: PHYSICAL THERAPIST

## 2018-04-13 ENCOUNTER — HOSPITAL ENCOUNTER (OUTPATIENT)
Dept: PHYSICAL THERAPY | Facility: CLINIC | Age: 63
Setting detail: THERAPIES SERIES
End: 2018-04-13
Attending: INTERNAL MEDICINE
Payer: COMMERCIAL

## 2018-04-13 PROCEDURE — 97110 THERAPEUTIC EXERCISES: CPT | Mod: GP | Performed by: PHYSICAL THERAPIST

## 2018-04-13 PROCEDURE — 97530 THERAPEUTIC ACTIVITIES: CPT | Mod: GP | Performed by: PHYSICAL THERAPIST

## 2018-04-13 PROCEDURE — 40000185 ZZHC STATISTIC PT OUTPT VISIT: Performed by: PHYSICAL THERAPIST

## 2018-04-18 ENCOUNTER — HOSPITAL ENCOUNTER (OUTPATIENT)
Dept: PHYSICAL THERAPY | Facility: CLINIC | Age: 63
Setting detail: THERAPIES SERIES
End: 2018-04-18
Attending: INTERNAL MEDICINE
Payer: COMMERCIAL

## 2018-04-18 PROCEDURE — 97530 THERAPEUTIC ACTIVITIES: CPT | Mod: GP | Performed by: PHYSICAL THERAPIST

## 2018-04-18 PROCEDURE — 40000185 ZZHC STATISTIC PT OUTPT VISIT: Performed by: PHYSICAL THERAPIST

## 2018-04-18 PROCEDURE — 97110 THERAPEUTIC EXERCISES: CPT | Mod: GP | Performed by: PHYSICAL THERAPIST

## 2018-05-25 ENCOUNTER — HOSPITAL ENCOUNTER (OUTPATIENT)
Dept: PHYSICAL THERAPY | Facility: CLINIC | Age: 63
Setting detail: THERAPIES SERIES
End: 2018-05-25
Attending: INTERNAL MEDICINE
Payer: COMMERCIAL

## 2018-05-25 DIAGNOSIS — M54.42 ACUTE LEFT-SIDED LOW BACK PAIN WITH LEFT-SIDED SCIATICA: Primary | ICD-10-CM

## 2018-05-25 PROCEDURE — 97530 THERAPEUTIC ACTIVITIES: CPT | Mod: GP | Performed by: PHYSICAL THERAPIST

## 2018-05-25 PROCEDURE — 97750 PHYSICAL PERFORMANCE TEST: CPT | Mod: GP | Performed by: PHYSICAL THERAPIST

## 2018-05-25 PROCEDURE — 40000185 ZZHC STATISTIC PT OUTPT VISIT: Performed by: PHYSICAL THERAPIST

## 2018-05-25 PROCEDURE — 97110 THERAPEUTIC EXERCISES: CPT | Mod: GP | Performed by: PHYSICAL THERAPIST

## 2018-05-25 ASSESSMENT — 6 MINUTE WALK TEST (6MWT): TOTAL DISTANCE WALKED (METERS): 437

## 2018-05-25 NOTE — DISCHARGE INSTRUCTIONS
"5/25/18    AC - get it on asap    Sleep- melatonin?    Talk to doctor about SLEEP issues/ Pain    Set timer for sit/stand desk.    Try towel behind back.    Try different beds to see if one is more comfortable.    Try building up pillows to put under knees while sleeping.    Have family help rotate mattress in July    Call HR and ask about ergonomic desk evaluation    Bed exercises: Lying on back: 1) ab squeezes 2) hip squeezes 3) knees apart with theraband  Work sitting exercises: 1) leaning back with thigh against desk 2) \"banana\" - bend to the side with arm overhead 3) (bonus) sit to stand holding ab squeeze  Work standing exercises: 1) ab squeezes 2) pelvic tilts 3) (bonus) twists in chair    Daily: Walk a total of 20min goal  "

## 2018-05-25 NOTE — IP AVS SNAPSHOT
"                  MRN:[id number]                      After Visit Summary   5/25/2018    Remedios Osorio    MRN: [id number]           Visit Information        Provider Department      5/25/2018  8:00 AM Carmen Fish PT [facility], [place], Physical Therapy - Outpatient        Your next 10 appointments already scheduled     Jun 04, 2018  3:15 PM CDT   [facility] with  [facility]   [facility] ([facility])    [address] [place] [address] [address]  [address]  [facility] [id number] [phone]            Jun 04, 2018  3:45 PM CDT   (Arrive by 3:30 PM)   Return Visit with Kanchan Patel MD   [facility] ([facility])    [address] [place] [address]  [address]  [facility] [id number] [phone]                Further instructions from your care team       5/25/18    AC - get it on asap    Sleep- melatonin?    Talk to doctor about SLEEP issues/ Pain    Set timer for sit/stand desk.    Try towel behind back.    Try different beds to see if one is more comfortable.    Try building up pillows to put under knees while sleeping.    Have family help rotate mattress in July    Call HR and ask about ergonomic desk evaluation    Bed exercises: Lying on back: 1) ab squeezes 2) hip squeezes 3) knees apart with theraband  Work sitting exercises: 1) leaning back with thigh against desk 2) \"banana\" - bend to the side with arm overhead 3) (bonus) sit to stand holding ab squeeze  Work standing exercises: 1) ab squeezes 2) pelvic tilts 3) (bonus) twists in chair    Daily: Walk a total of 20min goal    Legal [place] Information     [employer] gives you secure access to your electronic health record. If you see a primary care provider, you can also send messages to your care team and make appointments. If you have questions, please call your primary care clinic.  If you do not have a primary care provider, please call [phone] and they will assist you.   "      Care EveryWhere ID     This is your Care EveryWhere ID. This could be used by other organizations to access your [place] medical records  [id number]        Equal Access to Services     ISABELA KNIGHT : Glenis Ramirez, alexandra yoder, radha peters, paolo lang. So [facility] [phone].    ATENCIÓN: Si habla español, tiene a law disposición servicios gratuitos de asistencia lingüística. Llame al [phone].    We comply with applicable federal civil rights laws and [place] laws. We do not discriminate on the basis of race, color, national origin, age, disability, sex, sexual orientation, or gender identity.

## 2018-05-29 ENCOUNTER — CARE COORDINATION (OUTPATIENT)
Dept: ONCOLOGY | Facility: CLINIC | Age: 63
End: 2018-05-29

## 2018-05-29 ENCOUNTER — MYC MEDICAL ADVICE (OUTPATIENT)
Dept: ONCOLOGY | Facility: CLINIC | Age: 63
End: 2018-05-29

## 2018-05-29 NOTE — PROGRESS NOTES
Call placed to patient and left a voicemail message.  Pt may receive message if she calls into nurse line today. Pt was also given message via My Chart.  Clau Calixto RNCC BSN CBCN          Ethel,     I recommend evaluating Remedios's pain further with an MRI of the L-spine in order to pinpoint the exact cause.  As her NM bone scan in March was negative for cancer, it is unlikely to be cancer related.  This will help to further rule out cancer and decide whether follow up with her PCP or orthopedic surgery is appropriate.  I will put the order in.  Please let her know.  It is okay for her to try benadryl to sleep in the meantime.  It is okay for her to take ibu profen as needed.     Kanchan

## 2018-06-04 ENCOUNTER — ONCOLOGY VISIT (OUTPATIENT)
Dept: ONCOLOGY | Facility: CLINIC | Age: 63
End: 2018-06-04
Attending: INTERNAL MEDICINE
Payer: COMMERCIAL

## 2018-06-04 ENCOUNTER — OFFICE VISIT (OUTPATIENT)
Dept: ORTHOPEDICS | Facility: CLINIC | Age: 63
End: 2018-06-04
Payer: COMMERCIAL

## 2018-06-04 ENCOUNTER — APPOINTMENT (OUTPATIENT)
Dept: LAB | Facility: CLINIC | Age: 63
End: 2018-06-04
Attending: INTERNAL MEDICINE
Payer: COMMERCIAL

## 2018-06-04 VITALS
TEMPERATURE: 99.5 F | HEIGHT: 64 IN | OXYGEN SATURATION: 97 % | HEART RATE: 79 BPM | DIASTOLIC BLOOD PRESSURE: 89 MMHG | RESPIRATION RATE: 18 BRPM | WEIGHT: 218.2 LBS | SYSTOLIC BLOOD PRESSURE: 148 MMHG | BODY MASS INDEX: 37.25 KG/M2

## 2018-06-04 VITALS
HEART RATE: 79 BPM | DIASTOLIC BLOOD PRESSURE: 88 MMHG | SYSTOLIC BLOOD PRESSURE: 148 MMHG | WEIGHT: 218 LBS | HEIGHT: 64 IN | BODY MASS INDEX: 37.22 KG/M2

## 2018-06-04 DIAGNOSIS — Z17.0 MALIGNANT NEOPLASM OF LOWER-OUTER QUADRANT OF LEFT BREAST OF FEMALE, ESTROGEN RECEPTOR POSITIVE (H): Primary | ICD-10-CM

## 2018-06-04 DIAGNOSIS — M65.4 RADIAL STYLOID TENOSYNOVITIS: Primary | ICD-10-CM

## 2018-06-04 DIAGNOSIS — M65.342 TRIGGER RING FINGER OF LEFT HAND: ICD-10-CM

## 2018-06-04 DIAGNOSIS — C50.512 MALIGNANT NEOPLASM OF LOWER-OUTER QUADRANT OF LEFT BREAST OF FEMALE, ESTROGEN RECEPTOR POSITIVE (H): Primary | ICD-10-CM

## 2018-06-04 DIAGNOSIS — E03.2 HYPOTHYROIDISM DUE TO NON-MEDICATION EXOGENOUS SUBSTANCES: ICD-10-CM

## 2018-06-04 DIAGNOSIS — M54.6 ACUTE BILATERAL THORACIC BACK PAIN: ICD-10-CM

## 2018-06-04 DIAGNOSIS — M89.9 DISORDER OF BONE AND CARTILAGE: ICD-10-CM

## 2018-06-04 DIAGNOSIS — M94.9 DISORDER OF BONE AND CARTILAGE: ICD-10-CM

## 2018-06-04 LAB — TSH SERPL DL<=0.005 MIU/L-ACNC: 3.99 MU/L (ref 0.4–4)

## 2018-06-04 PROCEDURE — G0463 HOSPITAL OUTPT CLINIC VISIT: HCPCS | Mod: ZF

## 2018-06-04 PROCEDURE — 84443 ASSAY THYROID STIM HORMONE: CPT | Performed by: INTERNAL MEDICINE

## 2018-06-04 PROCEDURE — 36415 COLL VENOUS BLD VENIPUNCTURE: CPT

## 2018-06-04 PROCEDURE — 99214 OFFICE O/P EST MOD 30 MIN: CPT | Mod: ZP | Performed by: INTERNAL MEDICINE

## 2018-06-04 RX ORDER — LETROZOLE 2.5 MG/1
2.5 TABLET, FILM COATED ORAL DAILY
Qty: 90 TABLET | Refills: 3 | Status: SHIPPED | OUTPATIENT
Start: 2018-06-04 | End: 2019-03-11

## 2018-06-04 ASSESSMENT — PAIN SCALES - GENERAL: PAINLEVEL: NO PAIN (0)

## 2018-06-04 NOTE — PROGRESS NOTES
"[facility] Walk-[facility] Note      Patient is a 62 year old RHD female who presents to the office today for: right wrist/thumb pain and left hand pain.     Right wrist: pain over radial styloid. Worse with use of thumb. No specific injury. No swelling or bruising. Began several months ago.     Left hand: pain in palm over 4th MC head. No injury. Has pain with flexion at mcp, pip of ring finger. Had triggering in thumb previously but not in other fingers.      Denies weakness, numbness, tingling, clicking, locking, or catching.      Past Medical History, Current Medications, and Allergies are reviewed in the electronic medical record as appropriate.     ROS: Pertinent items are noted in HPI.  Constitutional: negative for fevers, chills and malaise  Cardiovascular: negative for dyspnea, fatigue, lower extremity edema  Integument/breast: negative for rash, skin lesion(s) and skin color change  Neurological: negative for paresthesia and weakness      EXAM:/88  Pulse 79  Ht 5' 4\" (1.626 m)  Wt 218 lb (98.9 kg)  LMP 10/28/2009  BMI 37.42 kg/m2    Right wrist: no swelling, erythema, ecchymosis, deformity. TTP over radial styloid. Pain with thumb extension against resistance. Full ROM. finklestein positive. SILT. Cap refill brisk.     Left hand: no swelling, erythema, ecchymosis, deformity. Limited flexion at pip, mcp ring finger secondary to pain, stiffness. No locking or catching noted. ttp over palmar aspect of 4th mc head. No ttp of other metacarpals. Able to flex/extend fingers against resistance without pain or weakness.     Imaging: none      Assessment: Patient is a 62 year old female with:   1) dequervains tenosynovitis of right wrist  2) left hand pain, likely secondary to trigger finger, though no locking present currently    Recommendations:   Reviewed each assessment and treatment options for both with patient in detail.   Discussed splinting, home exercises, and referred to hand " therapy  Discussed steroid injections and offered that these are usually very effective for these conditions, but the patient declines at the current time. Will follow up if she changes her mind.     Felix Lewis MD

## 2018-06-04 NOTE — LETTER
6/4/2018       RE: Remedios Osorio  [address] [id number]     Dear Colleague,    Thank you for referring your patient, Remedios Osorio, to the [facility]. Please see a copy of my visit note below.    MEDICAL ONCOLOGY FOLLOW-UP PATIENT VISIT     NAME: Remedios Osorio     DATE: 6/4/2018    PRIMARY CARE PHYSICIAN: Kae Caal    PATIENT ID: Clinical Stage II-B Breast Cancer    Oncology History: Remedios Osorio is a 62 year old female with h/o stage IIIa, [id number], ER positive, SC positive, HER2 non-amplified invasive ductal carcinoma of the left breast. Her PCP palpated a breast mass in the lower outer left breast in 01/2016. Diagnostic mammogram and ultrasound showed a mass at 4:30, 8 cm from the nipple, measuring 3.3 cm on mammogram and 2.1 cm on ultrasound. She was also found to have multiple abnormal left axillary lymph nodes. The largest one was 2.4 cm. Core needle biopsy of her left breast mass demonstrated invasive ductal carcinoma, grade 3 with extensive tumor necrosis. Axillary LN biopsy was also positive for malignancy. The tumor cells were strongly positive for estrogen receptor (> 95%) and are moderate to strongly positive for progesterone receptor (60-70%). HER2/lizz was non-amplified by FISH.  PET/CT showed the left breast mass, 5 hypermetabolic left axillary lymph nodes, indeterminate left cervical chain lymph nodes, and an indeterminate hypodensity in the dome of the liver. She received 11 cycles of weekly Taxol, the 12th was cancelled due to neuropathy. She received one cycle of ddAC but did not tolerate this well due to fatigue and generalized weakness. She declined any further chemotherapy.    Left breast lumpectomy and left axillary lymph node dissection was performed by Dr. Amezcua on 6/29/16.  Pathology showed a residual 1.6 cm grade 2, IDC in the left breast.  Surgical margins were negative.  Invasive tumor cellularity of 30%.  Tumor infiltrating  lymphocytes were estimated at 50%.  6/ excised lymph nodes were involved with malignancy.  The largest lymph node metastasis measured 3.9 cm and had a 1 mm area of extranodal extension.  Minimal treatment related changes were noted in the lymph nodes.  Pathologic staging was [id number], or stage IIIa.  [facility] residual cancer burden was Class III.  Unfortunately she was not eligible for ARIADNA due to having received only 13 weeks of neoadjuvant chemotherapy.  Adjuvant Xeloda was recommended, she declined.  She completed radiation on 10/17/16.  She has been on letrozole since early 2016.    Interim History:    Ms. Osorio comes into clinic today for routine breast cancer followup.  She continues on treatment with letrozole.  Most bothersome to her at this time are diffuse joint aches and pains.  She has very bad low back pain.  It radiates to either side.  It is so bad that it is preventing her from sleeping at night.  She cannot lie on her back.  It also bothers her after mowing the lawn.  It is otherwise not bothersome during the day.  She hates taking medicine; however, has been taking intermittent Tylenol PM.  She states the medicine is  2 years and has not been causing significant relief.  She also has pain in her bilateral knees as well as her feet.  She reports pain and swelling over her right wrist and inability to straighten the ring finger of her left hand.  She has intermittent hot flashes.  She denies vaginal dryness.  She has no recent symptoms of depression or anxiety.  She has had quite a bit of stress at work.  She states she has been eating too many almond crackers and she has gained 6 pounds since her last visit 3 months ago.  She is trying to adhere to the ketogenic diet as much as she is able.  She has some pain in her left lateral chest wall when she applies pressure to the area.  She denies breast lumps or masses.  She has no cough, shortness of breath or chest pain.  She gets  intermittent migraines that she does not take medicine for and they resolve on their own.  She has no visual changes or focal neurologic complaints.  The remainder of a complete 12-point review of systems is otherwise negative.     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       PAST SURGICAL HISTORY:  Past Surgical History:   Procedure Laterality Date     CL AFF SURGICAL PATHOLOGY  2005    UTERINE LEIOMYOMA  [D25.9]     HC BIOPSY/EXCIS CERVICAL LESION W/WO FULGURATION  08-15-00,12-    endocervical polypectomy     HC TOOTH EXTRACTION W/FORCEP      wisdom teeth     LUMPECTOMY BREAST WITH SENTINEL NODE, COMBINED Left 6/29/2016    Segmental mastectomy with axillary lymph node dissection     REMOVE PORT VASCULAR ACCESS Right 6/29/2016    Procedure: REMOVE PORT VASCULAR ACCESS;  Surgeon: Gianna Amezcua MD;  Location:  OR     MEDS:  Current Outpatient Prescriptions   Medication     Calcium Citrate-Vitamin D (CALCIUM + D PO)     IODINE, KELP, PO     letrozole (FEMARA) 2.5 MG tablet     order for DME     order for DME     order for DME     order for DME     No current facility-administered medications for this visit.      Facility-Administered Medications Ordered in Other Visits   Medication     lidocaine BUFFERED 1 % solution 10 mL       ALLERGIES:  Allergies   Allergen Reactions     Benadryl [Diphenhydramine] Other (See Comments)     Pt had severe hypotension, nausea and vasovagal type reaction to IV Benadryl.   Give PO only.      Cats      Keflex [Cephalexin]      rash        PHYSICAL EXAM:  /89  Pulse 79  Temp 99.5  F  "(37.5  C) (Oral)  Resp 18  Ht 1.626 m (5' 4.02\")  Wt 99 kg (218 lb 3.2 oz)  LMP 10/28/2009  SpO2 97%  BMI 37.44 kg/m2  LMP 10/28/2009  General:  Well appearing, obese adult female in NAD.  HEENT:  Normocephalic.  Sclera anicteric.  MMM.  No lesions of the oropharynx.  Lymph:  No palpable cervical, supraclavicular, or axillary LAD.  Chest:  CTA bilaterally.  No wheezes or crackles.  CV:  RRR.  Nl S1 and S2.  No m/r/g.  Breast:  Bilateral breasts are of normal fibroglandular density.  There are no discretely palpable masses in either breast.  Bilateral nipples are everted.  No nipple discharge.  Abd:  Soft/NT/ND.  BSs normoactive.  No hepatosplenomegaly.  Ext:  No pitting edema of the bilateral lower extremities.  Pulses 2+ and symmetric.  Musculo:  Strength 5/5 throughout.  No notable lymphedema of the LUE or left chest wall.  There is focal tenderness left lateral chest wall in area of patient reported pain.  No tenderness with palpation of the spine.  Neuro:  Cranial nerves grossly intact.  Gait stable.  Psych:  Mood and affect appear normal.    LABS REVIEWED THIS VISIT:  3/9/18 NM Bone Scan:  Degenerative changes in the bilateral acromioclavicular, bilateral knees and bilateral feet.  Radiotracer uptake in the lower lumbar spine is favored to be due to facet degenerative changes.    TSH wnl at 3.99 (decreased from prior)    IMPRESSION/PLAN: Remedios Osorio is a 62 year old female with stage IIIa, [id number], ER/NV positive, HER2 negative left breast cancer. She is s/p 11 weeks of weekly taxol, 1 cycle of ddAC, left breast lumpectomy, ALND, and adjuvant radiation.  She has been on letrozole since early September, 2016.      1.  Stage III breast cancer:  Remedios is just shy of 2 years out from excision of a left sided breast cancer.  She continues on letrozole.  She is tolerating the medication well with the exception of arthralgas.  Despite this side effect, she is willing to continue taking it.  We plan to continue " letrozole to complete a total of 10 years of endocrine therapy (through 09/2026).  There is no evidence of disease recurrence on clinical breast exam performed today.  I will see her back in 4 months for her next visit.  Next mammogram will be due in 02/2019.    2.  Mid-low back pain:  NM bone scan in 03/2018 was without bone metastases.  Given severity of pain, recommend MRI T- and L-spine at this time.  Recommended she take ibu profen PM for back pain that is preventing her from sleeping.    3.  Subclinical hypothyroidism:  TSH now wnl.  She states per my recommendation she has stopped taking exogenous iodine.    4.  Bone Health:  DEXA bone density scan performed in 10/2016 showed a lowest T-score of -0.5.  Continue calcium 1200 mg and vitamin D 1000 IU PO daily.  Will plan to repeat a DEXA at the time of next visit.    5.  Right wrist pain and swelling:  Advised to go to the orthopaedic walk-in clinic on the 4th floor following our visit today.    6.  Lymphedema:  Little to no lymphedema noted on exam today.  Continue to wear compression sleeve prn.      7.  Follow Up:  MRI T- and L- spine within 1-2 weeks.  Return to clinic in approximately 4 months for labs, DEXA bone density scan, and visit with me.      Again, thank you for allowing me to participate in the care of your patient.      Sincerely,    Kanchan Patel MD

## 2018-06-04 NOTE — MR AVS SNAPSHOT
After Visit Summary   6/4/2018    Remedios Osorio    MRN: [id number]           Patient Information     Date Of Birth          1955        Visit Information        Provider Department      6/4/2018 3:45 PM Kanchan Patel MD [facility]        Today's Diagnoses     Malignant neoplasm of lower-outer quadrant of left breast of female, estrogen receptor positive (H)    -  1    Hypothyroidism due to non-medication exogenous substances        Disorder of bone and cartilage        Acute bilateral thoracic back pain           Follow-ups after your visit        Your next 10 appointments already scheduled     Mukul 15, 2018  8:00 AM CDT   Cancer Rehab Treatment with Carmen Fish, PT   [facility], [place], Physical Therapy - Outpatient ([facility])    [address], [address]  [place] [address]   [phone]            Jun 20, 2018  8:00 AM CDT   Cancer Rehab Treatment with Carmen Fish, PT   [facility], [place], Physical Therapy - Outpatient ([facility])    [address], [address]  [place] [address]   [phone]            Jun 29, 2018  8:00 AM CDT   Cancer Rehab Treatment with Carmen Fish, PT   [facility], [place], Physical Therapy - Outpatient ([facility])    [address], [address]  [place] [address]   [phone]              Who to contact     If you have questions or need follow up information about today's clinic visit or your schedule please contact [facility] directly at [phone].  Normal or non-critical lab and imaging results will be communicated to you by MyChart, letter or phone within 4 business days after the clinic has received the results. If you do not hear from us within 7 days, please contact the clinic through MyChart or phone. If you have a critical or abnormal lab result, we will  "notify you by phone as soon as possible.  Submit refill requests through [employer] or call your pharmacy and they will forward the refill request to us. Please allow 3 business days for your refill to be completed.          Additional Information About Your Visit        SellStagehart Information     [employer] gives you secure access to your electronic health record. If you see a primary care provider, you can also send messages to your care team and make appointments. If you have questions, please call your primary care clinic.  If you do not have a primary care provider, please call [phone] and they will assist you.        Care EveryWhere ID     This is your Care EveryWhere ID. This could be used by other organizations to access your [facility] medical records  [id number]        Your Vitals Were     Pulse Temperature Respirations Height Last Period Pulse Oximetry    79 99.5  F (37.5  C) (Oral) 18 1.626 m (5' 4.02\") 10/28/2009 97%    BMI (Body Mass Index)                   37.44 kg/m2            Blood Pressure from Last 3 Encounters:   06/04/18 148/88   06/04/18 148/89   02/26/18 142/67    Weight from Last 3 Encounters:   06/04/18 98.9 kg (218 lb)   06/04/18 99 kg (218 lb 3.2 oz)   02/26/18 96.4 kg (212 lb 9.6 oz)              We Performed the Following     TSH with free T4 reflex          Where to get your medicines      These medications were sent to [facility] [place] [id number] [facility] [address]  [address] [address]    Hours:  TRANSPLANT PHONE NUMBER [phone] Phone:  [phone]     letrozole 2.5 MG tablet          Primary Care Provider Office Phone # Fax #    [facility] [phone]       [facility] [place] [facility] [address] [facility] [address]  [facility] [address]        Equal Access to Services     ISABELA KNIGHT AH: Hadii meredith michelo Somily, waaxda luqadaha, qaybta kaalmada adeegyada, paolo house " jose smith parisjean claude sotoaarenetta ah. So [facility] [phone].    ATENCIÓN: Si maryla tom, tiene a law disposición servicios gratuitos de asistencia lingüística. Genna burnett [phone].    We comply with applicable federal civil rights laws and [place] laws. We do not discriminate on the basis of race, color, national origin, age, disability, sex, sexual orientation, or gender identity.            Thank you!     Thank you for choosing [facility]  for your care. Our goal is always to provide you with excellent care. Hearing back from our patients is one way we can continue to improve our services. Please take a few minutes to complete the written survey that you may receive in the mail after your visit with us. Thank you!             Your Updated Medication List - Protect others around you: Learn how to safely use, store and throw away your medicines at www.disposemymeds.org.          This list is accurate as of 6/4/18 11:59 PM.  Always use your most recent med list.                   Brand Name Dispense Instructions for use Diagnosis    CALCIUM + D PO           IODINE (KELP) PO           letrozole 2.5 MG tablet    FEMARA    90 tablet    Take 1 tablet (2.5 mg) by mouth daily    Malignant neoplasm of lower-outer quadrant of left breast of female, estrogen receptor positive (H)       * order for DME     1 each    Equipment being ordered: Sit Stand Desk    Malignant neoplasm of female breast, unspecified laterality, unspecified site of breast       * order for DME     1 Device    Equipment being ordered: Compression sleeve    Lymphedema of left arm       * order for DME     1 each    Equipment being ordered: Compression sleeve/gauntlet 20-40 mmHG    Lymphedema       * order for DME     1 each    Equipment being ordered: compression bra    Lymphedema       * Notice:  This list has 4 medication(s) that are the same as other medications prescribed for you. Read the directions carefully, and ask your doctor or  other care provider to review them with you.

## 2018-06-04 NOTE — NURSING NOTE
"Oncology Rooming Note    June 4, 2018 3:32 PM   Remedios Osorio is a 62 year old female who presents for:    Chief Complaint   Patient presents with     Blood Draw     Labs drawn from Rt arm in lab by [facility]     Oncology Clinic Visit     return - brease ca      Initial Vitals: /89  Pulse 79  Temp 99.5  F (37.5  C) (Oral)  Resp 18  Ht 1.626 m (5' 4.02\")  Wt 99 kg (218 lb 3.2 oz)  LMP 10/28/2009  SpO2 97%  BMI 37.44 kg/m2 Estimated body mass index is 37.44 kg/(m^2) as calculated from the following:    Height as of this encounter: 1.626 m (5' 4.02\").    Weight as of this encounter: 99 kg (218 lb 3.2 oz). Body surface area is 2.11 meters squared.  No Pain (0) Comment: Data Unavailable   Patient's last menstrual period was 10/28/2009.  Allergies reviewed: Yes  Medications reviewed: Yes    Medications: need refill of Letrozole   Pharmacy name entered into [email or web address]:    [facility] - [place], [place] - [address]  [id number] - [place], [place] - [address]  [facility] - [place], [place] - [address] [facility] [place] - [address] [facility] [place], [place] [address]  [facility] [place] [place] - [address]  [facility] - [place], [place] - [address]    Clinical concerns: no new concerns     6 minutes for nursing intake (face to face time)     Hedy Garcia CMA            "

## 2018-06-04 NOTE — NURSING NOTE
Chief Complaint   Patient presents with     Blood Draw     Labs drawn from Rt arm in lab by CMA      Pt tolerated well  Dana Infante CMA

## 2018-06-04 NOTE — MR AVS SNAPSHOT
After Visit Summary   6/4/2018    Remedios Osorio    MRN: [id number]           Patient Information     Date Of Birth          1955        Visit Information        Provider Department      6/4/2018 4:30 PM Felix Lewis MD [facility]opa[facility] Walk [facility]        Today's Diagnoses     Radial styloid tenosynovitis    -  1    Trigger ring finger of left hand           Follow-ups after your visit        Additional Services     HAND THERAPY       Hand Therapy Referral                  Your next 10 appointments already scheduled     Mukul 15, 2018  8:00 AM CDT   Cancer Rehab Treatment with Carmen Fish, PT   [facility], [place], Physical Therapy - Outpatient ([facility])    [address]  [place] [address]   [phone]            Jun 20, 2018  8:00 AM CDT   Cancer Rehab Treatment with Carmen Fish, PT   [facility], [place], Physical Therapy - Outpatient ([facility])    [address]  [place] [address]   [phone]            Jun 29, 2018  8:00 AM CDT   Cancer Rehab Treatment with Carmen Fish, PT   [facility], [place], Physical Therapy - Outpatient ([facility])    [address]  [place] [address]   [phone]              Who to contact     Please call your clinic at [phone] to:    Ask questions about your health    Make or cancel appointments    Discuss your medicines    Learn about your test results    Speak to your doctor            Additional Information About Your Visit        MyChart Information     [employer] gives you secure access to your electronic health record. If you see a primary care provider, you can also send messages to your care team and make appointments. If you have questions, please call your primary care clinic.  If you do not have a primary care provider, please call  "[phone] and they will assist you.      [employer] is an electronic gateway that provides easy, online access to your medical records. With [employer], you can request a clinic appointment, read your test results, renew a prescription or communicate with your care team.     To access your existing account, please contact your [facility] or call [phone] for assistance.        Care EveryWhere ID     This is your Care EveryWhere ID. This could be used by other organizations to access your [facility] medical records  [id number]        Your Vitals Were     Pulse Height Last Period BMI (Body Mass Index)          79 5' 4\" (1.626 m) 10/28/2009 37.42 kg/m2         Blood Pressure from Last 3 Encounters:   06/04/18 148/88   06/04/18 148/89   02/26/18 142/67    Weight from Last 3 Encounters:   06/04/18 218 lb (98.9 kg)   06/04/18 218 lb 3.2 oz (99 kg)   02/26/18 212 lb 9.6 oz (96.4 kg)              We Performed the Following     HAND THERAPY          Where to get your medicines      These medications were sent to [facility] [place], [place] - [address]  [address], [facility] [address]    Hours:  TRANSPLANT PHONE NUMBER [phone] Phone:  [phone]     letrozole 2.5 MG tablet          Primary Care Provider Office Phone # Fax #    [facility] [phone]       [place] [address]  [address] [address]        Equal Access to Services     ISABELA KNIGHT AH: Hadii meredith hatfield hadasho Soomaali, waaxda luqadaha, qaybta kaalmada fran, paolo lang. So [facility] [phone].    ATENCIÓN: Si habla español, tiene a law disposición servicios gratuitos de asistencia lingüística. Llame al [phone].    We comply with applicable federal civil rights laws and [place] laws. We do not discriminate on the basis of race, color, national origin, " age, disability, sex, sexual orientation, or gender identity.            Thank you!     Thank you for choosing [facility] SPORTS AND ORTHOPAEDIC WALK IN CLINIC  for your care. Our goal is always to provide you with excellent care. Hearing back from our patients is one way we can continue to improve our services. Please take a few minutes to complete the written survey that you may receive in the mail after your visit with us. Thank you!             Your Updated Medication List - Protect others around you: Learn how to safely use, store and throw away your medicines at www.disposemymeds.org.          This list is accurate as of 6/4/18 11:59 PM.  Always use your most recent med list.                   Brand Name Dispense Instructions for use Diagnosis    CALCIUM + D PO           IODINE (KELP) PO           letrozole 2.5 MG tablet    FEMARA    90 tablet    Take 1 tablet (2.5 mg) by mouth daily    Malignant neoplasm of lower-outer quadrant of left breast of female, estrogen receptor positive (H)       * order for DME     1 each    Equipment being ordered: Sit Stand Desk    Malignant neoplasm of female breast, unspecified laterality, unspecified site of breast       * order for DME     1 Device    Equipment being ordered: Compression sleeve    Lymphedema of left arm       * order for DME     1 each    Equipment being ordered: Compression sleeve/gauntlet 20-40 mmHG    Lymphedema       * order for DME     1 each    Equipment being ordered: compression bra    Lymphedema       * Notice:  This list has 4 medication(s) that are the same as other medications prescribed for you. Read the directions carefully, and ask your doctor or other care provider to review them with you.

## 2018-06-04 NOTE — PROGRESS NOTES
MEDICAL ONCOLOGY FOLLOW-UP PATIENT VISIT     NAME: Remedios Osorio     DATE: 6/4/2018    PRIMARY CARE PHYSICIAN: Kae Caal    PATIENT ID: Clinical Stage II-B Breast Cancer    Oncology History: Remedios Osorio is a 62 year old female with h/o stage IIIa, [id number], ER positive, TN positive, HER2 non-amplified invasive ductal carcinoma of the left breast. Her PCP palpated a breast mass in the lower outer left breast in 01/2016. Diagnostic mammogram and ultrasound showed a mass at 4:30, 8 cm from the nipple, measuring 3.3 cm on mammogram and 2.1 cm on ultrasound. She was also found to have multiple abnormal left axillary lymph nodes. The largest one was 2.4 cm. Core needle biopsy of her left breast mass demonstrated invasive ductal carcinoma, grade 3 with extensive tumor necrosis. Axillary LN biopsy was also positive for malignancy. The tumor cells were strongly positive for estrogen receptor (> 95%) and are moderate to strongly positive for progesterone receptor (60-70%). HER2/lizz was non-amplified by FISH.  PET/CT showed the left breast mass, 5 hypermetabolic left axillary lymph nodes, indeterminate left cervical chain lymph nodes, and an indeterminate hypodensity in the dome of the liver. She received 11 cycles of weekly Taxol, the 12th was cancelled due to neuropathy. She received one cycle of ddAC but did not tolerate this well due to fatigue and generalized weakness. She declined any further chemotherapy.    Left breast lumpectomy and left axillary lymph node dissection was performed by Dr. Amezcua on 6/29/16.  Pathology showed a residual 1.6 cm grade 2, IDC in the left breast.  Surgical margins were negative.  Invasive tumor cellularity of 30%.  Tumor infiltrating lymphocytes were estimated at 50%.  6/19 excised lymph nodes were involved with malignancy.  The largest lymph node metastasis measured 3.9 cm and had a 1 mm area of extranodal extension.  Minimal treatment related changes were noted in the  lymph nodes.  Pathologic staging was [id number], or stage IIIa.  MD José Miguel residual cancer burden was Class III.  Unfortunately she was not eligible for ARIADNA due to having received only 13 weeks of neoadjuvant chemotherapy.  Adjuvant Xeloda was recommended, she declined.  She completed radiation on 10/17/16.  She has been on letrozole since early 2016.    Interim History:    Ms. Osorio comes into clinic today for routine breast cancer followup.  She continues on treatment with letrozole.  Most bothersome to her at this time are diffuse joint aches and pains.  She has very bad low back pain.  It radiates to either side.  It is so bad that it is preventing her from sleeping at night.  She cannot lie on her back.  It also bothers her after mowing the lawn.  It is otherwise not bothersome during the day.  She hates taking medicine; however, has been taking intermittent Tylenol PM.  She states the medicine is  2 years and has not been causing significant relief.  She also has pain in her bilateral knees as well as her feet.  She reports pain and swelling over her right wrist and inability to straighten the ring finger of her left hand.  She has intermittent hot flashes.  She denies vaginal dryness.  She has no recent symptoms of depression or anxiety.  She has had quite a bit of stress at work.  She states she has been eating too many almond crackers and she has gained 6 pounds since her last visit 3 months ago.  She is trying to adhere to the ketogenic diet as much as she is able.  She has some pain in her left lateral chest wall when she applies pressure to the area.  She denies breast lumps or masses.  She has no cough, shortness of breath or chest pain.  She gets intermittent migraines that she does not take medicine for and they resolve on their own.  She has no visual changes or focal neurologic complaints.  The remainder of a complete 12-point review of systems is otherwise negative.     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       PAST SURGICAL HISTORY:  Past Surgical History:   Procedure Laterality Date     CL AFF SURGICAL PATHOLOGY  2005    UTERINE LEIOMYOMA  [D25.9]     HC BIOPSY/EXCIS CERVICAL LESION W/WO FULGURATION  08-15-00,12-    endocervical polypectomy     HC TOOTH EXTRACTION W/FORCEP      wisdom teeth     LUMPECTOMY BREAST WITH SENTINEL NODE, COMBINED Left 6/29/2016    Segmental mastectomy with axillary lymph node dissection     REMOVE PORT VASCULAR ACCESS Right 6/29/2016    Procedure: REMOVE PORT VASCULAR ACCESS;  Surgeon: Gianna Amezcua MD;  Location:  OR     MEDS:  Current Outpatient Prescriptions   Medication     Calcium Citrate-Vitamin D (CALCIUM + D PO)     IODINE, KELP, PO     letrozole (FEMARA) 2.5 MG tablet     order for DME     order for DME     order for DME     order for DME     No current facility-administered medications for this visit.      Facility-Administered Medications Ordered in Other Visits   Medication     lidocaine BUFFERED 1 % solution 10 mL       ALLERGIES:  Allergies   Allergen Reactions     Benadryl [Diphenhydramine] Other (See Comments)     Pt had severe hypotension, nausea and vasovagal type reaction to IV Benadryl.   Give PO only.      Cats      Keflex [Cephalexin]      rash        PHYSICAL EXAM:  /89  Pulse 79  Temp 99.5  F (37.5  C) (Oral)  Resp 18  Ht 1.626 m (5' 4.02\")  Wt 99 kg (218 lb 3.2 oz)  LMP 10/28/2009  SpO2 97%  BMI 37.44 kg/m2  LMP 10/28/2009  General:  Well appearing, obese adult female in NAD.  HEENT:  Normocephalic.  Sclera anicteric.  MMM.  No " lesions of the oropharynx.  Lymph:  No palpable cervical, supraclavicular, or axillary LAD.  Chest:  CTA bilaterally.  No wheezes or crackles.  CV:  RRR.  Nl S1 and S2.  No m/r/g.  Breast:  Bilateral breasts are of normal fibroglandular density.  There are no discretely palpable masses in either breast.  Bilateral nipples are everted.  No nipple discharge.  Abd:  Soft/NT/ND.  BSs normoactive.  No hepatosplenomegaly.  Ext:  No pitting edema of the bilateral lower extremities.  Pulses 2+ and symmetric.  Musculo:  Strength 5/5 throughout.  No notable lymphedema of the LUE or left chest wall.  There is focal tenderness left lateral chest wall in area of patient reported pain.  No tenderness with palpation of the spine.  Neuro:  Cranial nerves grossly intact.  Gait stable.  Psych:  Mood and affect appear normal.    LABS REVIEWED THIS VISIT:  3/9/18 NM Bone Scan:  Degenerative changes in the bilateral acromioclavicular, bilateral knees and bilateral feet.  Radiotracer uptake in the lower lumbar spine is favored to be due to facet degenerative changes.    TSH wnl at 3.99 (decreased from prior)    IMPRESSION/PLAN: Remedios Osorio is a 62 year old female with stage IIIa, [id number], ER/ME positive, HER2 negative left breast cancer. She is s/p 11 weeks of weekly taxol, 1 cycle of ddAC, left breast lumpectomy, ALND, and adjuvant radiation.  She has been on letrozole since early September, 2016.      1.  Stage III breast cancer:  Remedios is just shy of 2 years out from excision of a left sided breast cancer.  She continues on letrozole.  She is tolerating the medication well with the exception of arthralgas.  Despite this side effect, she is willing to continue taking it.  We plan to continue letrozole to complete a total of 10 years of endocrine therapy (through 09/2026).  There is no evidence of disease recurrence on clinical breast exam performed today.  I will see her back in 4 months for her next visit.  Next mammogram will be due  in 02/2019.    2.  Mid-low back pain:  NM bone scan in 03/2018 was without bone metastases.  Given severity of pain, recommend MRI T- and L-spine at this time.  Recommended she take ibu profen PM for back pain that is preventing her from sleeping.    3.  Subclinical hypothyroidism:  TSH now wnl.  She states per my recommendation she has stopped taking exogenous iodine.    4.  Bone Health:  DEXA bone density scan performed in 10/2016 showed a lowest T-score of -0.5.  Continue calcium 1200 mg and vitamin D 1000 IU PO daily.  Will plan to repeat a DEXA at the time of next visit.    5.  Right wrist pain and swelling:  Advised to go to the orthopaedic walk-in clinic on the 4th floor following our visit today.    6.  Lymphedema:  Little to no lymphedema noted on exam today.  Continue to wear compression sleeve prn.      7.  Follow Up:  MRI T- and L- spine within 1-2 weeks.  Return to clinic in approximately 4 months for labs, DEXA bone density scan, and visit with me.

## 2018-06-04 NOTE — PROGRESS NOTES
SPORTS & ORTHOPEDIC WALK-IN VISIT 6/4/2018    Primary Care Physician:  FROY  R wrist - Jan 2017 started having pain. No injury.  L hand- No injury. Pain across MTC heads. Locking in hand. Ring finger  Carries a heavy bag  Reason for visit:     What part of your body is injured / painful?  right wrist and L hand    What caused the injury /pain? No inciting event     How long ago did your injury occur or pain begin? several months ago    What are your most bothersome symptoms? Pain    How would you characterize your symptom?  aching and sharp    What makes your symptoms better? Rest    What makes your symptoms worse? Movement    Have you been previously seen for this problem? No    Medical History:    Any recent changes to your medical history? No    Any new medication prescribed since last visit? No    Have you had surgery on this body part before? No    Social History:    Occupation: Admin. Assistant     Handedness: Right    Exercise: None    Review of Systems:    Do you have fever, chills, weight loss? No    Do you have any vision problems? No    Do you have any chest pain or edema? No    Do you have any shortness of breath or wheezing?  No    Do you have stomach problems? No    Do you have any numbness or focal weakness? No    Do you have diabetes? No    Do you have problems with bleeding or clotting? No    Do you have an rashes or other skin lesions? No

## 2018-07-30 NOTE — PROGRESS NOTES
Outpatient Physical Therapy Discharge Note     Patient: Remedios Osorio  : 1955    Beginning/End Dates of Reporting Period:  2016 to 12/15/2017    Referring Provider: Dr. Kanchan Patel    Therapy Diagnosis: left UE lymphedema/decreased left shoulder ROM     Client Self Report:      Objective Measurements:  Objective Measure: measurements  Details: right UE 2280mL, left UE 2222 mL, right > left 2.6% (upper arm left > right 7.7%). Left UE decreased 14% from SOC 16                    Goals:  Goal Identifier HOME PROGRAM   Goal Description patient to demonstrate understanding of home program to prevent left UE swelling, and increase left shoulder ROM.    Target Date 18   Date Met      Progress:unable to assess     Goal Identifier    LEFT SHOULDER ROM    Goal Description left shoulder ROM to increase 20-30 degrees for patient to be able to get into position for radiation treatment and increase tolerance to functional activities.    Target Date 18   Date Met      Progress:unable to assess     Goal Identifier SPADI   Goal Description SPADI to show a 4+ increase to show improvement in pain and activity level.    Target Date 18   Date Met      Progress:unable to assess         Progress Toward Goals:   Progress this reporting period:  Patient seen for 19 visits with instruction in home program which included: compression sleeve/gauntlet, self manual lymphatic drainage, lymphatic exercises, ROM/strengthing exercises, and skin care. Patient initially seen from 2016 to 2016 for 13 visits, and for 3 visits following radiation treatment from 2016 to 2017. Patient seen for 2 follow up appointments 2017 indicating interest in cancer rehab program but no further appt made at that time. Patient seen 12/15/17 due to poor fit of compression sleeve, recommending return to fitter for remeasure for garment and recommending compression bra. Recommending patient return for follow up appt to  assess home program and fit of sleeve. Patient did not schedule further appt. No further treatment, discharge.       Plan:  Discharge from therapy.    Discharge:    Reason for Discharge: Patient has failed to schedule further appointments.    Equipment Issued: compression sleeve/gauntlet    Discharge Plan: Patient to continue home program.

## 2018-07-30 NOTE — ADDENDUM NOTE
Encounter addended by: Ira You, PT on: 7/30/2018  9:32 AM<BR>     Actions taken: Episode resolved, Sign clinical note

## 2018-10-08 ENCOUNTER — ONCOLOGY VISIT (OUTPATIENT)
Dept: ONCOLOGY | Facility: CLINIC | Age: 63
End: 2018-10-08
Attending: INTERNAL MEDICINE
Payer: COMMERCIAL

## 2018-10-08 ENCOUNTER — APPOINTMENT (OUTPATIENT)
Dept: LAB | Facility: CLINIC | Age: 63
End: 2018-10-08
Attending: INTERNAL MEDICINE
Payer: COMMERCIAL

## 2018-10-08 ENCOUNTER — RADIANT APPOINTMENT (OUTPATIENT)
Dept: BONE DENSITY | Facility: CLINIC | Age: 63
End: 2018-10-08
Attending: INTERNAL MEDICINE
Payer: COMMERCIAL

## 2018-10-08 VITALS
RESPIRATION RATE: 16 BRPM | DIASTOLIC BLOOD PRESSURE: 84 MMHG | WEIGHT: 217.6 LBS | BODY MASS INDEX: 37.35 KG/M2 | HEART RATE: 97 BPM | OXYGEN SATURATION: 100 % | TEMPERATURE: 98 F | SYSTOLIC BLOOD PRESSURE: 123 MMHG

## 2018-10-08 DIAGNOSIS — Z17.0 MALIGNANT NEOPLASM OF LOWER-OUTER QUADRANT OF LEFT BREAST OF FEMALE, ESTROGEN RECEPTOR POSITIVE (H): Primary | ICD-10-CM

## 2018-10-08 DIAGNOSIS — C50.512 MALIGNANT NEOPLASM OF LOWER-OUTER QUADRANT OF LEFT BREAST OF FEMALE, ESTROGEN RECEPTOR POSITIVE (H): Primary | ICD-10-CM

## 2018-10-08 DIAGNOSIS — M94.9 DISORDER OF BONE AND CARTILAGE: ICD-10-CM

## 2018-10-08 DIAGNOSIS — M89.9 DISORDER OF BONE AND CARTILAGE: ICD-10-CM

## 2018-10-08 PROCEDURE — 99214 OFFICE O/P EST MOD 30 MIN: CPT | Mod: ZP | Performed by: INTERNAL MEDICINE

## 2018-10-08 PROCEDURE — 36415 COLL VENOUS BLD VENIPUNCTURE: CPT

## 2018-10-08 PROCEDURE — G0463 HOSPITAL OUTPT CLINIC VISIT: HCPCS | Mod: ZF

## 2018-10-08 ASSESSMENT — PAIN SCALES - GENERAL: PAINLEVEL: NO PAIN (1)

## 2018-10-08 NOTE — PROGRESS NOTES
MEDICAL ONCOLOGY FOLLOW-UP PATIENT VISIT     NAME: Remedios Osorio     DATE: 10/8/2018    PRIMARY CARE PHYSICIAN: Kae Caal    PATIENT ID: Clinical Stage II-B Breast Cancer    Oncology History: Remedios Osorio is a 62 year old female with h/o stage IIIa, [id number], ER positive, NH positive, HER2 non-amplified invasive ductal carcinoma of the left breast. Her PCP palpated a breast mass in the lower outer left breast in 01/2016. Diagnostic mammogram and ultrasound showed a mass at 4:30, 8 cm from the nipple, measuring 3.3 cm on mammogram and 2.1 cm on ultrasound. She was also found to have multiple abnormal left axillary lymph nodes. The largest one was 2.4 cm. Core needle biopsy of her left breast mass demonstrated invasive ductal carcinoma, grade 3 with extensive tumor necrosis. Axillary LN biopsy was also positive for malignancy. The tumor cells were strongly positive for estrogen receptor (> 95%) and are moderate to strongly positive for progesterone receptor (60-70%). HER2/lizz was non-amplified by FISH.  PET/CT showed the left breast mass, 5 hypermetabolic left axillary lymph nodes, indeterminate left cervical chain lymph nodes, and an indeterminate hypodensity in the dome of the liver. She received 11 cycles of weekly Taxol, the 12th was cancelled due to neuropathy. She received one cycle of ddAC but did not tolerate this well due to fatigue and generalized weakness. She declined any further chemotherapy.    Left breast lumpectomy and left axillary lymph node dissection was performed by Dr. Amezcua on 6/29/16.  Pathology showed a residual 1.6 cm grade 2, IDC in the left breast.  Surgical margins were negative.  Invasive tumor cellularity of 30%.  Tumor infiltrating lymphocytes were estimated at 50%.  6/19 excised lymph nodes were involved with malignancy.  The largest lymph node metastasis measured 3.9 cm and had a 1 mm area of extranodal extension.  Minimal treatment related changes were noted in the  lymph nodes.  Pathologic staging was [id number], or stage IIIa.  [facility] residual cancer burden was Class III.  Unfortunately she was not eligible for ARIADNA due to having received only 13 weeks of neoadjuvant chemotherapy.  Adjuvant Xeloda was recommended, she declined.  She completed radiation on 10/17/16.  She has been on letrozole since early 09/2016.    Interim History:   Ms. Osorio comes in to clinic today for routine breast cancer followup.  She continues on treatment with letrozole.  She feels that she is under a great amount of stress.  She has had a lot of stress from her boss at work.  In addition, one of her 2 dogs passed away 3 weeks ago.  She is still adjusting to the loss.  She reports symptoms of depression in that she has had increased tearfulness.  She has been eating more than usual and has not been able to sleep well.  She denies new bone or joint aches or pains.  She continues to have low to mid back pain.  She states that since last visit, she has been using hemp oil with some relief and feels the pain is less.  She denies new pains.  She has no cough, shortness of breath or chest pain.  No current abdominal complaints.  She denies headaches, visual changes or focal neurologic complaints.  She has had no fevers or chills.  She reports left chest wall pain which is intermittent and is not bothersome enough for her to take pain medicine.  She also has intermittent hot flashes, but again declines medication for them.  The same is true for her vaginal dryness.  The remainder of a complete 12-point review of systems is completed and was negative with the exception of that mentioned above.       PAST MEDICAL HISTORY:   Past Medical History:   Diagnosis Date     ABNL LIVER FUN STUDY  W/ MONO  5/01     Breast cancer (H)      CHR BLOOD LOSS ANEMIA DUE TO FIBROIDS 6/9/2005     ELEVATED HBA1C 6.2% 6/05 8/6/2005     HX MUCOUS POLYPS OF CERVIX  2000     HYPERLIPIDEMIA       Infectious mononucleosis 5/01      MENORRHAGIA      MIGRAINE HEADACHES      MORBID OBESITY BMI 44.79 6/05 6/12/2005     UTERINE LEIOMYOMA  2/7/2003       PAST SURGICAL HISTORY:  Past Surgical History:   Procedure Laterality Date     CL AFF SURGICAL PATHOLOGY  2005    UTERINE LEIOMYOMA  [D25.9]     HC BIOPSY/EXCIS CERVICAL LESION W/WO FULGURATION  08-15-00,12-    endocervical polypectomy     HC TOOTH EXTRACTION W/FORCEP      wisdom teeth     LUMPECTOMY BREAST WITH SENTINEL NODE, COMBINED Left 6/29/2016    Segmental mastectomy with axillary lymph node dissection     REMOVE PORT VASCULAR ACCESS Right 6/29/2016    Procedure: REMOVE PORT VASCULAR ACCESS;  Surgeon: Gianna Amezcua MD;  Location:  OR     MEDS:  Current Outpatient Prescriptions   Medication     Calcium Citrate-Vitamin D (CALCIUM + D PO)     IODINE, KELP, PO     letrozole (FEMARA) 2.5 MG tablet     order for DME     order for DME     order for DME     order for DME     No current facility-administered medications for this visit.      Facility-Administered Medications Ordered in Other Visits   Medication     lidocaine BUFFERED 1 % solution 10 mL       ALLERGIES:  Allergies   Allergen Reactions     Benadryl [Diphenhydramine] Other (See Comments)     Pt had severe hypotension, nausea and vasovagal type reaction to IV Benadryl.   Give PO only.      Cats      Keflex [Cephalexin]      rash        PHYSICAL EXAM:  /84 (BP Location: Right arm, Patient Position: Sitting, Cuff Size: Adult Large)  Pulse 97  Temp 98  F (36.7  C) (Oral)  Resp 16  Wt 98.7 kg (217 lb 9.6 oz)  LMP 10/28/2009  SpO2 100%  BMI 37.35 kg/m2  LMP 10/28/2009  General:  Well appearing, obese adult female in NAD.  HEENT:  Normocephalic.  Sclera anicteric.  MMM.  No lesions of the oropharynx.  Lymph:  No palpable cervical, supraclavicular, or axillary LAD.  Chest:  CTA bilaterally.  No wheezes or crackles.  CV:  RRR.  Nl S1 and S2.  No m/r/g.  Breast:  Bilateral breasts are of normal fibroglandular  density.  Radiation skin changes of the left breast including skin thickening and enhancement of pores.  There are no discretely palpable masses in either breast.  Bilateral nipples are everted.  No nipple discharge.  Abd:  Soft/NT/ND.  BSs normoactive.  No hepatosplenomegaly.  Ext:  No pitting edema of the bilateral lower extremities.  Pulses 2+ and symmetric.  Musculo:  Strength 5/5 throughout.  No notable lymphedema of the LUE or left chest wall.  There is focal tenderness left lateral chest wall in area of patient reported pain (unchanged from prior).  No tenderness with palpation of the spine.  Winces in pain with going from lying to sitting on the exam table.  Neuro:  Cranial nerves grossly intact.  Gait stable.  Psych:  Intermittently tearful in today's visit.  Skin:  Papular, violaceous lesion with ring like desquamation above the right elbow with overlying scab.    LABS REVIEWED THIS VISIT:  10/8/18 Labs:  Electrolytes, creatinine, and LFTs are wnl.  Blood counts are wnl.    10/8/18 DEXA bone density scan:  Lowest T-score = -0.1    IMPRESSION/PLAN: Remedios Osorio is a 62 year old female with stage IIIa, E2sY5X7, ER/VT positive, HER2 negative left breast cancer. She is s/p 11 weeks of weekly taxol, 1 cycle of ddAC, left breast lumpectomy, ALND, and adjuvant radiation.  She has been on letrozole since early September, 2016.      1.  Stage III breast cancer:  Remedios is 2 years 3.5 months out from excision of a left sided breast cancer.  She continues on letrozole.  Despite this side effect, she is willing to continue taking it.  We plan to continue letrozole to complete a total of 10 years of endocrine therapy (through 09/2026).  There is no evidence of disease recurrence on clinical breast exam performed today.  I will see her back in 4 months for her next visit.  She will be due for bilateral mammograms at that time.    2.  Mid-low back pain:  NM bone scan in 03/2018 was without bone metastases.  MRI T- and  L-spine ordered at last visit, but patient did not schedule.  She again declines imaging today.  She states pain is slightly improved since last visit; she will notify me if she decides to proceed with the imaging.    3.  Subclinical hypothyroidism:  Last TSH was wnl.  Recommend all future monitoring by her PCP.    4.  Bone Health:  DEXA bone density scan performed in 10/2016 showed a lowest T-score of -0.5.  DEXA today shows a lowest T-score of -0.1.  Discussed use of zometa for prevention of breast cancer bone metastases today.  We reviewed zometa is given as a 15 minute IV infusion once every 6 months.  I would recommend 2 years total of treatment.  We reviewed potential side effects of zometa including risk of osteonecrosis of the jaw.  She would like to take some time to consider whether or not to start it.  She was given the chemocare hand out on zometa today.  She plans to see her dentist in November and will discuss it with her dentist at that time.  Continue calcium 1200 mg and vitamin D 1000 IU PO daily.      5.  Depression/hot flashes:  Recommended consideration of an serotonin specific reuptake inhibitor.  She declines medication at this time.  We discussed resuming healthy eating and exercise as a means of mitigating stress.    6.  Skin lesion:  Right forearm.  Appears to be a wart.  Offered dermatology referral for cryotherapy.  She declined.    7.  Follow Up:  Return to clinic in approximately 4 months for lab, bilateral screening mammograms, and visit with me.

## 2018-10-08 NOTE — NURSING NOTE
"Oncology Rooming Note    October 8, 2018 2:47 PM   Remedios Osorio is a 63 year old female who presents for:    Chief Complaint   Patient presents with     Blood Draw     Labs collected via VPT byRN. VS taken. Pt checked in for next appt     Oncology Clinic Visit     return - breast ca      Initial Vitals: /84 (BP Location: Right arm, Patient Position: Sitting, Cuff Size: Adult Large)  Pulse 97  Temp 98  F (36.7  C) (Oral)  Resp 16  Wt 98.7 kg (217 lb 9.6 oz)  LMP 10/28/2009  SpO2 100%  BMI 37.35 kg/m2 Estimated body mass index is 37.35 kg/(m^2) as calculated from the following:    Height as of 6/4/18: 1.626 m (5' 4\").    Weight as of this encounter: 98.7 kg (217 lb 9.6 oz). Body surface area is 2.11 meters squared.  No Pain (1) Comment: headache- low grade   Patient's last menstrual period was 10/28/2009.  Allergies reviewed: Yes  Medications reviewed: Yes    Medications: Medication refills not needed today.  Pharmacy name entered into [employer]:    [facility] - [place], [place] - [address]  [id number] - [place], [place] [address]  [id number] - [place], [place] - [address] [facility] [place] [address] [facility] [place] - [address]  [facility] [place], [place] - [address]  [facility] - [place], [place] - [address]    Clinical concerns: check skin on right arm      6 minutes for nursing intake (face to face time)     Hedy Garcia CMA            "

## 2018-10-08 NOTE — NURSING NOTE
Chief Complaint   Patient presents with     Blood Draw     Labs collected via VPT byRN. VS taken. Pt checked in for next appt     Labs collected from venipuncture by RN. Vitals taken. Checked in for appointment(s).    Christiana CHU RN PHN BSN  BMT/Oncology Lab

## 2018-10-08 NOTE — LETTER
10/8/2018       RE: Remedios Osorio  [address] [address]     Dear Colleague,    Thank you for referring your patient, Remedios Osorio, to the [facility]. Please see a copy of my visit note below.    MEDICAL ONCOLOGY FOLLOW-UP PATIENT VISIT     NAME: Remedios Osorio     DATE: 10/8/2018    PRIMARY CARE PHYSICIAN: Kae Caal    PATIENT ID: Clinical Stage II-B Breast Cancer    Oncology History: Remedios Osorio is a 62 year old female with h/o stage IIIa, V1cK5iX1, ER positive, MI positive, HER2 non-amplified invasive ductal carcinoma of the left breast. Her PCP palpated a breast mass in the lower outer left breast in 01/2016. Diagnostic mammogram and ultrasound showed a mass at 4:30, 8 cm from the nipple, measuring 3.3 cm on mammogram and 2.1 cm on ultrasound. She was also found to have multiple abnormal left axillary lymph nodes. The largest one was 2.4 cm. Core needle biopsy of her left breast mass demonstrated invasive ductal carcinoma, grade 3 with extensive tumor necrosis. Axillary LN biopsy was also positive for malignancy. The tumor cells were strongly positive for estrogen receptor (> 95%) and are moderate to strongly positive for progesterone receptor (60-70%). HER2/lizz was non-amplified by FISH.  PET/CT showed the left breast mass, 5 hypermetabolic left axillary lymph nodes, indeterminate left cervical chain lymph nodes, and an indeterminate hypodensity in the dome of the liver. She received 11 cycles of weekly Taxol, the 12th was cancelled due to neuropathy. She received one cycle of ddAC but did not tolerate this well due to fatigue and generalized weakness. She declined any further chemotherapy.    Left breast lumpectomy and left axillary lymph node dissection was performed by Dr. Amezcua on 6/29/16.  Pathology showed a residual 1.6 cm grade 2, IDC in the left breast.  Surgical margins were negative.  Invasive tumor cellularity of 30%.  Tumor infiltrating  lymphocytes were estimated at 50%.  6/19 excised lymph nodes were involved with malignancy.  The largest lymph node metastasis measured 3.9 cm and had a 1 mm area of extranodal extension.  Minimal treatment related changes were noted in the lymph nodes.  Pathologic staging was Q4zL0I2, or stage IIIa.  [facility] residual cancer burden was Class III.  Unfortunately she was not eligible for ARIADNA due to having received only 13 weeks of neoadjuvant chemotherapy.  Adjuvant Xeloda was recommended, she declined.  She completed radiation on 10/17/16.  She has been on letrozole since early 09/2016.    Interim History:   Ms. Osorio comes in to clinic today for routine breast cancer followup.  She continues on treatment with letrozole.  She feels that she is under a great amount of stress.  She has had a lot of stress from her boss at work.  In addition, one of her 2 dogs passed away 3 weeks ago.  She is still adjusting to the loss.  She reports symptoms of depression in that she has had increased tearfulness.  She has been eating more than usual and has not been able to sleep well.  She denies new bone or joint aches or pains.  She continues to have low to mid back pain.  She states that since last visit, she has been using hemp oil with some relief and feels the pain is less.  She denies new pains.  She has no cough, shortness of breath or chest pain.  No current abdominal complaints.  She denies headaches, visual changes or focal neurologic complaints.  She has had no fevers or chills.  She reports left chest wall pain which is intermittent and is not bothersome enough for her to take pain medicine.  She also has intermittent hot flashes, but again declines medication for them.  The same is true for her vaginal dryness.  The remainder of a complete 12-point review of systems is completed and was negative with the exception of that mentioned above.       PAST MEDICAL HISTORY:   Past Medical History:   Diagnosis Date      ABNL LIVER FUN STUDY  W/ MONO  5/01     Breast cancer (H)      CHR BLOOD LOSS ANEMIA DUE TO FIBROIDS 6/9/2005     ELEVATED HBA1C 6.2% 6/05 8/6/2005     HX MUCOUS POLYPS OF CERVIX  2000     HYPERLIPIDEMIA       Infectious mononucleosis 5/01     MENORRHAGIA      MIGRAINE HEADACHES      MORBID OBESITY BMI 44.79 6/05 6/12/2005     UTERINE LEIOMYOMA  2/7/2003       PAST SURGICAL HISTORY:  Past Surgical History:   Procedure Laterality Date     CL AFF SURGICAL PATHOLOGY  2005    UTERINE LEIOMYOMA  [D25.9]     HC BIOPSY/EXCIS CERVICAL LESION W/WO FULGURATION  08-15-00,12-    endocervical polypectomy     HC TOOTH EXTRACTION W/FORCEP      wisdom teeth     LUMPECTOMY BREAST WITH SENTINEL NODE, COMBINED Left 6/29/2016    Segmental mastectomy with axillary lymph node dissection     REMOVE PORT VASCULAR ACCESS Right 6/29/2016    Procedure: REMOVE PORT VASCULAR ACCESS;  Surgeon: Gianna Amezcua MD;  Location:  OR     MEDS:  Current Outpatient Prescriptions   Medication     Calcium Citrate-Vitamin D (CALCIUM + D PO)     IODINE, KELP, PO     letrozole (FEMARA) 2.5 MG tablet     order for DME     order for DME     order for DME     order for DME     No current facility-administered medications for this visit.      Facility-Administered Medications Ordered in Other Visits   Medication     lidocaine BUFFERED 1 % solution 10 mL       ALLERGIES:  Allergies   Allergen Reactions     Benadryl [Diphenhydramine] Other (See Comments)     Pt had severe hypotension, nausea and vasovagal type reaction to IV Benadryl.   Give PO only.      Cats      Keflex [Cephalexin]      rash        PHYSICAL EXAM:  /84 (BP Location: Right arm, Patient Position: Sitting, Cuff Size: Adult Large)  Pulse 97  Temp 98  F (36.7  C) (Oral)  Resp 16  Wt 98.7 kg (217 lb 9.6 oz)  LMP 10/28/2009  SpO2 100%  BMI 37.35 kg/m2  LMP 10/28/2009  General:  Well appearing, obese adult female in NAD.  HEENT:  Normocephalic.  Sclera anicteric.  MMM.  No  lesions of the oropharynx.  Lymph:  No palpable cervical, supraclavicular, or axillary LAD.  Chest:  CTA bilaterally.  No wheezes or crackles.  CV:  RRR.  Nl S1 and S2.  No m/r/g.  Breast:  Bilateral breasts are of normal fibroglandular density.  Radiation skin changes of the left breast including skin thickening and enhancement of pores.  There are no discretely palpable masses in either breast.  Bilateral nipples are everted.  No nipple discharge.  Abd:  Soft/NT/ND.  BSs normoactive.  No hepatosplenomegaly.  Ext:  No pitting edema of the bilateral lower extremities.  Pulses 2+ and symmetric.  Musculo:  Strength 5/5 throughout.  No notable lymphedema of the LUE or left chest wall.  There is focal tenderness left lateral chest wall in area of patient reported pain (unchanged from prior).  No tenderness with palpation of the spine.  Winces in pain with going from lying to sitting on the exam table.  Neuro:  Cranial nerves grossly intact.  Gait stable.  Psych:  Intermittently tearful in today's visit.  Skin:  Papular, violaceous lesion with ring like desquamation above the right elbow with overlying scab.    LABS REVIEWED THIS VISIT:  10/8/18 Labs:  Electrolytes, creatinine, and LFTs are wnl.  Blood counts are wnl.    10/8/18 DEXA bone density scan:  Lowest T-score = -0.1    IMPRESSION/PLAN: Remedios Osorio is a 62 year old female with stage IIIa, S4iK9Q8, ER/[place] positive, HER2 negative left breast cancer. She is s/p 11 weeks of weekly taxol, 1 cycle of ddAC, left breast lumpectomy, ALND, and adjuvant radiation.  She has been on letrozole since early September, 2016.      1.  Stage III breast cancer:  Remedios is 2 years 3.5 months out from excision of a left sided breast cancer.  She continues on letrozole.  Despite this side effect, she is willing to continue taking it.  We plan to continue letrozole to complete a total of 10 years of endocrine therapy (through 09/2026).  There is no evidence of disease recurrence on  clinical breast exam performed today.  I will see her back in 4 months for her next visit.  She will be due for bilateral mammograms at that time.    2.  Mid-low back pain:  NM bone scan in 03/2018 was without bone metastases.  MRI T- and L-spine ordered at last visit, but patient did not schedule.  She again declines imaging today.  She states pain is slightly improved since last visit; she will notify me if she decides to proceed with the imaging.    3.  Subclinical hypothyroidism:  Last TSH was wnl.  Recommend all future monitoring by her PCP.    4.  Bone Health:  DEXA bone density scan performed in 10/2016 showed a lowest T-score of -0.5.  DEXA today shows a lowest T-score of -0.1.  Discussed use of zometa for prevention of breast cancer bone metastases today.  We reviewed zometa is given as a 15 minute IV infusion once every 6 months.  I would recommend 2 years total of treatment.  We reviewed potential side effects of zometa including risk of osteonecrosis of the jaw.  She would like to take some time to consider whether or not to start it.  She was given the chemocare hand out on zometa today.  She plans to see her dentist in November and will discuss it with her dentist at that time.  Continue calcium 1200 mg and vitamin D 1000 IU PO daily.      5.  Depression/hot flashes:  Recommended consideration of an serotonin specific reuptake inhibitor.  She declines medication at this time.  We discussed resuming healthy eating and exercise as a means of mitigating stress.    6.  Skin lesion:  Right forearm.  Appears to be a wart.  Offered dermatology referral for cryotherapy.  She declined.    7.  Follow Up:  Return to clinic in approximately 4 months for lab, bilateral screening mammograms, and visit with me.      Again, thank you for allowing me to participate in the care of your patient.      Sincerely,    Kanchan Patel MD

## 2018-10-08 NOTE — MR AVS SNAPSHOT
After Visit Summary   10/8/2018    Remedios Osorio    MRN: [id number]           Patient Information     Date Of Birth          1955        Visit Information        Provider Department      10/8/2018 2:45 PM Kanchan Patel MD [facility]        Today's Diagnoses     Malignant neoplasm of lower-outer quadrant of left breast of female, estrogen receptor positive (H)    -  1       Follow-ups after your visit        Your next 10 appointments already scheduled     Feb 11, 2019  1:00 PM CST   [facility] with  [facility]   [facility] ([facility])    [address]  [address]  [place] [address]   [phone]            Feb 11, 2019  1:30 PM CST   MA DIAGNOSTIC DIGITAL BILATERAL with [id number]   [facility] ([facility])    [address]  [place] [address]   [phone]           How do I prepare for my exam? (Food and drink instructions) No Food and Drink Restrictions.  How do I prepare for my exam? (Other instructions) Do not use any powder, lotion or deodorant under your arms or on your breast. If you do, we will ask you to remove it before your exam.  What should I wear: Wear comfortable, two-piece clothing.  How long does the exam take: Most scans will take 15 minutes.  What should I bring: Bring any previous mammograms from other facilities or have them mailed to the breast center.  Do I need a :  No  is needed.  What do I need to tell my doctor: If you have any allergies, tell your care team.  What should I do after the exam: No restrictions, You may resume normal activities.  What is this test: This test is an x-ray of the breast to look for breast disease. The breast is pressed between two plates to flatten and spread the tissue. An X-ray is taken of the breast from different angles.  Who should I call with questions: If  "you have any questions, please call the Imaging Department where you will have your exam. Directions, parking instructions, and other information is available on our website, [email or web address].  Other information about my exam Three-dimensional (3D) mammograms are available at [facility] locations in [facility], [facility], [facility], [place], and [place]. [facility] locations include [place] and the [facility] in [place].  Benefits of 3D mammograms include: * Improved rate of cancer detection * Decreases your chance of having to go back for more tests, which means fewer: * \"False-positive\" results (This means that there is an abnormal area but it isn't cancer.) * Invasive testing procedures, such as a biopsy or surgery * Can provide clearer images of the breast if you have dense breast tissue.  *3D mammography is an optional exam that anyone can have with a 2D mammogram. It doesn't replace or take the place of a 2D mammogram. 2D mammograms remain an effective screening test for all women.  Not all insurance companies cover the cost of a 3D mammogram. Check with your insurance.            Feb 11, 2019  2:45 PM CST   (Arrive by 2:30 PM)   Return Visit with Kanchan Patel MD   [facility] ([facility])    [address] [place] [address]  [address] [place] [id number] [phone]              Who to contact     If you have questions or need follow up information about today's clinic visit or your schedule please contact [facility] directly at [phone].  Normal or non-critical lab and imaging results will be communicated to you by MyChart, letter or phone within 4 business days after the clinic has received the results. If you do not hear from us within 7 days, please contact the clinic through MyChart or phone. If you have a critical or abnormal lab result, we will notify you by phone as soon as " possible.  Submit refill requests through [employer] or call your pharmacy and they will forward the refill request to us. Please allow 3 business days for your refill to be completed.          Additional Information About Your Visit        [employer] Information     [employer] gives you secure access to your electronic health record. If you see a primary care provider, you can also send messages to your care team and make appointments. If you have questions, please call your primary care clinic.  If you do not have a primary care provider, please call [phone] and they will assist you.        Care EveryWhere ID     This is your Care EveryWhere ID. This could be used by other organizations to access your [facility] medical records  [id number]        Your Vitals Were     Pulse Temperature Respirations Last Period Pulse Oximetry BMI (Body Mass Index)    97 98  F (36.7  C) (Oral) 16 10/28/2009 100% 37.35 kg/m2       Blood Pressure from Last 3 Encounters:   10/08/18 123/84   06/04/18 148/88   06/04/18 148/89    Weight from Last 3 Encounters:   10/08/18 98.7 kg (217 lb 9.6 oz)   06/04/18 98.9 kg (218 lb)   06/04/18 99 kg (218 lb 3.2 oz)               Primary Care Provider Office Phone # Fax #    [facility] [phone]       [place] [address] [address] [facility] [address]  [facility] [address]        Equal Access to Services     ISABELA KNIGHT : Hadii aad ku hadasho Soomaali, waaxda luqadaha, qaybta kaalmada adeegyada, paolo lang. So [facility] [phone].    ATENCIÓN: Si habla español, tiene a law disposición servicios gratuitos de asistencia lingüística. Llame al [phone].    We comply with applicable federal civil rights laws and [place] laws. We do not discriminate on the basis of race, color, national origin, age, disability, sex, sexual orientation, or gender identity.            Thank you!     Thank you for choosing [facility]  for your care. Our goal is always to provide you with excellent care. Hearing back from our patients is one way we can continue to improve our services. Please take a few minutes to complete the written survey that you may receive in the mail after your visit with us. Thank you!             Your Updated Medication List - Protect others around you: Learn how to safely use, store and throw away your medicines at www.disposemymeds.org.          This list is accurate as of 10/8/18  3:40 PM.  Always use your most recent med list.                   Brand Name Dispense Instructions for use Diagnosis    CALCIUM + D PO           IODINE (KELP) PO           letrozole 2.5 MG tablet    FEMARA    90 tablet    Take 1 tablet (2.5 mg) by mouth daily    Malignant neoplasm of lower-outer quadrant of left breast of female, estrogen receptor positive (H)       * order for DME     1 each    Equipment being ordered: Sit Stand Desk    Malignant neoplasm of female breast, unspecified laterality, unspecified site of breast       * order for DME     1 Device    Equipment being ordered: Compression sleeve    Lymphedema of left arm       * order for DME     1 each    Equipment being ordered: Compression sleeve/gauntlet 20-40 mmHG    Lymphedema       * order for DME     1 each    Equipment being ordered: compression bra    Lymphedema       * Notice:  This list has 4 medication(s) that are the same as other medications prescribed for you. Read the directions carefully, and ask your doctor or other care provider to review them with you.

## 2019-03-08 NOTE — PROGRESS NOTES
MEDICAL ONCOLOGY FOLLOW-UP PATIENT VISIT     NAME: Remedios Osorio     DATE: 3/11/2019    PRIMARY CARE PHYSICIAN: Kae Caal    PATIENT ID: Clinical Stage II-B Breast Cancer    Oncology History: Remedios Osorio is a 63 year old female with h/o stage IIIa, B7dP2rP5, ER positive, IA positive, HER2 non-amplified invasive ductal carcinoma of the left breast. Her PCP palpated a breast mass in the lower outer left breast in 01/2016. Diagnostic mammogram and ultrasound showed a mass at 4:30, 8 cm from the nipple, measuring 3.3 cm on mammogram and 2.1 cm on ultrasound. She was also found to have multiple abnormal left axillary lymph nodes. The largest one was 2.4 cm. Core needle biopsy of her left breast mass demonstrated invasive ductal carcinoma, grade 3 with extensive tumor necrosis. Axillary LN biopsy was also positive for malignancy. The tumor cells were strongly positive for estrogen receptor (> 95%) and are moderate to strongly positive for progesterone receptor (60-70%). HER2/lizz was non-amplified by FISH.  PET/CT showed the left breast mass, 5 hypermetabolic left axillary lymph nodes, indeterminate left cervical chain lymph nodes, and an indeterminate hypodensity in the dome of the liver. She received 11 cycles of weekly Taxol, the 12th was cancelled due to neuropathy. She received one cycle of ddAC but did not tolerate this well due to fatigue and generalized weakness. She declined any further chemotherapy.    Left breast lumpectomy and left axillary lymph node dissection was performed by Dr. Amezcua on 6/29/16.  Pathology showed a residual 1.6 cm grade 2, IDC in the left breast.  Surgical margins were negative.  Invasive tumor cellularity of 30%.  Tumor infiltrating lymphocytes were estimated at 50%.  6/19 excised lymph nodes were involved with malignancy.  The largest lymph node metastasis measured 3.9 cm and had a 1 mm area of extranodal extension.  Minimal treatment related changes were noted in the  lymph nodes.  Pathologic staging was M4kG2T5, or stage IIIa.  MD José Miguel residual cancer burden was Class III.  Unfortunately she was not eligible for ARIADNA due to having received only 13 weeks of neoadjuvant chemotherapy.  Adjuvant Xeloda was recommended, she declined.  She completed radiation on 10/17/16.  She declined zometa for prevention of bone metastases.  She has been on letrozole since early 09/2016.    Interim History:   Remedios Osorio comes into clinic for routine breast cancer follow up. She is currently on letrozole and tolerating therapy well. She is still sad from her dog passing away in September of 2018. She said they performed an autopsy which showed a cancer in the atrium of the heart which burst. She says she does not have a significant other or kids, so her dogs were like family to her. She says her mood is slowly getting better. Her back pain has significantly improved and is not bothering her anymore. She complains of pain in her L shoulder but only after shoveling snow. She still gets hot flashes at night but says they are tolerable. She has gained about 10 lb in the last several months which she attributes to inactivity in the winter. She also has trigger finger in her L 1st and 4th digit. She says her migraine headaches has been increasing in frequency. She has an associated aura. No nausea or vomiting. She denies fevers, chills, shortness of breath, chest pain, abdominal pain, or new joint or bone pain.  The remainder of a complete 12 point review of systems was reviewed with the patient and was negative with the exception of that mentioned above.    PAST MEDICAL HISTORY:   Past Medical History:   Diagnosis Date     ABNL LIVER Counts include 234 beds at the [facility] STUDY  W/ MONO  5/01     Breast cancer (H)      CHR BLOOD LOSS ANEMIA DUE TO FIBROIDS 6/9/2005     ELEVATED HBA1C 6.2% 6/05 8/6/2005     HX MUCOUS POLYPS OF CERVIX  2000     HYPERLIPIDEMIA       Infectious mononucleosis 5/01     MENORRHAGIA      MIGRAINE  "HEADACHES      MORBID OBESITY BMI 44.79 6/05 6/12/2005     UTERINE LEIOMYOMA  2/7/2003       PAST SURGICAL HISTORY:  Past Surgical History:   Procedure Laterality Date     CL AFF SURGICAL PATHOLOGY  2005    UTERINE LEIOMYOMA  [D25.9]     HC BIOPSY/EXCIS CERVICAL LESION W/WO FULGURATION  08-15-00,12-    endocervical polypectomy     HC TOOTH EXTRACTION W/FORCEP      wisdom teeth     LUMPECTOMY BREAST WITH SENTINEL NODE, COMBINED Left 6/29/2016    Segmental mastectomy with axillary lymph node dissection     REMOVE PORT VASCULAR ACCESS Right 6/29/2016    Procedure: REMOVE PORT VASCULAR ACCESS;  Surgeon: Gianna Amezcua MD;  Location:  OR     MEDS:  Current Outpatient Medications   Medication     Calcium Citrate-Vitamin D (CALCIUM + D PO)     IODINE, KELP, PO     letrozole (FEMARA) 2.5 MG tablet     order for DME     order for DME     order for DME     order for DME     No current facility-administered medications for this visit.      Facility-Administered Medications Ordered in Other Visits   Medication     lidocaine BUFFERED 1 % solution 10 mL       ALLERGIES:  Allergies   Allergen Reactions     Benadryl [Diphenhydramine] Other (See Comments)     Pt had severe hypotension, nausea and vasovagal type reaction to IV Benadryl.   Give PO only.      Cats      Keflex [Cephalexin]      rash        PHYSICAL EXAM:  /77 (BP Location: Right arm, Patient Position: Sitting, Cuff Size: Adult Large)   Pulse 90   Temp 99.2  F (37.3  C) (Oral)   Resp 16   Ht 1.626 m (5' 4\")   Wt 103.7 kg (228 lb 9.6 oz)   LMP 10/28/2009   SpO2 97%   Breastfeeding? No   BMI 39.24 kg/m    General:  Well appearing, obese adult female in NAD.  HEENT:  Normocephalic.  Sclera anicteric.  MMM.  No lesions of the oropharynx.  Lymph:  No palpable cervical, supraclavicular, or axillary LAD.  Chest:  CTA bilaterally.  No wheezes or crackles.  CV:  RRR.  Nl S1 and S2.  No m/r/g.  Breast:  Bilateral breasts are of normal fibroglandular " density.  Radiation skin changes of the left breast including skin thickening and enhancement of pores.  There are no discretely palpable masses in either breast.  Bilateral nipples are everted.  No nipple discharge.  Abd:  Soft/NT/ND.  BSs normoactive.  No hepatosplenomegaly.  Musculo:  Strength 5/5 throughout.  No notable lymphedema of the LUE or left chest wall.  There is focal tenderness left lateral chest wall in area of patient reported pain (unchanged from prior).   Neuro:  Cranial nerves grossly intact. Sensation intact in bilateral upper and lower extremities Gait stable.  Psych: Normal affect.   Skin:  Papular, violaceous lesion with ring like desquamation above the right elbow with overlying scab.    LABS REVIEWED THIS VISIT:  3/11/19 Labs:  Blood counts and CMP are wnl.    3/11/19 Bilateral mammograms: No concerning findings on our view.  Final radiology read is pending.    IMPRESSION/PLAN: Ms. Osorio is a 63 year old female with stage IIIa, W8pN5V6, ER/VA positive, HER2 negative left breast cancer. She is s/p 11 weeks of weekly taxol, 1 cycle of ddAC, left breast lumpectomy, ALND, and adjuvant radiation.  She has been on letrozole since early September, 2016.      1.  Stage III breast cancer:  Remedios is 2 years 8.5 months out from excision of a left breast cancer.  She continues on letrozole.  She has arthralgias and hot flashes related to the medication.  Discussed trigger finger can also result from AI therapy.  Despite these side effects, she is willing to continue the medication.  We plan to continue letrozole to complete a total of 10 years of endocrine therapy (through 09/2026).  She is asymptomatic of disease recurrence and there is no evidence of disease recurrence on exam today. I will see her back in 4 months for her next visit.      2.  Mid-low back pain:  NM bone scan in 03/2018 was without bone metastases.  MRI T- and L-spine ordered in 06/2018, but patient did not schedule.  Back pain has  significantly improved. Will continue to monitor    3.  Bone Health:  DEXA bone density scan in 10/2018 showed a lowest T-score of -0.1.  I recommended initiation of zometa for prevention of breast cancer bone metastases.  We again reviewed the risks and benefits of adjuvant zometa today.  She declined starting zometa.  Continue calcium 1200 mg and vitamin D 1000 IU PO daily.      4.  Depression/hot flashes:  Has previously declined treatment with an serotonin specific reuptake inhibitor. Her depression is improving.    5.  Migraines:  Headaches are typical of that of known migraines with associated aura.  Recommend she follow up with her PCP given increased frequency of migraines.    6.  Follow Up:  Return to clinic in approximately 4 months for labs and visit with me.    Patient was seen in conjunction with the medical student.  I have edited the above note to reflect our joint assessment and plan.  A total of 20 minutes of my 25 minute face to face visit was spent in counseling.      Kanchan Patel MD

## 2019-03-11 ENCOUNTER — ANCILLARY PROCEDURE (OUTPATIENT)
Dept: MAMMOGRAPHY | Facility: CLINIC | Age: 64
End: 2019-03-11
Payer: COMMERCIAL

## 2019-03-11 ENCOUNTER — ONCOLOGY VISIT (OUTPATIENT)
Dept: ONCOLOGY | Facility: CLINIC | Age: 64
End: 2019-03-11
Attending: INTERNAL MEDICINE
Payer: COMMERCIAL

## 2019-03-11 ENCOUNTER — APPOINTMENT (OUTPATIENT)
Dept: LAB | Facility: CLINIC | Age: 64
End: 2019-03-11
Attending: INTERNAL MEDICINE
Payer: COMMERCIAL

## 2019-03-11 VITALS
DIASTOLIC BLOOD PRESSURE: 77 MMHG | OXYGEN SATURATION: 97 % | HEART RATE: 90 BPM | RESPIRATION RATE: 16 BRPM | TEMPERATURE: 99.2 F | WEIGHT: 228.6 LBS | HEIGHT: 64 IN | SYSTOLIC BLOOD PRESSURE: 128 MMHG | BODY MASS INDEX: 39.03 KG/M2

## 2019-03-11 DIAGNOSIS — Z51.89 CONVALESCENCE FOLLOWING CHEMOTHERAPY: ICD-10-CM

## 2019-03-11 DIAGNOSIS — Z17.0 MALIGNANT NEOPLASM OF LOWER-OUTER QUADRANT OF LEFT BREAST OF FEMALE, ESTROGEN RECEPTOR POSITIVE (H): Primary | ICD-10-CM

## 2019-03-11 DIAGNOSIS — C50.512 MALIGNANT NEOPLASM OF LOWER-OUTER QUADRANT OF LEFT BREAST OF FEMALE, ESTROGEN RECEPTOR POSITIVE (H): Primary | ICD-10-CM

## 2019-03-11 DIAGNOSIS — Z12.31 VISIT FOR SCREENING MAMMOGRAM: ICD-10-CM

## 2019-03-11 LAB
ALBUMIN SERPL-MCNC: 3.6 G/DL (ref 3.4–5)
ALP SERPL-CCNC: 92 U/L (ref 40–150)
ALT SERPL W P-5'-P-CCNC: 21 U/L (ref 0–50)
ANION GAP SERPL CALCULATED.3IONS-SCNC: 6 MMOL/L (ref 3–14)
AST SERPL W P-5'-P-CCNC: 15 U/L (ref 0–45)
BASOPHILS # BLD AUTO: 0.1 10E9/L (ref 0–0.2)
BASOPHILS NFR BLD AUTO: 1 %
BILIRUB SERPL-MCNC: 0.2 MG/DL (ref 0.2–1.3)
BUN SERPL-MCNC: 17 MG/DL (ref 7–30)
CALCIUM SERPL-MCNC: 8.8 MG/DL (ref 8.5–10.1)
CHLORIDE SERPL-SCNC: 106 MMOL/L (ref 94–109)
CO2 SERPL-SCNC: 26 MMOL/L (ref 20–32)
CREAT SERPL-MCNC: 0.78 MG/DL (ref 0.52–1.04)
DIFFERENTIAL METHOD BLD: NORMAL
EOSINOPHIL # BLD AUTO: 0.2 10E9/L (ref 0–0.7)
EOSINOPHIL NFR BLD AUTO: 4.8 %
ERYTHROCYTE [DISTWIDTH] IN BLOOD BY AUTOMATED COUNT: 14.5 % (ref 10–15)
GFR SERPL CREATININE-BSD FRML MDRD: 82 ML/MIN/{1.73_M2}
GLUCOSE SERPL-MCNC: 98 MG/DL (ref 70–99)
HCT VFR BLD AUTO: 39.4 % (ref 35–47)
HGB BLD-MCNC: 13 G/DL (ref 11.7–15.7)
IMM GRANULOCYTES # BLD: 0 10E9/L (ref 0–0.4)
IMM GRANULOCYTES NFR BLD: 0 %
LYMPHOCYTES # BLD AUTO: 1 10E9/L (ref 0.8–5.3)
LYMPHOCYTES NFR BLD AUTO: 21.8 %
MCH RBC QN AUTO: 30.7 PG (ref 26.5–33)
MCHC RBC AUTO-ENTMCNC: 33 G/DL (ref 31.5–36.5)
MCV RBC AUTO: 93 FL (ref 78–100)
MONOCYTES # BLD AUTO: 0.5 10E9/L (ref 0–1.3)
MONOCYTES NFR BLD AUTO: 10.3 %
NEUTROPHILS # BLD AUTO: 3 10E9/L (ref 1.6–8.3)
NEUTROPHILS NFR BLD AUTO: 62.1 %
NRBC # BLD AUTO: 0 10*3/UL
NRBC BLD AUTO-RTO: 0 /100
PLATELET # BLD AUTO: 221 10E9/L (ref 150–450)
POTASSIUM SERPL-SCNC: 4.4 MMOL/L (ref 3.4–5.3)
PROT SERPL-MCNC: 7.4 G/DL (ref 6.8–8.8)
RBC # BLD AUTO: 4.24 10E12/L (ref 3.8–5.2)
SODIUM SERPL-SCNC: 138 MMOL/L (ref 133–144)
WBC # BLD AUTO: 4.8 10E9/L (ref 4–11)

## 2019-03-11 PROCEDURE — 85025 COMPLETE CBC W/AUTO DIFF WBC: CPT | Performed by: INTERNAL MEDICINE

## 2019-03-11 PROCEDURE — 36415 COLL VENOUS BLD VENIPUNCTURE: CPT

## 2019-03-11 PROCEDURE — 99214 OFFICE O/P EST MOD 30 MIN: CPT | Mod: ZP | Performed by: INTERNAL MEDICINE

## 2019-03-11 PROCEDURE — 80053 COMPREHEN METABOLIC PANEL: CPT | Performed by: INTERNAL MEDICINE

## 2019-03-11 PROCEDURE — G0463 HOSPITAL OUTPT CLINIC VISIT: HCPCS | Mod: ZF

## 2019-03-11 RX ORDER — LETROZOLE 2.5 MG/1
2.5 TABLET, FILM COATED ORAL DAILY
Qty: 90 TABLET | Refills: 3 | Status: SHIPPED | OUTPATIENT
Start: 2019-03-11 | End: 2020-03-16

## 2019-03-11 ASSESSMENT — MIFFLIN-ST. JEOR: SCORE: 1586.92

## 2019-03-11 ASSESSMENT — PAIN SCALES - GENERAL: PAINLEVEL: NO PAIN (0)

## 2019-03-11 NOTE — LETTER
3/11/2019       RE: Remedios Osorio  [address] [address]     Dear Colleague,    Thank you for referring your patient, Remedios Osorio, to the [facility]. Please see a copy of my visit note below.    MEDICAL ONCOLOGY FOLLOW-UP PATIENT VISIT     NAME: Remedios Osorio     DATE: 3/11/2019    PRIMARY CARE PHYSICIAN: Kae Caal    PATIENT ID: Clinical Stage II-B Breast Cancer    Oncology History: Remedios Osorio is a 63 year old female with h/o stage IIIa, [id number], ER positive, NH positive, HER2 non-amplified invasive ductal carcinoma of the left breast. Her PCP palpated a breast mass in the lower outer left breast in 01/2016. Diagnostic mammogram and ultrasound showed a mass at 4:30, 8 cm from the nipple, measuring 3.3 cm on mammogram and 2.1 cm on ultrasound. She was also found to have multiple abnormal left axillary lymph nodes. The largest one was 2.4 cm. Core needle biopsy of her left breast mass demonstrated invasive ductal carcinoma, grade 3 with extensive tumor necrosis. Axillary LN biopsy was also positive for malignancy. The tumor cells were strongly positive for estrogen receptor (> 95%) and are moderate to strongly positive for progesterone receptor (60-70%). HER2/lizz was non-amplified by FISH.  PET/CT showed the left breast mass, 5 hypermetabolic left axillary lymph nodes, indeterminate left cervical chain lymph nodes, and an indeterminate hypodensity in the dome of the liver. She received 11 cycles of weekly Taxol, the 12th was cancelled due to neuropathy. She received one cycle of ddAC but did not tolerate this well due to fatigue and generalized weakness. She declined any further chemotherapy.    Left breast lumpectomy and left axillary lymph node dissection was performed by Dr. Amezcua on 6/29/16.  Pathology showed a residual 1.6 cm grade 2, IDC in the left breast.  Surgical margins were negative.  Invasive tumor cellularity of 30%.  Tumor infiltrating  lymphocytes were estimated at 50%.  6/19 excised lymph nodes were involved with malignancy.  The largest lymph node metastasis measured 3.9 cm and had a 1 mm area of extranodal extension.  Minimal treatment related changes were noted in the lymph nodes.  Pathologic staging was B9jW4X3, or stage IIIa.  [facility] residual cancer burden was Class III.  Unfortunately she was not eligible for ARIADNA due to having received only 13 weeks of neoadjuvant chemotherapy.  Adjuvant Xeloda was recommended, she declined.  She completed radiation on 10/17/16.  She declined zometa for prevention of bone metastases.  She has been on letrozole since early 09/2016.    Interim History:   Remedios Osorio comes into clinic for routine breast cancer follow up. She is currently on letrozole and tolerating therapy well. She is still sad from her dog passing away in September of 2018. She said they performed an autopsy which showed a cancer in the atrium of the heart which burst. She says she does not have a significant other or kids, so her dogs were like family to her. She says her mood is slowly getting better. Her back pain has significantly improved and is not bothering her anymore. She complains of pain in her L shoulder but only after shoveling snow. She still gets hot flashes at night but says they are tolerable. She has gained about 10 lb in the last several months which she attributes to inactivity in the winter. She also has trigger finger in her L 1st and 4th digit. She says her migraine headaches has been increasing in frequency. She has an associated aura. No nausea or vomiting. She denies fevers, chills, shortness of breath, chest pain, abdominal pain, or new joint or bone pain.  The remainder of a complete 12 point review of systems was reviewed with the patient and was negative with the exception of that mentioned above.    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       PAST SURGICAL HISTORY:  Past Surgical History:   Procedure Laterality Date     CL AFF SURGICAL PATHOLOGY  2005    UTERINE LEIOMYOMA  [D25.9]     HC BIOPSY/EXCIS CERVICAL LESION W/WO FULGURATION  08-15-00,12-    endocervical polypectomy     HC TOOTH EXTRACTION W/FORCEP      wisdom teeth     LUMPECTOMY BREAST WITH SENTINEL NODE, COMBINED Left 6/29/2016    Segmental mastectomy with axillary lymph node dissection     REMOVE PORT VASCULAR ACCESS Right 6/29/2016    Procedure: REMOVE PORT VASCULAR ACCESS;  Surgeon: Gianna Amezcua MD;  Location:  OR     MEDS:  Current Outpatient Medications   Medication     Calcium Citrate-Vitamin D (CALCIUM + D PO)     IODINE, KELP, PO     letrozole (FEMARA) 2.5 MG tablet     order for DME     order for DME     order for DME     order for DME     No current facility-administered medications for this visit.      Facility-Administered Medications Ordered in Other Visits   Medication     lidocaine BUFFERED 1 % solution 10 mL       ALLERGIES:  Allergies   Allergen Reactions     Benadryl [Diphenhydramine] Other (See Comments)     Pt had severe hypotension, nausea and vasovagal type reaction to IV Benadryl.   Give PO only.      Cats      Keflex [Cephalexin]      rash        PHYSICAL EXAM:  /77 (BP Location: Right arm, Patient Position: Sitting, Cuff Size: Adult Large)   Pulse 90   Temp 99.2  F (37.3  C) (Oral)   Resp 16   Ht 1.626 m (5' 4\")   Wt 103.7 kg (228 lb 9.6 oz)   LMP 10/28/2009   SpO2 97%   Breastfeeding? No   BMI 39.24 kg/m     General:  Well appearing, obese adult female in NAD.  HEENT:  Normocephalic.  Sclera anicteric.  MMM.  No " lesions of the oropharynx.  Lymph:  No palpable cervical, supraclavicular, or axillary LAD.  Chest:  CTA bilaterally.  No wheezes or crackles.  CV:  RRR.  Nl S1 and S2.  No m/r/g.  Breast:  Bilateral breasts are of normal fibroglandular density.  Radiation skin changes of the left breast including skin thickening and enhancement of pores.  There are no discretely palpable masses in either breast.  Bilateral nipples are everted.  No nipple discharge.  Abd:  Soft/NT/ND.  BSs normoactive.  No hepatosplenomegaly.  Musculo:  Strength 5/5 throughout.  No notable lymphedema of the LUE or left chest wall.  There is focal tenderness left lateral chest wall in area of patient reported pain (unchanged from prior).   Neuro:  Cranial nerves grossly intact. Sensation intact in bilateral upper and lower extremities Gait stable.  Psych: Normal affect.   Skin:  Papular, violaceous lesion with ring like desquamation above the right elbow with overlying scab.    LABS REVIEWED THIS VISIT:  3/11/19 Labs:  Blood counts and CMP are wnl.    3/11/19 Bilateral mammograms: No concerning findings on our view.  Final radiology read is pending.    IMPRESSION/PLAN: Ms. Osorio is a 63 year old female with stage IIIa, U8kU3W5, ER/NE positive, HER2 negative left breast cancer. She is s/p 11 weeks of weekly taxol, 1 cycle of ddAC, left breast lumpectomy, ALND, and adjuvant radiation.  She has been on letrozole since early September, 2016.      1.  Stage III breast cancer:  Remedios is 2 years 8.5 months out from excision of a left breast cancer.  She continues on letrozole.  She has arthralgias and hot flashes related to the medication.  Discussed trigger finger can also result from AI therapy.  Despite these side effects, she is willing to continue the medication.  We plan to continue letrozole to complete a total of 10 years of endocrine therapy (through 09/2026).  She is asymptomatic of disease recurrence and there is no evidence of disease recurrence  on exam today. I will see her back in 4 months for her next visit.      2.  Mid-low back pain:  NM bone scan in 03/2018 was without bone metastases.  MRI T- and L-spine ordered in 06/2018, but patient did not schedule.  Back pain has significantly improved. Will continue to monitor    3.  Bone Health:  DEXA bone density scan in 10/2018 showed a lowest T-score of -0.1.  I recommended initiation of zometa for prevention of breast cancer bone metastases.  We again reviewed the risks and benefits of adjuvant zometa today.  She declined starting zometa.  Continue calcium 1200 mg and vitamin D 1000 IU PO daily.      4.  Depression/hot flashes:  Has previously declined treatment with an serotonin specific reuptake inhibitor. Her depression is improving.    5.  Migraines:  Headaches are typical of that of known migraines with associated aura.  Recommend she follow up with her PCP given increased frequency of migraines.    6.  Follow Up:  Return to clinic in approximately 4 months for labs and visit with me.    Patient was seen in conjunction with the medical student.  I have edited the above note to reflect our joint assessment and plan.  A total of 20 minutes of my 25 minute face to face visit was spent in counseling.      Kanchan Patel MD

## 2019-03-11 NOTE — NURSING NOTE
Chief Complaint   Patient presents with     Blood Draw     Labs drawn via VPT by RN in lab. VS taken. Pt checked in for next appt     Labs collected from venipuncture by RN. Vitals taken. Checked in for appointment(s).    Christiana CHU RN PHN BSN  BMT/Oncology Lab

## 2019-03-11 NOTE — NURSING NOTE
"Oncology Rooming Note    March 11, 2019 4:37 PM   Remedios Osorio is a 63 year old female who presents for:    Chief Complaint   Patient presents with     Blood Draw     Labs drawn via VPT by RN in lab. VS taken. Pt checked in for next appt     Oncology Clinic Visit     [facility] RETURN- BREAST CA     Initial Vitals: /77 (BP Location: Right arm, Patient Position: Sitting, Cuff Size: Adult Large)   Pulse 90   Temp 99.2  F (37.3  C) (Oral)   Resp 16   Ht 1.626 m (5' 4\")   Wt 103.7 kg (228 lb 9.6 oz)   LMP 10/28/2009   SpO2 97%   Breastfeeding? No   BMI 39.24 kg/m   Estimated body mass index is 39.24 kg/m  as calculated from the following:    Height as of this encounter: 1.626 m (5' 4\").    Weight as of this encounter: 103.7 kg (228 lb 9.6 oz). Body surface area is 2.16 meters squared.  No Pain (0) Comment: Data Unavailable   Patient's last menstrual period was 10/28/2009.  Allergies reviewed: Yes  Medications reviewed: Yes    Medications: MEDICATION REFILLS NEEDED TODAY. Provider was notified.  LETRAZOLE    Pharmacy name entered into [employer]:    [facility] - [place], [place] - [address]  [id number] - [place], [place] - [address]  [id number] [place] [address] [facility] [place] [address] [facility] [place], [place] - [address] [place] [address] [facility] [place] - [place], [place] - [address]  [facility] [place], [place] - [address] [facility]    Clinical concerns: new concerns today are that she is gaining weight and she doesn't eat sugar, or carbs. Dr. Patel was notified.      Murray Palmer [facility]              "

## 2019-07-14 NOTE — PROGRESS NOTES
MEDICAL ONCOLOGY FOLLOW-UP PATIENT VISIT     NAME: Remedios Osorio     DATE: 7/15/2019    PRIMARY CARE PHYSICIAN: Kae Caal    PATIENT ID: Clinical Stage II-B Breast Cancer    Oncology History: Remedios Osorio is a 63 year old female with h/o stage IIIa, C4gD6pH8, ER positive, OK positive, HER2 non-amplified invasive ductal carcinoma of the left breast. Her PCP palpated a breast mass in the lower outer left breast in 01/2016. Diagnostic mammogram and ultrasound showed a mass at 4:30, 8 cm from the nipple, measuring 3.3 cm on mammogram and 2.1 cm on ultrasound. She was also found to have multiple abnormal left axillary lymph nodes. The largest one was 2.4 cm. Core needle biopsy of her left breast mass demonstrated invasive ductal carcinoma, grade 3 with extensive tumor necrosis. Axillary LN biopsy was also positive for malignancy. The tumor cells were strongly positive for estrogen receptor (> 95%) and are moderate to strongly positive for progesterone receptor (60-70%). HER2/lizz was non-amplified by FISH.  PET/CT showed the left breast mass, 5 hypermetabolic left axillary lymph nodes, indeterminate left cervical chain lymph nodes, and an indeterminate hypodensity in the dome of the liver. She received 11 cycles of weekly Taxol, the 12th was cancelled due to neuropathy. She received one cycle of ddAC but did not tolerate this well due to fatigue and generalized weakness. She declined any further chemotherapy.    Left breast lumpectomy and left axillary lymph node dissection was performed by Dr. Amezcua on 6/29/16.  Pathology showed a residual 1.6 cm grade 2, IDC in the left breast.  Surgical margins were negative.  Invasive tumor cellularity of 30%.  Tumor infiltrating lymphocytes were estimated at 50%.  6/19 excised lymph nodes were involved with malignancy.  The largest lymph node metastasis measured 3.9 cm and had a 1 mm area of extranodal extension.  Minimal treatment related changes were noted in the  lymph nodes.  Pathologic staging was B0jD2V9, or stage IIIa.  [facility] residual cancer burden was Class III.  Unfortunately she was not eligible for ARIADNA due to having received only 13 weeks of neoadjuvant chemotherapy.  Adjuvant Xeloda was recommended, she declined.  She completed radiation on 10/17/16.  She declined zometa for prevention of bone metastases.  She has been on letrozole since early 09/2016.    Interim History:   Ms. Osorio comes into clinic today for routine breast cancer followup.  She has a number of things that she would like to discuss today.  Amongst these, she reports a sensation as if there is a lump in her throat when she swallows.  She cannot palpate a lump on the external aspect of her neck; however, when she swallows, she feels as if there is a cotton ball in the throat.  She has not had any episodes of coughing or choking with eating or drinking fluids.  She has had no wheezing or shortness of breath.  Weight has been creeping up.  She states that she feels this is largely due to the fact that she has not been exercising.  She has continued on a ketogenic type diet and she feels that diet should not be the issue.        She denies concerning lumps or masses of either breast.  She has no new bone or joint aches or pains.  She continues to get a migraine every couple of weeks.  These are preceded by her classic aura.  She has a chronic history of migraines and reports the headaches are consistent with these.  She denies new neurologic changes.  She has no cough, shortness of breath or chest pain.  She reports insomnia.  She is not currently taking any medication for it.  Has previously tried melatonin at the 1 mg dose.  She also reports ongoing hot flashes.  These are worse at night and are interfering with sleep.  The remainder of a complete 12-point review of systems is reviewed with the patient and was negative with the exception of that mentioned above.     PAST MEDICAL HISTORY:    Past Medical History:   Diagnosis Date     [facility] LIVER FUNC STUDY  W/ MONO  5/01     Breast cancer (H)      CHR BLOOD LOSS ANEMIA DUE TO FIBROIDS 6/9/2005     ELEVATED HBA1C 6.2% 6/05 8/6/2005     HX MUCOUS POLYPS OF CERVIX  2000     HYPERLIPIDEMIA       Infectious mononucleosis 5/01     MENORRHAGIA      MIGRAINE HEADACHES      MORBID OBESITY BMI 44.79 6/05 6/12/2005     UTERINE LEIOMYOMA  2/7/2003       PAST SURGICAL HISTORY:  Past Surgical History:   Procedure Laterality Date     CL AFF SURGICAL PATHOLOGY  2005    UTERINE LEIOMYOMA  [D25.9]     HC BIOPSY/EXCIS CERVICAL LESION W/WO FULGURATION  08-15-00,12-    endocervical polypectomy     HC TOOTH EXTRACTION W/FORCEP      wisdom teeth     LUMPECTOMY BREAST WITH SENTINEL NODE, COMBINED Left 6/29/2016    Segmental mastectomy with axillary lymph node dissection     REMOVE PORT VASCULAR ACCESS Right 6/29/2016    Procedure: REMOVE PORT VASCULAR ACCESS;  Surgeon: Gianna Amezcua MD;  Location:  OR     MEDS:  Current Outpatient Medications   Medication     Calcium Citrate-Vitamin D (CALCIUM + D PO)     IODINE, KELP, PO     letrozole (FEMARA) 2.5 MG tablet     order for DME     order for DME     order for DME     order for DME     No current facility-administered medications for this visit.      Facility-Administered Medications Ordered in Other Visits   Medication     lidocaine BUFFERED 1 % solution 10 mL       ALLERGIES:  Allergies   Allergen Reactions     Benadryl [Diphenhydramine] Other (See Comments)     Pt had severe hypotension, nausea and vasovagal type reaction to IV Benadryl.   Give PO only.      Cats      Keflex [Cephalexin]      rash        PHYSICAL EXAM:  /86 (BP Location: Right arm, Patient Position: Sitting, Cuff Size: Adult Regular)   Pulse 87   Temp 98.6  F (37  C) (Oral)   Resp 16   Wt 104.7 kg (230 lb 12.8 oz)   LMP 10/28/2009   SpO2 96%   BMI 39.62 kg/m    LMP 10/28/2009   General:  Well appearing, obese adult female in NAD.   A&Ox3.  HEENT:  Normocephalic.  Sclera anicteric.  MMM.  No lesions of the oropharynx.  No palpable thyromegaly.  Lymph:  No palpable cervical, supraclavicular, or axillary LAD.  Chest:  CTA bilaterally.  No wheezes or crackles.  CV:  Regularly irregular.  No m/r/g.  Breast:  Bilateral breasts are of normal fibroglandular density.  Radiation skin changes of the left breast including skin thickening and enhancement of pores, most prominent over the inferior aspect of the breast.  There are no discretely palpable masses in either breast.  Bilateral nipples are everted.  No nipple discharge.  Abd:  Soft/NT/ND.  BSs normoactive.    Musculo:  Full ROM of the bilateral upper extremities.   Neuro:  Cranial nerves grossly intact. Gait stable.  Psych: Normal mood and affect.   Skin:  No visible concerning skin rashes or lesions.    LABS REVIEWED THIS VISIT:  7/16/19 Labs:  Blood counts are wnl.  Electrolytes, creatinine, and LFTs are wnl.    EKG - PVCs.  No other arrythmia    IMPRESSION/PLAN: Ms. Osorio is a 63 year old female with stage IIIa, P6aQ2P3, ER/GA positive, HER2 negative left breast cancer. She is s/p 11 weeks of weekly taxol, 1 cycle of ddAC, left breast lumpectomy, ALND, and adjuvant radiation.  She has been on letrozole since early September, 2016.      1.  Stage III breast cancer:  Remedios is >3 years out from excision of a left breast cancer.  She continues on letrozole.  She is overall tolerating the medication well.  We plan to continue letrozole to complete a total of 10 years of endocrine therapy (through 09/2026).  There is no evidence of disease recurrence on exam today. I will see her back in 6 months for her next visit.  Next mammogram will be due in 03/2020.    2.  Bone Health:  DEXA bone density scan in 10/2018 showed a lowest T-score of -0.1.  I recommended initiation of zometa for prevention of breast cancer bone metastases.  She initially declined starting zometa.  Today, she is amenable to  "starting it.  As she had a tooth pulled this Spring, will plan to start in September.  We reviewed the potential side effects again including risk of flu-like illness in the days following, arthralgias, and risk of osteonecrosis of the jaw.  In the meantime, will continue calcium 1200 mg and vitamin D 1000 IU PO daily.      3.  Lymphedema:  Worse since last admission.   Recommend return to lymphedema clinic.  Patient given a DME to obtain a new compression sleeve, glove, and bra today.      4.  Dysphagia:  Feels like a \"cotton ball in the throat\" with swallowing.  Will obtain ultrasound of neck to ensure no external compression by lymph nodes or the thyroid.  If ultrasound is without concerning finding, would recommend referral for endoscopy as per patient symptom has now been present for months.    5.  Weight management:  We spent quite some time discussing this today.  She is slowly gaining back the weight that she previously lost.  We reviewed the weight management clinics we have available at the [facility] including the medical weight management, 24 week healthy eating plan, and lifestyle management clinics.  She declines referral at this time.  She will re-introduce exercise into her daily routine.  I also advised her to count calories for a 1-2 week period of time, then eliminate 300 kilocalories per day, which should help her to lose approximately 3-4 pounds per month.    6.  Regularly irregular heart rhythm:  EKG performed today and shows PVCs.  Will discuss with PCP.  No other intervention necessary at this time.    7.  Migraines:  Headaches are typical of that of known migraines with associated aura.  Ongoing follow up with PCP.    8.  Insomnia:  Discussed sleep hygiene.  Recommended a sleep study to evaluate for sleep apnea; she declines at this time.  Discussed options for sleep aides.  She would like to only use \"natural\" sleep aides and therefore plans to resume melatonin use at the 3 mg dose at this " time.    9.  Hot flashes:  Discussed options for treatment with an serotonin specific reuptake inhibitor vs gabapentin.  Discussed given hot flashes are worse at night and interfering with sleep, gabapentin may be a good option because it has potential associated side effect of drowsiness.  She declined a prescription for gabapentin at this time, but will consider it if still insomnia despite melatonin.    10.  Follow Up:  Ultrasound neck within 1 week.  Labs and zometa infusion mid- end of September (patient requests on a Thursday).  Return to clinic in approximately 6 months for labs and visit with me.    It was a pleasure to see Ms. Osorio in clinic today.  A total of 30 minutes of our 40 minute face to face visit was spent in counseling.

## 2019-07-15 ENCOUNTER — APPOINTMENT (OUTPATIENT)
Dept: LAB | Facility: CLINIC | Age: 64
End: 2019-07-15
Attending: INTERNAL MEDICINE
Payer: COMMERCIAL

## 2019-07-15 ENCOUNTER — ONCOLOGY VISIT (OUTPATIENT)
Dept: ONCOLOGY | Facility: CLINIC | Age: 64
End: 2019-07-15
Attending: INTERNAL MEDICINE
Payer: COMMERCIAL

## 2019-07-15 VITALS
HEIGHT: 64 IN | BODY MASS INDEX: 39.4 KG/M2 | HEART RATE: 87 BPM | OXYGEN SATURATION: 96 % | TEMPERATURE: 98.6 F | WEIGHT: 230.8 LBS | SYSTOLIC BLOOD PRESSURE: 135 MMHG | RESPIRATION RATE: 16 BRPM | DIASTOLIC BLOOD PRESSURE: 86 MMHG

## 2019-07-15 DIAGNOSIS — Z17.0 MALIGNANT NEOPLASM OF LOWER-OUTER QUADRANT OF LEFT BREAST OF FEMALE, ESTROGEN RECEPTOR POSITIVE (H): Primary | ICD-10-CM

## 2019-07-15 DIAGNOSIS — Z92.21 STATUS POST CHEMOTHERAPY: ICD-10-CM

## 2019-07-15 DIAGNOSIS — I89.0 LYMPHEDEMA: ICD-10-CM

## 2019-07-15 DIAGNOSIS — R13.10 DYSPHAGIA, UNSPECIFIED TYPE: ICD-10-CM

## 2019-07-15 DIAGNOSIS — I49.9 CARDIAC ARRHYTHMIA, UNSPECIFIED CARDIAC ARRHYTHMIA TYPE: ICD-10-CM

## 2019-07-15 DIAGNOSIS — C50.512 MALIGNANT NEOPLASM OF LOWER-OUTER QUADRANT OF LEFT BREAST OF FEMALE, ESTROGEN RECEPTOR POSITIVE (H): Primary | ICD-10-CM

## 2019-07-15 LAB
ALBUMIN SERPL-MCNC: 3.8 G/DL (ref 3.4–5)
ALP SERPL-CCNC: 91 U/L (ref 40–150)
ALT SERPL W P-5'-P-CCNC: 23 U/L (ref 0–50)
ANION GAP SERPL CALCULATED.3IONS-SCNC: 6 MMOL/L (ref 3–14)
AST SERPL W P-5'-P-CCNC: 16 U/L (ref 0–45)
BASOPHILS # BLD AUTO: 0.1 10E9/L (ref 0–0.2)
BASOPHILS NFR BLD AUTO: 1 %
BILIRUB SERPL-MCNC: 0.2 MG/DL (ref 0.2–1.3)
BUN SERPL-MCNC: 18 MG/DL (ref 7–30)
CALCIUM SERPL-MCNC: 9.1 MG/DL (ref 8.5–10.1)
CHLORIDE SERPL-SCNC: 105 MMOL/L (ref 94–109)
CO2 SERPL-SCNC: 24 MMOL/L (ref 20–32)
CREAT SERPL-MCNC: 0.67 MG/DL (ref 0.52–1.04)
DIFFERENTIAL METHOD BLD: NORMAL
EOSINOPHIL # BLD AUTO: 0.2 10E9/L (ref 0–0.7)
EOSINOPHIL NFR BLD AUTO: 4.3 %
ERYTHROCYTE [DISTWIDTH] IN BLOOD BY AUTOMATED COUNT: 14.4 % (ref 10–15)
GFR SERPL CREATININE-BSD FRML MDRD: >90 ML/MIN/{1.73_M2}
GLUCOSE SERPL-MCNC: 90 MG/DL (ref 70–99)
HCT VFR BLD AUTO: 38.1 % (ref 35–47)
HGB BLD-MCNC: 12.8 G/DL (ref 11.7–15.7)
IMM GRANULOCYTES # BLD: 0 10E9/L (ref 0–0.4)
IMM GRANULOCYTES NFR BLD: 0.2 %
LYMPHOCYTES # BLD AUTO: 1.3 10E9/L (ref 0.8–5.3)
LYMPHOCYTES NFR BLD AUTO: 26.4 %
MCH RBC QN AUTO: 30.8 PG (ref 26.5–33)
MCHC RBC AUTO-ENTMCNC: 33.6 G/DL (ref 31.5–36.5)
MCV RBC AUTO: 92 FL (ref 78–100)
MONOCYTES # BLD AUTO: 0.5 10E9/L (ref 0–1.3)
MONOCYTES NFR BLD AUTO: 10.1 %
NEUTROPHILS # BLD AUTO: 2.8 10E9/L (ref 1.6–8.3)
NEUTROPHILS NFR BLD AUTO: 58 %
NRBC # BLD AUTO: 0 10*3/UL
NRBC BLD AUTO-RTO: 0 /100
PLATELET # BLD AUTO: 226 10E9/L (ref 150–450)
POTASSIUM SERPL-SCNC: 4 MMOL/L (ref 3.4–5.3)
PROT SERPL-MCNC: 7.4 G/DL (ref 6.8–8.8)
RBC # BLD AUTO: 4.15 10E12/L (ref 3.8–5.2)
SODIUM SERPL-SCNC: 136 MMOL/L (ref 133–144)
WBC # BLD AUTO: 4.9 10E9/L (ref 4–11)

## 2019-07-15 PROCEDURE — G0463 HOSPITAL OUTPT CLINIC VISIT: HCPCS | Mod: ZF

## 2019-07-15 PROCEDURE — 85025 COMPLETE CBC W/AUTO DIFF WBC: CPT | Performed by: INTERNAL MEDICINE

## 2019-07-15 PROCEDURE — 99215 OFFICE O/P EST HI 40 MIN: CPT | Mod: ZP | Performed by: INTERNAL MEDICINE

## 2019-07-15 PROCEDURE — 93010 ELECTROCARDIOGRAM REPORT: CPT | Performed by: INTERNAL MEDICINE

## 2019-07-15 PROCEDURE — 36415 COLL VENOUS BLD VENIPUNCTURE: CPT

## 2019-07-15 PROCEDURE — 80053 COMPREHEN METABOLIC PANEL: CPT | Performed by: INTERNAL MEDICINE

## 2019-07-15 ASSESSMENT — MIFFLIN-ST. JEOR: SCORE: 1597.15

## 2019-07-15 ASSESSMENT — PAIN SCALES - GENERAL: PAINLEVEL: NO PAIN (0)

## 2019-07-15 NOTE — NURSING NOTE
"Oncology Rooming Note    July 15, 2019 5:10 PM   Remedios Osorio is a 63 year old female who presents for:    Chief Complaint   Patient presents with     Blood Draw     Labs drawn via  by RN in lab. VS taken. Patient checked in for next appt.     RECHECK     onc breat ca      Initial Vitals: /86 (BP Location: Right arm, Patient Position: Sitting, Cuff Size: Adult Regular)   Pulse 87   Temp 98.6  F (37  C) (Oral)   Resp 16   Ht 1.626 m (5' 4.02\")   Wt 104.7 kg (230 lb 12.8 oz)   LMP 10/28/2009   SpO2 96%   Breastfeeding? No   BMI 39.60 kg/m   Estimated body mass index is 39.6 kg/m  as calculated from the following:    Height as of this encounter: 1.626 m (5' 4.02\").    Weight as of this encounter: 104.7 kg (230 lb 12.8 oz). Body surface area is 2.17 meters squared.  No Pain (0) Comment: Data Unavailable   Patient's last menstrual period was 10/28/2009.  Allergies reviewed: Yes  Medications reviewed: Yes    Medications: Medication refills not needed today.  Pharmacy name entered into [employer]:    [facility] - [place], [place] - [address].  [id number] - [place], [place] - [address]  [facility] [address] [place], [place] [address] [facility] [place] [address] [facility] [place], [place] - [address] [facility] [place], [place] - [address]  [facility] [place], [place] - [address] [facility]    Clinical concerns: Compression sleeve; compression bra      Janene Richardson [facility]              "

## 2019-07-15 NOTE — PATIENT INSTRUCTIONS
Colorectal Cancer Screening: During our visit today, we discussed that it is recommended you receive colorectal cancer screening. Please call or make an appointment with your primary care provider to discuss this. You may also call the [facility] scheduling line ([phone]) to set up a colonoscopy appointment.

## 2019-07-15 NOTE — LETTER
7/15/2019       RE: Remedios Osorio  [address] [facility] [address]     Dear Colleague,    Thank you for referring your patient, Remedios Osorio, to the [facility]. Please see a copy of my visit note below.    MEDICAL ONCOLOGY FOLLOW-UP PATIENT VISIT     NAME: Remedios Osorio     DATE: 7/15/2019    PRIMARY CARE PHYSICIAN: Kae Caal    PATIENT ID: Clinical Stage II-B Breast Cancer    Oncology History: Remedios Osorio is a 63 year old female with h/o stage IIIa, [id number], ER positive, MI positive, HER2 non-amplified invasive ductal carcinoma of the left breast. Her PCP palpated a breast mass in the lower outer left breast in 01/2016. Diagnostic mammogram and ultrasound showed a mass at 4:30, 8 cm from the nipple, measuring 3.3 cm on mammogram and 2.1 cm on ultrasound. She was also found to have multiple abnormal left axillary lymph nodes. The largest one was 2.4 cm. Core needle biopsy of her left breast mass demonstrated invasive ductal carcinoma, grade 3 with extensive tumor necrosis. Axillary LN biopsy was also positive for malignancy. The tumor cells were strongly positive for estrogen receptor (> 95%) and are moderate to strongly positive for progesterone receptor (60-70%). HER2/lizz was non-amplified by FISH.  PET/CT showed the left breast mass, 5 hypermetabolic left axillary lymph nodes, indeterminate left cervical chain lymph nodes, and an indeterminate hypodensity in the dome of the liver. She received 11 cycles of weekly Taxol, the 12th was cancelled due to neuropathy. She received one cycle of ddAC but did not tolerate this well due to fatigue and generalized weakness. She declined any further chemotherapy.    Left breast lumpectomy and left axillary lymph node dissection was performed by Dr. Amezcua on 6/29/16.  Pathology showed a residual 1.6 cm grade 2, IDC in the left breast.  Surgical margins were negative.  Invasive tumor cellularity of 30%.  Tumor infiltrating  lymphocytes were estimated at 50%.  6/19 excised lymph nodes were involved with malignancy.  The largest lymph node metastasis measured 3.9 cm and had a 1 mm area of extranodal extension.  Minimal treatment related changes were noted in the lymph nodes.  Pathologic staging was [id number], or stage IIIa.  [facility] residual cancer burden was Class III.  Unfortunately she was not eligible for ARIADNA due to having received only 13 weeks of neoadjuvant chemotherapy.  Adjuvant Xeloda was recommended, she declined.  She completed radiation on 10/17/16.  She declined zometa for prevention of bone metastases.  She has been on letrozole since early 09/2016.    Interim History:   Ms. Osorio comes into clinic today for routine breast cancer followup.  She has a number of things that she would like to discuss today.  Amongst these, she reports a sensation as if there is a lump in her throat when she swallows.  She cannot palpate a lump on the external aspect of her neck; however, when she swallows, she feels as if there is a cotton ball in the throat.  She has not had any episodes of coughing or choking with eating or drinking fluids.  She has had no wheezing or shortness of breath.  Weight has been creeping up.  She states that she feels this is largely due to the fact that she has not been exercising.  She has continued on a ketogenic type diet and she feels that diet should not be the issue.        She denies concerning lumps or masses of either breast.  She has no new bone or joint aches or pains.  She continues to get a migraine every couple of weeks.  These are preceded by her classic aura.  She has a chronic history of migraines and reports the headaches are consistent with these.  She denies new neurologic changes.  She has no cough, shortness of breath or chest pain.  She reports insomnia.  She is not currently taking any medication for it.  Has previously tried melatonin at the 1 mg dose.  She also reports ongoing  hot flashes.  These are worse at night and are interfering with sleep.  The remainder of a complete 12-point review of systems is reviewed with the patient and was negative with the exception of that mentioned above.     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       PAST SURGICAL HISTORY:  Past Surgical History:   Procedure Laterality Date     CL AFF SURGICAL PATHOLOGY  2005    UTERINE LEIOMYOMA  [D25.9]     HC BIOPSY/EXCIS CERVICAL LESION W/WO FULGURATION  08-15-00,12-    endocervical polypectomy     HC TOOTH EXTRACTION W/FORCEP      wisdom teeth     LUMPECTOMY BREAST WITH SENTINEL NODE, COMBINED Left 6/29/2016    Segmental mastectomy with axillary lymph node dissection     REMOVE PORT VASCULAR ACCESS Right 6/29/2016    Procedure: REMOVE PORT VASCULAR ACCESS;  Surgeon: Gianna Amezcua MD;  Location:  OR     MEDS:  Current Outpatient Medications   Medication     Calcium Citrate-Vitamin D (CALCIUM + D PO)     IODINE, KELP, PO     letrozole (FEMARA) 2.5 MG tablet     order for DME     order for DME     order for DME     order for DME     No current facility-administered medications for this visit.      Facility-Administered Medications Ordered in Other Visits   Medication     lidocaine BUFFERED 1 % solution 10 mL       ALLERGIES:  Allergies   Allergen Reactions     Benadryl [Diphenhydramine] Other (See Comments)     Pt had severe hypotension, nausea and vasovagal type reaction to IV Benadryl.   Give PO only.      Cats      Keflex [Cephalexin]      rash        PHYSICAL EXAM:  /86 (BP Location: Right arm, Patient  Position: Sitting, Cuff Size: Adult Regular)   Pulse 87   Temp 98.6  F (37  C) (Oral)   Resp 16   Wt 104.7 kg (230 lb 12.8 oz)   LMP 10/28/2009   SpO2 96%   BMI 39.62 kg/m     LMP 10/28/2009   General:  Well appearing, obese adult female in NAD.  A&Ox3.  HEENT:  Normocephalic.  Sclera anicteric.  MMM.  No lesions of the oropharynx.  No palpable thyromegaly.  Lymph:  No palpable cervical, supraclavicular, or axillary LAD.  Chest:  CTA bilaterally.  No wheezes or crackles.  CV:  Regularly irregular.  No m/r/g.  Breast:  Bilateral breasts are of normal fibroglandular density.  Radiation skin changes of the left breast including skin thickening and enhancement of pores, most prominent over the inferior aspect of the breast.  There are no discretely palpable masses in either breast.  Bilateral nipples are everted.  No nipple discharge.  Abd:  Soft/NT/ND.  BSs normoactive.    Musculo:  Full ROM of the bilateral upper extremities.   Neuro:  Cranial nerves grossly intact. Gait stable.  Psych: Normal mood and affect.   Skin:  No visible concerning skin rashes or lesions.    LABS REVIEWED THIS VISIT:  7/16/19 Labs:  Blood counts are wnl.  Electrolytes, creatinine, and LFTs are wnl.    EKG - PVCs.  No other arrythmia    IMPRESSION/PLAN: Ms. Osorio is a 63 year old female with stage IIIa, C4yP2L9, ER/NM positive, HER2 negative left breast cancer. She is s/p 11 weeks of weekly taxol, 1 cycle of ddAC, left breast lumpectomy, ALND, and adjuvant radiation.  She has been on letrozole since early September, 2016.      1.  Stage III breast cancer:  Remedios is >3 years out from excision of a left breast cancer.  She continues on letrozole.  She is overall tolerating the medication well.  We plan to continue letrozole to complete a total of 10 years of endocrine therapy (through 09/2026).  There is no evidence of disease recurrence on exam today. I will see her back in 6 months for her next visit.  Next mammogram will be due in  "03/2020.    2.  Bone Health:  DEXA bone density scan in 10/2018 showed a lowest T-score of -0.1.  I recommended initiation of zometa for prevention of breast cancer bone metastases.  She initially declined starting zometa.  Today, she is amenable to starting it.  As she had a tooth pulled this Spring, will plan to start in September.  We reviewed the potential side effects again including risk of flu-like illness in the days following, arthralgias, and risk of osteonecrosis of the jaw.  In the meantime, will continue calcium 1200 mg and vitamin D 1000 IU PO daily.      3.  Lymphedema:  Worse since last admission.   Recommend return to lymphedema clinic.  Patient given a DME to obtain a new compression sleeve, glove, and bra today.      4.  Dysphagia:  Feels like a \"cotton ball in the throat\" with swallowing.  Will obtain ultrasound of neck to ensure no external compression by lymph nodes or the thyroid.  If ultrasound is without concerning finding, would recommend referral for endoscopy as per patient symptom has now been present for months.    5.  Weight management:  We spent quite some time discussing this today.  She is slowly gaining back the weight that she previously lost.  We reviewed the weight management clinics we have available at the [facility] including the medical weight management, 24 week healthy eating plan, and lifestyle management clinics.  She declines referral at this time.  She will re-introduce exercise into her daily routine.  I also advised her to count calories for a 1-2 week period of time, then eliminate 300 kilocalories per day, which should help her to lose approximately 3-4 pounds per month.    6.  Regularly irregular heart rhythm:  EKG performed today and shows PVCs.  Will discuss with PCP.  No other intervention necessary at this time.    7.  Migraines:  Headaches are typical of that of known migraines with associated aura.  Ongoing follow up with PCP.    8.  Insomnia:  Discussed " "sleep hygiene.  Recommended a sleep study to evaluate for sleep apnea; she declines at this time.  Discussed options for sleep aides.  She would like to only use \"natural\" sleep aides and therefore plans to resume melatonin use at the 3 mg dose at this time.    9.  Hot flashes:  Discussed options for treatment with an serotonin specific reuptake inhibitor vs gabapentin.  Discussed given hot flashes are worse at night and interfering with sleep, gabapentin may be a good option because it has potential associated side effect of drowsiness.  She declined a prescription for gabapentin at this time, but will consider it if still insomnia despite melatonin.    10.  Follow Up:  Ultrasound neck within 1 week.  Labs and zometa infusion mid- end of September (patient requests on a Thursday).  Return to clinic in approximately 6 months for labs and visit with me.    It was a pleasure to see Ms. Osorio in clinic today.  A total of 30 minutes of our 40 minute face to face visit was spent in counseling.        Again, thank you for allowing me to participate in the care of your patient.      Sincerely,    Kanchan Patel MD      "

## 2019-07-15 NOTE — NURSING NOTE
Chief Complaint   Patient presents with     Blood Draw     Labs drawn via  by RN in lab. VS taken. Patient checked in for next appt.     Labs collected from venipuncture by RN. Vitals taken. Checked in for appointment.    Adrienne Neves RN

## 2019-07-16 LAB — INTERPRETATION ECG - MUSE: NORMAL

## 2019-07-26 ENCOUNTER — ANCILLARY PROCEDURE (OUTPATIENT)
Dept: ULTRASOUND IMAGING | Facility: CLINIC | Age: 64
End: 2019-07-26
Attending: INTERNAL MEDICINE
Payer: COMMERCIAL

## 2019-07-26 DIAGNOSIS — R13.10 DYSPHAGIA, UNSPECIFIED TYPE: ICD-10-CM

## 2019-09-25 DIAGNOSIS — Z79.899 ENCOUNTER FOR LONG-TERM (CURRENT) USE OF MEDICATIONS: ICD-10-CM

## 2019-09-25 DIAGNOSIS — C50.512 MALIGNANT NEOPLASM OF LOWER-OUTER QUADRANT OF LEFT BREAST OF FEMALE, ESTROGEN RECEPTOR POSITIVE (H): ICD-10-CM

## 2019-09-25 DIAGNOSIS — Z17.0 MALIGNANT NEOPLASM OF LOWER-OUTER QUADRANT OF LEFT BREAST OF FEMALE, ESTROGEN RECEPTOR POSITIVE (H): ICD-10-CM

## 2019-09-25 RX ORDER — ZOLEDRONIC ACID 0.04 MG/ML
4 INJECTION, SOLUTION INTRAVENOUS ONCE
Status: CANCELLED | OUTPATIENT
Start: 2019-09-26

## 2019-09-26 ENCOUNTER — APPOINTMENT (OUTPATIENT)
Dept: LAB | Facility: CLINIC | Age: 64
End: 2019-09-26
Attending: INTERNAL MEDICINE
Payer: COMMERCIAL

## 2019-09-26 ENCOUNTER — INFUSION THERAPY VISIT (OUTPATIENT)
Dept: ONCOLOGY | Facility: CLINIC | Age: 64
End: 2019-09-26
Attending: INTERNAL MEDICINE
Payer: COMMERCIAL

## 2019-09-26 VITALS
OXYGEN SATURATION: 97 % | HEART RATE: 78 BPM | RESPIRATION RATE: 16 BRPM | BODY MASS INDEX: 39.89 KG/M2 | SYSTOLIC BLOOD PRESSURE: 143 MMHG | DIASTOLIC BLOOD PRESSURE: 88 MMHG | TEMPERATURE: 98.6 F | WEIGHT: 232.5 LBS

## 2019-09-26 DIAGNOSIS — Z79.899 ENCOUNTER FOR LONG-TERM (CURRENT) USE OF MEDICATIONS: Primary | ICD-10-CM

## 2019-09-26 DIAGNOSIS — C50.512 MALIGNANT NEOPLASM OF LOWER-OUTER QUADRANT OF LEFT BREAST OF FEMALE, ESTROGEN RECEPTOR POSITIVE (H): ICD-10-CM

## 2019-09-26 DIAGNOSIS — Z17.0 MALIGNANT NEOPLASM OF LOWER-OUTER QUADRANT OF LEFT BREAST OF FEMALE, ESTROGEN RECEPTOR POSITIVE (H): ICD-10-CM

## 2019-09-26 LAB
ALBUMIN SERPL-MCNC: 3.8 G/DL (ref 3.4–5)
CALCIUM SERPL-MCNC: 9.1 MG/DL (ref 8.5–10.1)
CREAT SERPL-MCNC: 0.73 MG/DL (ref 0.52–1.04)
GFR SERPL CREATININE-BSD FRML MDRD: 88 ML/MIN/{1.73_M2}

## 2019-09-26 PROCEDURE — 82040 ASSAY OF SERUM ALBUMIN: CPT | Performed by: INTERNAL MEDICINE

## 2019-09-26 PROCEDURE — 25000128 H RX IP 250 OP 636: Mod: ZF | Performed by: INTERNAL MEDICINE

## 2019-09-26 PROCEDURE — 96365 THER/PROPH/DIAG IV INF INIT: CPT

## 2019-09-26 PROCEDURE — 82565 ASSAY OF CREATININE: CPT | Performed by: INTERNAL MEDICINE

## 2019-09-26 PROCEDURE — 82310 ASSAY OF CALCIUM: CPT | Performed by: INTERNAL MEDICINE

## 2019-09-26 RX ORDER — ZOLEDRONIC ACID 0.04 MG/ML
4 INJECTION, SOLUTION INTRAVENOUS ONCE
Status: COMPLETED | OUTPATIENT
Start: 2019-09-26 | End: 2019-09-26

## 2019-09-26 RX ADMIN — ZOLEDRONIC ACID 4 MG: 0.04 INJECTION, SOLUTION INTRAVENOUS at 11:08

## 2019-09-26 RX ADMIN — SODIUM CHLORIDE 250 ML: 9 INJECTION, SOLUTION INTRAVENOUS at 11:08

## 2019-09-26 ASSESSMENT — PAIN SCALES - GENERAL: PAINLEVEL: NO PAIN (0)

## 2019-09-26 NOTE — PATIENT INSTRUCTIONS
[facility] Triage and after hours / weekends / holidays:  [phone]    Please call the triage or after hours line if you experience a temperature greater than or equal to 100.5, shaking chills, have uncontrolled nausea, vomiting and/or diarrhea, dizziness, shortness of breath, chest pain, bleeding, unexplained bruising, or if you have any other new/concerning symptoms, questions or concerns.      If you are having any concerning symptoms or wish to speak to a provider before your next infusion visit, please call your care coordinator or triage to notify them so we can adequately serve you.     If you need a refill on a narcotic prescription or other medication, please call before your infusion appointment.           September 2019 Sunday Monday Tuesday Wednesday Thursday Friday Saturday   1     2     3     4     5     6     7       8     9     10     11     12     13     14       15     16     17     18     19     20     21       22     23     24     25     26    [facility]  10[facility]00 AM   (15 min.)   [facility] LAB DRAW   [facility] Lab Draw    [facility] ONC INFUSION 60  10:30 AM   (60 min.)    ONCOLOGY INFUSION   [facility] 27 [phone] 30 October 2019 Sunday Monday Tuesday Wednesday Thursday Friday Saturday             1     2     3     4     5       6     7     8     9     10     11     12       13     14     15     16     17     18     19       20     21     22     23     24     25     26       27     28     29     30     31                              Recent Results (from the past 24 hour(s))   Creatinine    Collection Time: 09/26/19 10:26 AM   Result Value Ref Range    Creatinine 0.73 0.52 - 1.04 mg/dL    GFR Estimate 88 >60 mL/min/[1.73_m2]    GFR Estimate If Black >90 >60 mL/min/[1.73_m2]   Calcium    Collection Time: 09/26/19 10:26 AM   Result Value Ref Range    Calcium 9.1 8.5 - 10.1 mg/dL   Albumin level     Collection Time: 09/26/19 10:26 AM   Result Value Ref Range    Albumin 3.8 3.4 - 5.0 g/dL

## 2019-09-26 NOTE — NURSING NOTE
Chief Complaint   Patient presents with     Blood Draw     Labs drawn via PIV placed by RN in lab. VS taken. Pt checked in for next appt     Labs drawn from PIV placed by RN. Line flushed with saline. Vitals taken. Pt checked in for appointment(s).    Christiana CHU RN PHN BSN  BMT/Oncology Lab

## 2019-09-26 NOTE — PROGRESS NOTES
Infusion Nursing Note:  Remedios Osorio presents today for zometa.    Patient seen by provider today: No   present during visit today: Not Applicable.    Note: Zometa is new for patient today. She confirms that she has received teaching about the new infusion and admits to anxiety. RN reviewed potential side effects of infusion and offered reassurance. Patient confirms that she does not have any current dental issues. She is not currently taking a calcium with vitamin D supplement but will resume again.     Intravenous Access:  Peripheral IV placed in lab    Treatment Conditions:   Lab Results   Component Value Date    CR 0.73 09/26/2019                   Lab Results   Component Value Date    ANAYELI 9.1 09/26/2019                Lab Results                      Lab Results   Component Value Date    ALBUMIN 3.8 09/26/2019                      Results reviewed, labs MET treatment parameters, ok to proceed with treatment.      Post Infusion Assessment:  Patient tolerated infusion without incident.  Blood return noted pre and post infusion.  Site patent and intact, free from redness, edema or discomfort.  No evidence of extravasations.  Access discontinued per protocol.       Discharge Plan:   Patient declined prescription refills.  Discharge instructions reviewed with: Patient.  Patient and/or family verbalized understanding of discharge instructions and all questions answered.  AVS to patient via [employer].  Patient will return 1/21/20 for next appointment with Dr Patel  Patient discharged in stable condition accompanied by: self.  Departure Mode: Ambulatory.  Face to Face time: 0.    Rosa Matthews, RN, RN

## 2019-10-22 ENCOUNTER — OFFICE VISIT (OUTPATIENT)
Dept: FAMILY MEDICINE | Facility: CLINIC | Age: 64
End: 2019-10-22
Payer: COMMERCIAL

## 2019-10-22 VITALS
TEMPERATURE: 98.9 F | DIASTOLIC BLOOD PRESSURE: 84 MMHG | BODY MASS INDEX: 43.95 KG/M2 | OXYGEN SATURATION: 98 % | SYSTOLIC BLOOD PRESSURE: 114 MMHG | HEART RATE: 77 BPM | WEIGHT: 232.8 LBS | HEIGHT: 61 IN | RESPIRATION RATE: 18 BRPM

## 2019-10-22 DIAGNOSIS — E66.01 MORBID OBESITY, UNSPECIFIED OBESITY TYPE (H): ICD-10-CM

## 2019-10-22 DIAGNOSIS — L03.011 PARONYCHIA OF RIGHT RING FINGER: Primary | ICD-10-CM

## 2019-10-22 PROCEDURE — 99203 OFFICE O/P NEW LOW 30 MIN: CPT | Performed by: FAMILY MEDICINE

## 2019-10-22 RX ORDER — SULFAMETHOXAZOLE/TRIMETHOPRIM 800-160 MG
1 TABLET ORAL 2 TIMES DAILY
Qty: 20 TABLET | Refills: 0 | Status: SHIPPED | OUTPATIENT
Start: 2019-10-22 | End: 2019-11-01

## 2019-10-22 ASSESSMENT — MIFFLIN-ST. JEOR: SCORE: 1552.47

## 2019-10-22 NOTE — PATIENT INSTRUCTIONS
Patient Education     Paronychia of the Finger or Toe  Paronychia is an infection near a fingernail or toenail. It usually occurs when an opening in the cuticle or an ingrown toenail lets bacteria under the skin.  The infection will need to be drained if pus is present. If the infection has been caught early, you may need only antibiotic treatment. Healing will take about 1 to 2 weeks.  Home care  Follow these guidelines when caring for yourself at home:    Clean and soak the toe or finger. Do this 2 times a day for the first 3 days. To do so:  ? Soak your foot or hand in a tub of warm water for 5 minutes. Or hold your toe or finger under a faucet of warm running water for 5 minutes.  ? Clean any crust away with soap and water using a cotton swab.  ? Put antibiotic ointment on the infected area.    Change the dressing daily or any time it gets dirty.    If you were given antibiotics, take them as directed until they are all gone.    If your infection is on a toe, wear comfortable shoes with a lot of toe room. You can also wear open-toed sandals while your toe heals.    You may use over-the-counter medicine (acetaminophen or ibuprofen to help with pain, unless another medicine was prescribed. If you have chronic liver or kidney disease, talk with your healthcare provider before using these medicines. Also talk with your provider if you've had a stomach ulcer or GI (gastrointestinal) bleeding.  Prevention  The following can prevent paronychia:    Avoid cutting or playing with your cuticles at home.    Don't bite your nails.    Don't suck on your thumbs or fingers.  Follow-up care  Follow up with your healthcare provider, or as advised.  When to seek medical advice  Call your healthcare provider right away if any of these occur:    Redness, pain, or swelling of the finger or toe gets worse    Red streaks in the skin leading away from the wound    Pus or fluid draining from the nail area    Fever of 100.4 F (38 C) or  higher, or as directed by your provider  Date Last Reviewed: 8/1/2016 2000-2018 [employer]. [address] [place] [address], [place] [address]. All rights reserved. This information is not intended as a substitute for professional medical care. Always follow your healthcare professional's instructions.

## 2019-10-22 NOTE — PROGRESS NOTES
"Subjective     Remedios Osorio is a 64 year old female who presents to clinic today for the following health issues:    HPI   Chief Complaint   Patient presents with     Infection     right hand ring finger; sulfamethoxazole-trimethoprim (BACTRIM DS/SEPTRA DS) 800-160 MG per tablet was given last time this happened         Right 4th digit  Yesterday morning clipped hangnail on finger  yesterdsay all day was sore  Last night, was getting more swollen, puffy, painful  Soaked in hot salt water  Little bit of white pus came out  Applied bacitracin, covered with bandaid then went to bed  BP Readings from Last 3 Encounters:   10/22/19 114/84   09/26/19 (!) 143/88   07/15/19 135/86    Wt Readings from Last 3 Encounters:   10/22/19 105.6 kg (232 lb 12.8 oz)   09/26/19 105.5 kg (232 lb 8 oz)   07/15/19 104.7 kg (230 lb 12.8 oz)        Reviewed and updated as needed this visit by Provider  Tobacco  Allergies  Meds  Problems  Med Hx  Surg Hx  Fam Hx         Review of Systems   ROS COMP: Constitutional, HEENT, cardiovascular, pulmonary, gi and gu systems are negative, except as otherwise noted.      Objective    /84   Pulse 77   Temp 98.9  F (37.2  C) (Oral)   Resp 18   Ht 1.548 m (5' 0.95\")   Wt 105.6 kg (232 lb 12.8 oz)   LMP 10/28/2009   SpO2 98%   BMI 44.07 kg/m    Body mass index is 44.07 kg/m .  Physical Exam   GENERAL: healthy, alert and no distress  EYES: Eyes grossly normal to inspection, PERRL and conjunctivae and sclerae normal  NECK: no adenopathy, no asymmetry, masses, or scars and thyroid normal to palpation  RESP: lungs clear to auscultation - no rales, rhonchi or wheezes  CV: regular rate and rhythm, normal S1 S2, no S3 or S4, no murmur, click or rub, no peripheral edema and peripheral pulses strong  SKIN: erythema/swelling/tenderness base of nail 4th digit right hand  PSYCH: mentation appears normal, affect normal/bright          Assessment & Plan       ICD-10-CM    1. Paronychia of right " ring finger L03.011 sulfamethoxazole-trimethoprim (BACTRIM DS/SEPTRA DS) 800-160 MG tablet   2. Morbid obesity, unspecified obesity type (H) E66.01      Will give bactrim as it had been effective for her in the past  Recommend she continue soaking in warm water/epsom salt  Monitor for spread       Follow up if symptoms worsen or fail to improve.   Rolando Ellison MD  [facility]

## 2019-11-04 ENCOUNTER — HEALTH MAINTENANCE LETTER (OUTPATIENT)
Age: 64
End: 2019-11-04

## 2020-01-20 NOTE — PROGRESS NOTES
MEDICAL ONCOLOGY FOLLOW-UP PATIENT VISIT     NAME: Remedios Osorio     DATE: 1/21/2020    PRIMARY CARE PHYSICIAN: Kae Caal    PATIENT ID: Clinical Stage II-B Breast Cancer    Oncology History: Remedios Osorio is a 64 year old female with h/o stage IIIa, [id number], ER positive, MO positive, HER2 non-amplified invasive ductal carcinoma of the left breast. Her PCP palpated a breast mass in the lower outer left breast in 01/2016. Diagnostic mammogram and ultrasound showed a mass at 4:30, 8 cm from the nipple, measuring 3.3 cm on mammogram and 2.1 cm on ultrasound. She was also found to have multiple abnormal left axillary lymph nodes. The largest one was 2.4 cm. Core needle biopsy of her left breast mass demonstrated invasive ductal carcinoma, grade 3 with extensive tumor necrosis. Axillary LN biopsy was also positive for malignancy. The tumor cells were strongly positive for estrogen receptor (> 95%) and are moderate to strongly positive for progesterone receptor (60-70%). HER2/lizz was non-amplified by FISH.  PET/CT showed the left breast mass, 5 hypermetabolic left axillary lymph nodes, indeterminate left cervical chain lymph nodes, and an indeterminate hypodensity in the dome of the liver. She received 11 cycles of weekly Taxol, the 12th was cancelled due to neuropathy. She received one cycle of ddAC but did not tolerate this well due to fatigue and generalized weakness. She declined any further chemotherapy.    Left breast lumpectomy and left axillary lymph node dissection was performed by Dr. Amezcua on 6/29/16.  Pathology showed a residual 1.6 cm grade 2, IDC in the left breast.  Surgical margins were negative.  Invasive tumor cellularity of 30%.  Tumor infiltrating lymphocytes were estimated at 50%.  6/19 excised lymph nodes were involved with malignancy.  The largest lymph node metastasis measured 3.9 cm and had a 1 mm area of extranodal extension.  Minimal treatment related changes were noted in the  lymph nodes.  Pathologic staging was [id number], or stage IIIa.  [facility] residual cancer burden was Class III.  Unfortunately she was not eligible for ARIADNA due to having received only 13 weeks of neoadjuvant chemotherapy.  Adjuvant Xeloda was recommended, she declined.  She completed radiation on 10/17/16.  She declined zometa for prevention of bone metastases.  She has been on letrozole since early 09/2016.    Interim History:   Here for routine 6-month follow-up. Has been missing a few doses of letrozole lately due to changes in her routine, sometimes missing a couple of doses in a row. Also having some issues with sleep lately. Still getting bothersome hot flashes and night sweats, which wake her up and she has a hard time falling back asleep. Had taken melatonin with some benefit in the past and will try this again. Weight is stable since last check but has gained a significant amount of weight after impressive weight loss on a ketogenic diet a couple of years ago. Attributes this to stress as sister and nephew are living with her lately and this has been pretty difficult. No new lumps of bumps. Has noticed a skin lesion on the left lateral aspect of her breast that she thought was a scab but it keeps coming back. Remainder of a 12-point ROS was otherwise negative.    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       PAST SURGICAL HISTORY:  Past Surgical History:   Procedure Laterality Date     CL AFF SURGICAL PATHOLOGY  2005    UTERINE LEIOMYOMA  [D25.9]     HC BIOPSY/EXCIS CERVICAL LESION W/WO FULGURATION  08-15-00,12-     "endocervical polypectomy     HC TOOTH EXTRACTION W/FORCEP      wisdom teeth     LUMPECTOMY BREAST WITH SENTINEL NODE, COMBINED Left 6/29/2016    Segmental mastectomy with axillary lymph node dissection     REMOVE PORT VASCULAR ACCESS Right 6/29/2016    Procedure: REMOVE PORT VASCULAR ACCESS;  Surgeon: Gianna Amezcua MD;  Location:  OR     MEDS:  Current Outpatient Medications   Medication     Calcium Citrate-Vitamin D (CALCIUM + D PO)     letrozole (FEMARA) 2.5 MG tablet     order for DME     No current facility-administered medications for this visit.      Facility-Administered Medications Ordered in Other Visits   Medication     lidocaine BUFFERED 1 % solution 10 mL       ALLERGIES:  Allergies   Allergen Reactions     Benadryl [Diphenhydramine] Other (See Comments)     Pt had severe hypotension, nausea and vasovagal type reaction to IV Benadryl.   Give PO only.      Cats      Keflex [Cephalexin]      rash        PHYSICAL EXAM:  BP (!) 151/81   Pulse 85   Temp 98.7  F (37.1  C)   Resp 20   Ht 1.548 m (5' 0.95\")   Wt 105.7 kg (233 lb)   LMP 10/28/2009   SpO2 98%   BMI 44.10 kg/m    General:  Well appearing, obese adult female in NAD.  A&Ox3.  HEENT:  Normocephalic.  Sclera anicteric.  MMM.  No lesions of the oropharynx.  No palpable thyromegaly.  Lymph:  No palpable cervical, supraclavicular, or axillary LAD.  Chest:  CTA bilaterally.  No wheezes or crackles.  CV:  Regularly irregular.  No m/r/g.  Breast:  Bilateral breasts are of normal fibroglandular density.  Subtle adiation skin changes of the left breast including skin thickening and enhancement of pores, most prominent over the inferior aspect of the breast.  There are no discretely palpable masses in either breast.  Bilateral nipples are everted.  No nipple discharge.  Abd:  Soft/NT/ND.  BSs normoactive.    Musculo:  Full ROM of the bilateral upper extremities.   Neuro:  Cranial nerves grossly intact. Gait stable.  Psych: Normal mood and " affect.   Skin:  Seborrheic keratosis measuring ~1 cm along the inferior lateral aspect of the left breast.    LABS REVIEWED THIS VISIT:  Results for orders placed or performed in visit on 01/21/20 (from the past 24 hour(s))   Comprehensive metabolic panel   Result Value Ref Range    Sodium 137 133 - 144 mmol/L    Potassium 4.5 3.4 - 5.3 mmol/L    Chloride 106 94 - 109 mmol/L    Carbon Dioxide 26 20 - 32 mmol/L    Anion Gap 5 3 - 14 mmol/L    Glucose 90 70 - 99 mg/dL    Urea Nitrogen 21 7 - 30 mg/dL    Creatinine 0.79 0.52 - 1.04 mg/dL    GFR Estimate 80 >60 mL/min/[1.73_m2]    GFR Estimate If Black >90 >60 mL/min/[1.73_m2]    Calcium 9.4 8.5 - 10.1 mg/dL    Bilirubin Total 0.4 0.2 - 1.3 mg/dL    Albumin 3.8 3.4 - 5.0 g/dL    Protein Total 7.5 6.8 - 8.8 g/dL    Alkaline Phosphatase 54 40 - 150 U/L    ALT 27 0 - 50 U/L    AST 14 0 - 45 U/L   CBC with platelets differential   Result Value Ref Range    WBC 4.4 4.0 - 11.0 10e9/L    RBC Count 4.23 3.8 - 5.2 10e12/L    Hemoglobin 12.9 11.7 - 15.7 g/dL    Hematocrit 38.7 35.0 - 47.0 %    MCV 92 78 - 100 fl    MCH 30.5 26.5 - 33.0 pg    MCHC 33.3 31.5 - 36.5 g/dL    RDW 14.2 10.0 - 15.0 %    Platelet Count 232 150 - 450 10e9/L    Diff Method Automated Method     % Neutrophils 59.1 %    % Lymphocytes 25.6 %    % Monocytes 9.4 %    % Eosinophils 4.6 %    % Basophils 1.1 %    % Immature Granulocytes 0.2 %    Nucleated RBCs 0 0 /100    Absolute Neutrophil 2.6 1.6 - 8.3 10e9/L    Absolute Lymphocytes 1.1 0.8 - 5.3 10e9/L    Absolute Monocytes 0.4 0.0 - 1.3 10e9/L    Absolute Eosinophils 0.2 0.0 - 0.7 10e9/L    Absolute Basophils 0.1 0.0 - 0.2 10e9/L    Abs Immature Granulocytes 0.0 0 - 0.4 10e9/L    Absolute Nucleated RBC 0.0        IMPRESSION/PLAN: Ms. Osorio is a 64 year old female with stage IIIa, [id number], ER/MS positive, HER2 negative left breast cancer. She is s/p 11 weeks of weekly taxol, 1 cycle of ddAC, left breast lumpectomy, ALND, and adjuvant radiation.  She has been  on letrozole since early September, 2016.      1.  Stage III breast cancer:  Remedios is 3 years, 7 months out from excision of a left breast cancer.  She continues on letrozole.  We plan to continue letrozole to complete a total of 10 years of endocrine therapy (through 09/2026).  We also discussed the importance of compliance with this regimen as intermittent use is clearly inferior.  She will work on making this part of her morning routine again and will try to not miss any more doses.  There is no evidence of disease recurrence on exam today.  We will see her back in 6 months for her next visit.  Next mammogram will be due in 03/2020, we will schedule this for her today.    2.  Bone Health:  DEXA in 10/2018 with a lowest T-score of -0.1.  She started zometa 9/26/19 for prevention of breast cancer bone metastases.  She tolerated this well overall.  We plan to treat her for a total of 2 years.  Due for next dose around 3/24/20.    3.  Insomnia:  Has struggled with this off and on since starting letrozole.  Has noted benefit with melatonin in the past and will try this again given recent struggles with sleep.  Recommended melatonin 3 mg at bedtime.    4.  Hot flashes:  Bothersome hot flashes and night sweats while on letrozole.  Seem to be interfering with sleep as they wake her up and she has a hard time getting back to sleep.  We discussed the use of gabapentin 300 mg at night in this setting (given possible added benefit of sedation), but she declines for now.    5.  Lymphedema:  Follow up with lymphedema clinic prn.    6.  Dysphagia:  Resolved. Had a neck US in July 2019 that showed a stable calcified left thyroid nodule measuring up to 1.3 cm (stable since 2016). This was not hypermetabolic on last PET/CT.    7.  Weight:  Significant weight gain over the last 2 years following impressive weight loss from 2015 to 2017 while on a ketogenic diet.  She attributes this to stress and will work on improving her diet and  activity level.     8.  Follow Up:  Bilateral screening mammograms and zometa infusion around 3/24/2020.  Return in 6 months for labs and visit with me.      Patient was seen and discussed with Dr. Patel.    Jocelin Aguiar MD/PhD  Heme/Onc Fellow    Patient was seen and discussed with Dr. Aguiar.  I have edited the above note to reflect our joint assessment and plan.  I spent a total of 30 minutes in face to face time with the patient, >50% of which was spent in counseling.    Kanchan Patel MD

## 2020-01-21 ENCOUNTER — ONCOLOGY VISIT (OUTPATIENT)
Dept: ONCOLOGY | Facility: CLINIC | Age: 65
End: 2020-01-21
Attending: INTERNAL MEDICINE
Payer: COMMERCIAL

## 2020-01-21 ENCOUNTER — APPOINTMENT (OUTPATIENT)
Dept: LAB | Facility: CLINIC | Age: 65
End: 2020-01-21
Attending: INTERNAL MEDICINE
Payer: COMMERCIAL

## 2020-01-21 VITALS
SYSTOLIC BLOOD PRESSURE: 151 MMHG | HEIGHT: 61 IN | OXYGEN SATURATION: 98 % | BODY MASS INDEX: 43.99 KG/M2 | HEART RATE: 85 BPM | DIASTOLIC BLOOD PRESSURE: 81 MMHG | WEIGHT: 233 LBS | TEMPERATURE: 98.7 F | RESPIRATION RATE: 20 BRPM

## 2020-01-21 DIAGNOSIS — C50.512 MALIGNANT NEOPLASM OF LOWER-OUTER QUADRANT OF LEFT BREAST OF FEMALE, ESTROGEN RECEPTOR POSITIVE (H): Primary | ICD-10-CM

## 2020-01-21 DIAGNOSIS — Z17.0 MALIGNANT NEOPLASM OF LOWER-OUTER QUADRANT OF LEFT BREAST OF FEMALE, ESTROGEN RECEPTOR POSITIVE (H): Primary | ICD-10-CM

## 2020-01-21 DIAGNOSIS — Z92.21 STATUS POST CHEMOTHERAPY: ICD-10-CM

## 2020-01-21 DIAGNOSIS — Z12.31 VISIT FOR SCREENING MAMMOGRAM: ICD-10-CM

## 2020-01-21 LAB
ALBUMIN SERPL-MCNC: 3.8 G/DL (ref 3.4–5)
ALP SERPL-CCNC: 54 U/L (ref 40–150)
ALT SERPL W P-5'-P-CCNC: 27 U/L (ref 0–50)
ANION GAP SERPL CALCULATED.3IONS-SCNC: 5 MMOL/L (ref 3–14)
AST SERPL W P-5'-P-CCNC: 14 U/L (ref 0–45)
BASOPHILS # BLD AUTO: 0.1 10E9/L (ref 0–0.2)
BASOPHILS NFR BLD AUTO: 1.1 %
BILIRUB SERPL-MCNC: 0.4 MG/DL (ref 0.2–1.3)
BUN SERPL-MCNC: 21 MG/DL (ref 7–30)
CALCIUM SERPL-MCNC: 9.4 MG/DL (ref 8.5–10.1)
CHLORIDE SERPL-SCNC: 106 MMOL/L (ref 94–109)
CO2 SERPL-SCNC: 26 MMOL/L (ref 20–32)
CREAT SERPL-MCNC: 0.79 MG/DL (ref 0.52–1.04)
DIFFERENTIAL METHOD BLD: NORMAL
EOSINOPHIL # BLD AUTO: 0.2 10E9/L (ref 0–0.7)
EOSINOPHIL NFR BLD AUTO: 4.6 %
ERYTHROCYTE [DISTWIDTH] IN BLOOD BY AUTOMATED COUNT: 14.2 % (ref 10–15)
GFR SERPL CREATININE-BSD FRML MDRD: 80 ML/MIN/{1.73_M2}
GLUCOSE SERPL-MCNC: 90 MG/DL (ref 70–99)
HCT VFR BLD AUTO: 38.7 % (ref 35–47)
HGB BLD-MCNC: 12.9 G/DL (ref 11.7–15.7)
IMM GRANULOCYTES # BLD: 0 10E9/L (ref 0–0.4)
IMM GRANULOCYTES NFR BLD: 0.2 %
LYMPHOCYTES # BLD AUTO: 1.1 10E9/L (ref 0.8–5.3)
LYMPHOCYTES NFR BLD AUTO: 25.6 %
MCH RBC QN AUTO: 30.5 PG (ref 26.5–33)
MCHC RBC AUTO-ENTMCNC: 33.3 G/DL (ref 31.5–36.5)
MCV RBC AUTO: 92 FL (ref 78–100)
MONOCYTES # BLD AUTO: 0.4 10E9/L (ref 0–1.3)
MONOCYTES NFR BLD AUTO: 9.4 %
NEUTROPHILS # BLD AUTO: 2.6 10E9/L (ref 1.6–8.3)
NEUTROPHILS NFR BLD AUTO: 59.1 %
NRBC # BLD AUTO: 0 10*3/UL
NRBC BLD AUTO-RTO: 0 /100
PLATELET # BLD AUTO: 232 10E9/L (ref 150–450)
POTASSIUM SERPL-SCNC: 4.5 MMOL/L (ref 3.4–5.3)
PROT SERPL-MCNC: 7.5 G/DL (ref 6.8–8.8)
RBC # BLD AUTO: 4.23 10E12/L (ref 3.8–5.2)
SODIUM SERPL-SCNC: 137 MMOL/L (ref 133–144)
WBC # BLD AUTO: 4.4 10E9/L (ref 4–11)

## 2020-01-21 PROCEDURE — G0463 HOSPITAL OUTPT CLINIC VISIT: HCPCS | Mod: ZF

## 2020-01-21 PROCEDURE — 36415 COLL VENOUS BLD VENIPUNCTURE: CPT

## 2020-01-21 PROCEDURE — 85025 COMPLETE CBC W/AUTO DIFF WBC: CPT | Performed by: INTERNAL MEDICINE

## 2020-01-21 PROCEDURE — 99214 OFFICE O/P EST MOD 30 MIN: CPT | Mod: ZP | Performed by: INTERNAL MEDICINE

## 2020-01-21 PROCEDURE — 80053 COMPREHEN METABOLIC PANEL: CPT | Performed by: INTERNAL MEDICINE

## 2020-01-21 ASSESSMENT — MIFFLIN-ST. JEOR: SCORE: 1553.46

## 2020-01-21 ASSESSMENT — PAIN SCALES - GENERAL: PAINLEVEL: NO PAIN (0)

## 2020-01-21 NOTE — PATIENT INSTRUCTIONS
Remedios,     If you wanted to consider a medication for insomnia and the hot flashes you are having at night, you could consider taking gabapentin 300 mg at night.  Please let me know if you decide you would like this prescription.    Dr. Patel

## 2020-01-21 NOTE — NURSING NOTE
"Oncology Rooming Note    January 21, 2020 10:16 AM   Remedios Osorio is a 64 year old female who presents for:    Chief Complaint   Patient presents with     Oncology Clinic Visit     [facility] RETURN; BREAST CANCER     Lab Only     venipuncture, vitals checked     Initial Vitals: BP (!) 151/81   Pulse 85   Temp 98.7  F (37.1  C)   Resp 20   Ht 1.548 m (5' 0.95\")   Wt 105.7 kg (233 lb)   LMP 10/28/2009   SpO2 98%   BMI 44.10 kg/m   Estimated body mass index is 44.1 kg/m  as calculated from the following:    Height as of this encounter: 1.548 m (5' 0.95\").    Weight as of this encounter: 105.7 kg (233 lb). Body surface area is 2.13 meters squared.  No Pain (0) Comment: Data Unavailable   Patient's last menstrual period was 10/28/2009.  Allergies reviewed: Yes  Medications reviewed: Yes    Medications: Medication refills not needed today.  Pharmacy name entered into Egny[employer]:    [facility] - [place], [place] - [address].  [id number] - [place], [place] - [address] [id number] [place], [place] [facility] [address] [facility] [place] - [address] [facility] [place], [place] - [address] [facility] [place], [place] - [address]  [facility] - [place], [place] - [address]    Clinical concerns: No new concerns.  Dr. Patel was notified.      Shweta Khan, [facility]              "

## 2020-01-21 NOTE — LETTER
1/21/2020       RE: Remedios Osorio  [address] [id number]     Dear Colleague,    Thank you for referring your patient, Remedios Osorio, to the [facility]. Please see a copy of my visit note below.    MEDICAL ONCOLOGY FOLLOW-UP PATIENT VISIT     NAME: Remedios Osorio     DATE: 1/21/2020    PRIMARY CARE PHYSICIAN: Kae Caal    PATIENT ID: Clinical Stage II-B Breast Cancer    Oncology History: Remedios Osorio is a 64 year old female with h/o stage IIIa, [id number], ER positive, [place] positive, HER2 non-amplified invasive ductal carcinoma of the left breast. Her PCP palpated a breast mass in the lower outer left breast in 01/2016. Diagnostic mammogram and ultrasound showed a mass at 4:30, 8 cm from the nipple, measuring 3.3 cm on mammogram and 2.1 cm on ultrasound. She was also found to have multiple abnormal left axillary lymph nodes. The largest one was 2.4 cm. Core needle biopsy of her left breast mass demonstrated invasive ductal carcinoma, grade 3 with extensive tumor necrosis. Axillary LN biopsy was also positive for malignancy. The tumor cells were strongly positive for estrogen receptor (> 95%) and are moderate to strongly positive for progesterone receptor (60-70%). HER2/lizz was non-amplified by FISH.  PET/CT showed the left breast mass, 5 hypermetabolic left axillary lymph nodes, indeterminate left cervical chain lymph nodes, and an indeterminate hypodensity in the dome of the liver. She received 11 cycles of weekly Taxol, the 12th was cancelled due to neuropathy. She received one cycle of ddAC but did not tolerate this well due to fatigue and generalized weakness. She declined any further chemotherapy.    Left breast lumpectomy and left axillary lymph node dissection was performed by Dr. Amezcua on 6/29/16.  Pathology showed a residual 1.6 cm grade 2, IDC in the left breast.  Surgical margins were negative.  Invasive tumor cellularity of 30%.  Tumor infiltrating  lymphocytes were estimated at 50%.  6/19 excised lymph nodes were involved with malignancy.  The largest lymph node metastasis measured 3.9 cm and had a 1 mm area of extranodal extension.  Minimal treatment related changes were noted in the lymph nodes.  Pathologic staging was E8pS2S0, or stage IIIa.  [facility] residual cancer burden was Class III.  Unfortunately she was not eligible for ARIADNA due to having received only 13 weeks of neoadjuvant chemotherapy.  Adjuvant Xeloda was recommended, she declined.  She completed radiation on 10/17/16.  She declined zometa for prevention of bone metastases.  She has been on letrozole since early 09/2016.    Interim History:   Here for routine 6-month follow-up. Has been missing a few doses of letrozole lately due to changes in her routine, sometimes missing a couple of doses in a row. Also having some issues with sleep lately. Still getting bothersome hot flashes and night sweats, which wake her up and she has a hard time falling back asleep. Had taken melatonin with some benefit in the past and will try this again. Weight is stable since last check but has gained a significant amount of weight after impressive weight loss on a ketogenic diet a couple of years ago. Attributes this to stress as sister and nephew are living with her lately and this has been pretty difficult. No new lumps of bumps. Has noticed a skin lesion on the left lateral aspect of her breast that she thought was a scab but it keeps coming back. Remainder of a 12-point ROS was otherwise negative.    PAST MEDICAL HISTORY:   Past Medical History:   Diagnosis Date     ABNL LIVER [facility] STUDY  W/ MONO  5/01     Breast cancer (H)      CHR BLOOD LOSS ANEMIA DUE TO FIBROIDS 6/9/2005     ELEVATED HBA1C 6.2% 6/05 8/6/2005     HX MUCOUS POLYPS OF CERVIX  2000     HYPERLIPIDEMIA       Infectious mononucleosis 5/01     MENORRHAGIA      MIGRAINE HEADACHES      MORBID OBESITY BMI 44.79 6/05 6/12/2005     UTERINE  "LEIOMYOMA  2/7/2003       PAST SURGICAL HISTORY:  Past Surgical History:   Procedure Laterality Date     CL AFF SURGICAL PATHOLOGY  2005    UTERINE LEIOMYOMA  [D25.9]     HC BIOPSY/EXCIS CERVICAL LESION W/WO FULGURATION  08-15-00,12-    endocervical polypectomy     HC TOOTH EXTRACTION W/FORCEP      wisdom teeth     LUMPECTOMY BREAST WITH SENTINEL NODE, COMBINED Left 6/29/2016    Segmental mastectomy with axillary lymph node dissection     REMOVE PORT VASCULAR ACCESS Right 6/29/2016    Procedure: REMOVE PORT VASCULAR ACCESS;  Surgeon: Gianna Amezcua MD;  Location:  OR     MEDS:  Current Outpatient Medications   Medication     Calcium Citrate-Vitamin D (CALCIUM + D PO)     letrozole (FEMARA) 2.5 MG tablet     order for DME     No current facility-administered medications for this visit.      Facility-Administered Medications Ordered in Other Visits   Medication     lidocaine BUFFERED 1 % solution 10 mL       ALLERGIES:  Allergies   Allergen Reactions     Benadryl [Diphenhydramine] Other (See Comments)     Pt had severe hypotension, nausea and vasovagal type reaction to IV Benadryl.   Give PO only.      Cats      Keflex [Cephalexin]      rash        PHYSICAL EXAM:  BP (!) 151/81   Pulse 85   Temp 98.7  F (37.1  C)   Resp 20   Ht 1.548 m (5' 0.95\")   Wt 105.7 kg (233 lb)   LMP 10/28/2009   SpO2 98%   BMI 44.10 kg/m     General:  Well appearing, obese adult female in NAD.  A&Ox3.  HEENT:  Normocephalic.  Sclera anicteric.  MMM.  No lesions of the oropharynx.  No palpable thyromegaly.  Lymph:  No palpable cervical, supraclavicular, or axillary LAD.  Chest:  CTA bilaterally.  No wheezes or crackles.  CV:  Regularly irregular.  No m/r/g.  Breast:  Bilateral breasts are of normal fibroglandular density.  Subtle adiation skin changes of the left breast including skin thickening and enhancement of pores, most prominent over the inferior aspect of the breast.  There are no discretely palpable masses in " either breast.  Bilateral nipples are everted.  No nipple discharge.  Abd:  Soft/NT/ND.  BSs normoactive.    Musculo:  Full ROM of the bilateral upper extremities.   Neuro:  Cranial nerves grossly intact. Gait stable.  Psych: Normal mood and affect.   Skin:  Seborrheic keratosis measuring ~1 cm along the inferior lateral aspect of the left breast.    LABS REVIEWED THIS VISIT:  Results for orders placed or performed in visit on 01/21/20 (from the past 24 hour(s))   Comprehensive metabolic panel   Result Value Ref Range    Sodium 137 133 - 144 mmol/L    Potassium 4.5 3.4 - 5.3 mmol/L    Chloride 106 94 - 109 mmol/L    Carbon Dioxide 26 20 - 32 mmol/L    Anion Gap 5 3 - 14 mmol/L    Glucose 90 70 - 99 mg/dL    Urea Nitrogen 21 7 - 30 mg/dL    Creatinine 0.79 0.52 - 1.04 mg/dL    GFR Estimate 80 >60 mL/min/[1.73_m2]    GFR Estimate If Black >90 >60 mL/min/[1.73_m2]    Calcium 9.4 8.5 - 10.1 mg/dL    Bilirubin Total 0.4 0.2 - 1.3 mg/dL    Albumin 3.8 3.4 - 5.0 g/dL    Protein Total 7.5 6.8 - 8.8 g/dL    Alkaline Phosphatase 54 40 - 150 U/L    ALT 27 0 - 50 U/L    AST 14 0 - 45 U/L   CBC with platelets differential   Result Value Ref Range    WBC 4.4 4.0 - 11.0 10e9/L    RBC Count 4.23 3.8 - 5.2 10e12/L    Hemoglobin 12.9 11.7 - 15.7 g/dL    Hematocrit 38.7 35.0 - 47.0 %    MCV 92 78 - 100 fl    MCH 30.5 26.5 - 33.0 pg    MCHC 33.3 31.5 - 36.5 g/dL    RDW 14.2 10.0 - 15.0 %    Platelet Count 232 150 - 450 10e9/L    Diff Method Automated Method     % Neutrophils 59.1 %    % Lymphocytes 25.6 %    % Monocytes 9.4 %    % Eosinophils 4.6 %    % Basophils 1.1 %    % Immature Granulocytes 0.2 %    Nucleated RBCs 0 0 /100    Absolute Neutrophil 2.6 1.6 - 8.3 10e9/L    Absolute Lymphocytes 1.1 0.8 - 5.3 10e9/L    Absolute Monocytes 0.4 0.0 - 1.3 10e9/L    Absolute Eosinophils 0.2 0.0 - 0.7 10e9/L    Absolute Basophils 0.1 0.0 - 0.2 10e9/L    Abs Immature Granulocytes 0.0 0 - 0.4 10e9/L    Absolute Nucleated RBC 0.0         IMPRESSION/PLAN: Ms. Osorio is a 64 year old female with stage IIIa, [id number], ER/AL positive, HER2 negative left breast cancer. She is s/p 11 weeks of weekly taxol, 1 cycle of ddAC, left breast lumpectomy, ALND, and adjuvant radiation.  She has been on letrozole since early September, 2016.      1.  Stage III breast cancer:  Remedios is 3 years, 7 months out from excision of a left breast cancer.  She continues on letrozole.  We plan to continue letrozole to complete a total of 10 years of endocrine therapy (through 09/2026).  We also discussed the importance of compliance with this regimen as intermittent use is clearly inferior.  She will work on making this part of her morning routine again and will try to not miss any more doses.  There is no evidence of disease recurrence on exam today.  We will see her back in 6 months for her next visit.  Next mammogram will be due in 03/2020, we will schedule this for her today.    2.  Bone Health:  DEXA in 10/2018 with a lowest T-score of -0.1.  She started zometa 9/26/19 for prevention of breast cancer bone metastases.  She tolerated this well overall.  We plan to treat her for a total of 2 years.  Due for next dose around 3/24/20.    3.  Insomnia:  Has struggled with this off and on since starting letrozole.  Has noted benefit with melatonin in the past and will try this again given recent struggles with sleep.  Recommended melatonin 3 mg at bedtime.    4.  Hot flashes:  Bothersome hot flashes and night sweats while on letrozole.  Seem to be interfering with sleep as they wake her up and she has a hard time getting back to sleep.  We discussed the use of gabapentin 300 mg at night in this setting (given possible added benefit of sedation), but she declines for now.    5.  Lymphedema:  Follow up with lymphedema clinic prn.    6.  Dysphagia:  Resolved. Had a neck US in July 2019 that showed a stable calcified left thyroid nodule measuring up to 1.3 cm (stable since 2016).  This was not hypermetabolic on last PET/CT.    7.  Weight:  Significant weight gain over the last 2 years following impressive weight loss from 2015 to 2017 while on a ketogenic diet.  She attributes this to stress and will work on improving her diet and activity level.     8.  Follow Up:  Bilateral screening mammograms and zometa infusion around 3/24/2020.  Return in 6 months for labs and visit with me.      Patient was seen and discussed with Dr. Patel.    Jocelin Aguiar MD/PhD  Heme/Onc Fellow    Patient was seen and discussed with Dr. Aguiar.  I have edited the above note to reflect our joint assessment and plan.  I spent a total of 30 minutes in face to face time with the patient, >50% of which was spent in counseling.    Kanchan Patel MD

## 2020-03-16 DIAGNOSIS — Z17.0 MALIGNANT NEOPLASM OF LOWER-OUTER QUADRANT OF LEFT BREAST OF FEMALE, ESTROGEN RECEPTOR POSITIVE (H): ICD-10-CM

## 2020-03-16 DIAGNOSIS — C50.512 MALIGNANT NEOPLASM OF LOWER-OUTER QUADRANT OF LEFT BREAST OF FEMALE, ESTROGEN RECEPTOR POSITIVE (H): ICD-10-CM

## 2020-03-16 RX ORDER — LETROZOLE 2.5 MG/1
2.5 TABLET, FILM COATED ORAL DAILY
Qty: 90 TABLET | Refills: 3 | Status: SHIPPED | OUTPATIENT
Start: 2020-03-16 | End: 2021-03-03

## 2020-03-22 DIAGNOSIS — Z79.899 ENCOUNTER FOR LONG-TERM (CURRENT) USE OF MEDICATIONS: Primary | ICD-10-CM

## 2020-03-22 DIAGNOSIS — C50.512 MALIGNANT NEOPLASM OF LOWER-OUTER QUADRANT OF LEFT BREAST OF FEMALE, ESTROGEN RECEPTOR POSITIVE (H): ICD-10-CM

## 2020-03-22 DIAGNOSIS — Z17.0 MALIGNANT NEOPLASM OF LOWER-OUTER QUADRANT OF LEFT BREAST OF FEMALE, ESTROGEN RECEPTOR POSITIVE (H): ICD-10-CM

## 2020-03-22 RX ORDER — ZOLEDRONIC ACID 0.04 MG/ML
4 INJECTION, SOLUTION INTRAVENOUS ONCE
Status: CANCELLED | OUTPATIENT
Start: 2020-03-24

## 2020-03-24 ENCOUNTER — INFUSION THERAPY VISIT (OUTPATIENT)
Dept: INFUSION THERAPY | Facility: CLINIC | Age: 65
End: 2020-03-24
Attending: INTERNAL MEDICINE
Payer: COMMERCIAL

## 2020-03-24 ENCOUNTER — APPOINTMENT (OUTPATIENT)
Dept: LAB | Facility: CLINIC | Age: 65
End: 2020-03-24
Attending: INTERNAL MEDICINE
Payer: COMMERCIAL

## 2020-03-24 VITALS
SYSTOLIC BLOOD PRESSURE: 138 MMHG | RESPIRATION RATE: 16 BRPM | DIASTOLIC BLOOD PRESSURE: 79 MMHG | OXYGEN SATURATION: 98 % | TEMPERATURE: 98.8 F | HEART RATE: 82 BPM | BODY MASS INDEX: 44.72 KG/M2 | WEIGHT: 236.3 LBS

## 2020-03-24 DIAGNOSIS — Z17.0 MALIGNANT NEOPLASM OF LOWER-OUTER QUADRANT OF LEFT BREAST OF FEMALE, ESTROGEN RECEPTOR POSITIVE (H): ICD-10-CM

## 2020-03-24 DIAGNOSIS — C50.512 MALIGNANT NEOPLASM OF LOWER-OUTER QUADRANT OF LEFT BREAST OF FEMALE, ESTROGEN RECEPTOR POSITIVE (H): ICD-10-CM

## 2020-03-24 DIAGNOSIS — Z79.899 ENCOUNTER FOR LONG-TERM (CURRENT) USE OF MEDICATIONS: Primary | ICD-10-CM

## 2020-03-24 PROCEDURE — 25000128 H RX IP 250 OP 636: Mod: ZF | Performed by: INTERNAL MEDICINE

## 2020-03-24 PROCEDURE — 96365 THER/PROPH/DIAG IV INF INIT: CPT

## 2020-03-24 RX ORDER — ZOLEDRONIC ACID 0.04 MG/ML
4 INJECTION, SOLUTION INTRAVENOUS ONCE
Status: COMPLETED | OUTPATIENT
Start: 2020-03-24 | End: 2020-03-24

## 2020-03-24 RX ADMIN — ZOLEDRONIC ACID 4 MG: 0.04 INJECTION, SOLUTION INTRAVENOUS at 14:50

## 2020-03-24 ASSESSMENT — PAIN SCALES - GENERAL: PAINLEVEL: NO PAIN (0)

## 2020-03-24 NOTE — PATIENT INSTRUCTIONS
Dear Remedios Osorio    Thank you for choosing [facility] for your infusion.  The following information is a summary of our appointment as well as important reminders.      What is this medicine?  ZOLEDRONIC ACID (ADI le song ik AS id) lowers the amount of calcium loss from bone. It is used to treat Paget's disease and osteoporosis in women.  This medicine may be used for other purposes; ask your health care provider or pharmacist if you have questions.  What should I tell my health care provider before I take this medicine?  They need to know if you have any of these conditions:    aspirin-sensitive asthma    cancer, especially if you are receiving medicines used to treat cancer    dental disease or wear dentures    infection    kidney disease    low levels of calcium in the blood    past surgery on the parathyroid gland or intestines    receiving corticosteroids like dexamethasone or prednisone    an unusual or allergic reaction to zoledronic acid, other medicines, foods, dyes, or preservatives    pregnant or trying to get pregnant    breast-feeding  How should I use this medicine?  This medicine is for infusion into a vein. It is given by a health care professional in a hospital or clinic setting.  Talk to your pediatrician regarding the use of this medicine in children. This medicine is not approved for use in children.  Overdosage: If you think you have taken too much of this medicine contact a poison control center or emergency room at once.  NOTE: This medicine is only for you. Do not share this medicine with others.  What if I miss a dose?  It is important not to miss your dose. Call your doctor or health care professional if you are unable to keep an appointment.  What may interact with this medicine?    certain antibiotics given by injection    NSAIDs, medicines for pain and inflammation, like ibuprofen or naproxen    some diuretics like  bumetanide, furosemide    teriparatide  This list may not describe all possible interactions. Give your health care provider a list of all the medicines, herbs, non-prescription drugs, or dietary supplements you use. Also tell them if you smoke, drink alcohol, or use illegal drugs. Some items may interact with your medicine.  What should I watch for while using this medicine?  Visit your doctor or health care professional for regular checkups. It may be some time before you see the benefit from this medicine. Do not stop taking your medicine unless your doctor tells you to. Your doctor may order blood tests or other tests to see how you are doing.  Women should inform their doctor if they wish to become pregnant or think they might be pregnant. There is a potential for serious side effects to an unborn child. Talk to your health care professional or pharmacist for more information.  You should make sure that you get enough calcium and vitamin D while you are taking this medicine. Discuss the foods you eat and the vitamins you take with your health care professional.  Some people who take this medicine have severe bone, joint, and/or muscle pain. This medicine may also increase your risk for jaw problems or a broken thigh bone. Tell your doctor right away if you have severe pain in your jaw, bones, joints, or muscles. Tell your doctor if you have any pain that does not go away or that gets worse.  Tell your dentist and dental surgeon that you are taking this medicine. You should not have major dental surgery while on this medicine. See your dentist to have a dental exam and fix any dental problems before starting this medicine. Take good care of your teeth while on this medicine. Make sure you see your dentist for regular follow-up appointments.  What side effects may I notice from receiving this medicine?  Side effects that you should report to your doctor or health care professional as soon as possible:    allergic  reactions like skin rash, itching or hives, swelling of the face, lips, or tongue    anxiety, confusion, or depression    breathing problems    changes in vision    eye pain    feeling faint or lightheaded, falls    jaw pain, especially after dental work    mouth sores    muscle cramps, stiffness, or weakness    redness, blistering, peeling or loosening of the skin, including inside the mouth    trouble passing urine or change in the amount of urine  Side effects that usually do not require medical attention (report to your doctor or health care professional if they continue or are bothersome):    bone, joint, or muscle pain    constipation    diarrhea    fever    hair loss    irritation at site where injected    loss of appetite    nausea, vomiting    stomach upset    trouble sleeping    trouble swallowing    weak or tired  This list may not describe all possible side effects. Call your doctor for medical advice about side effects. You may report side effects to FDA at 9-416-MVR-5793.  Where should I keep my medicine?  This drug is given in a hospital or clinic and will not be stored at home.  NOTE:This sheet is a summary. It may not cover all possible information. If you have questions about this medicine, talk to your doctor, pharmacist, or health care provider. Copyright  2016 Gold Standard             We look forward in seeing you on your next appointment here at [facility] ([facility]).  Please don t hesitate to call us at [phone] to reschedule any of your appointments or to speak with one of the [facility] registered nurses.  It was a pleasure taking care of you today.    Sincerely,    [facility]  [address] [place] [address]  [id number]  Phone:  [phone]

## 2020-03-24 NOTE — NURSING NOTE
Chief Complaint   Patient presents with     Blood Draw     labs drawn via piv start by rn. vs taken      Labs drawn via PIV start by rn in lab. Flushed with saline. Vitals taken. Pt checked in for next appointment.   Chiara Mason RN      Statement Selected

## 2020-03-24 NOTE — PROGRESS NOTES
Nursing Note  Remedios Osorio presents [facility] to [facility] for:   Chief Complaint   Patient presents with     Blood Draw     labs drawn via piv start by rn. vs taken      Infusion     Zometa     During today's [facility] appointment, orders from Dr. Patel were completed.  Frequency: once,every 6 months    Progress note:  Patient identification verified by name and date of birth.  Assessment completed.  Vitals recorded in Doc Flowsheets.  Patient was provided with education regarding medication/procedure and possible side effects.  Patient verbalized understanding.     present during visit today: Not Applicable.    Treatment Conditions: Corrected calcium was within parameters to proceed.   Patient not currently taking calcium/ vitamin D supplement. States ran out of med but will resume.     Premedications: were not ordered.    Drug Waste Record: No    Infusion length and rate:  infusion given over approximately 15 minutes     Labs: drawn today, prior to appointment in second floor lab.    Vascular access: peripheral IV was placed prior to appointment at [facility] today in lab.    Post Infusion Assessment:  Patient tolerated infusion without incident.  Blood return noted pre and post infusion.  Site patent and intact, free from redness, edema or discomfort.  No evidence of extravasations.  Access discontinued per protocol.     Discharge Plan:   AVS given   Follow up plan of care with: ordering provider as scheduled.  Discharge instructions were reviewed with patient.  Patient/representative verbalized understanding of discharge instructions and all questions answered.  Patient discharged from [facility] in stable condition.    Jamaal Apple RN     Administrations This Visit     zoledronic acid (ZOMETA) intermittent infusion 4 mg     Admin Date  03/24/2020 Action  New Bag Dose  4 mg  Rate  400 mL/hr Route  Intravenous Administered By  Jamaal Apple, RN              BP (!) 156/60 (BP Location: Right arm, Patient Position: Sitting, Cuff Size: Adult Large)   Pulse 111   Temp 98.8  F (37.1  C) (Oral)   Resp 16   Wt 107.2 kg (236 lb 4.8 oz)   LMP 10/28/2009   SpO2 98%   BMI 44.72 kg/m

## 2020-06-10 ENCOUNTER — ANCILLARY PROCEDURE (OUTPATIENT)
Dept: MAMMOGRAPHY | Facility: CLINIC | Age: 65
End: 2020-06-10
Attending: INTERNAL MEDICINE
Payer: COMMERCIAL

## 2020-06-10 DIAGNOSIS — Z12.31 VISIT FOR SCREENING MAMMOGRAM: ICD-10-CM

## 2020-07-21 DIAGNOSIS — Z92.21 STATUS POST CHEMOTHERAPY: ICD-10-CM

## 2020-07-21 DIAGNOSIS — C50.512 MALIGNANT NEOPLASM OF LOWER-OUTER QUADRANT OF LEFT BREAST OF FEMALE, ESTROGEN RECEPTOR POSITIVE (H): ICD-10-CM

## 2020-07-21 DIAGNOSIS — Z17.0 MALIGNANT NEOPLASM OF LOWER-OUTER QUADRANT OF LEFT BREAST OF FEMALE, ESTROGEN RECEPTOR POSITIVE (H): ICD-10-CM

## 2020-07-21 LAB
ALBUMIN SERPL-MCNC: 4 G/DL (ref 3.4–5)
ALP SERPL-CCNC: 56 U/L (ref 40–150)
ALT SERPL W P-5'-P-CCNC: 50 U/L (ref 0–50)
ANION GAP SERPL CALCULATED.3IONS-SCNC: 6 MMOL/L (ref 3–14)
AST SERPL W P-5'-P-CCNC: 23 U/L (ref 0–45)
BASOPHILS # BLD AUTO: 0.1 10E9/L (ref 0–0.2)
BASOPHILS NFR BLD AUTO: 1.1 %
BILIRUB SERPL-MCNC: 0.6 MG/DL (ref 0.2–1.3)
BUN SERPL-MCNC: 15 MG/DL (ref 7–30)
CALCIUM SERPL-MCNC: 9.2 MG/DL (ref 8.5–10.1)
CHLORIDE SERPL-SCNC: 107 MMOL/L (ref 94–109)
CO2 SERPL-SCNC: 25 MMOL/L (ref 20–32)
CREAT SERPL-MCNC: 0.71 MG/DL (ref 0.52–1.04)
DIFFERENTIAL METHOD BLD: NORMAL
EOSINOPHIL # BLD AUTO: 0.2 10E9/L (ref 0–0.7)
EOSINOPHIL NFR BLD AUTO: 3.8 %
ERYTHROCYTE [DISTWIDTH] IN BLOOD BY AUTOMATED COUNT: 14.6 % (ref 10–15)
GFR SERPL CREATININE-BSD FRML MDRD: >90 ML/MIN/{1.73_M2}
GLUCOSE SERPL-MCNC: 99 MG/DL (ref 70–99)
HCT VFR BLD AUTO: 39.2 % (ref 35–47)
HGB BLD-MCNC: 13.2 G/DL (ref 11.7–15.7)
LYMPHOCYTES # BLD AUTO: 1.4 10E9/L (ref 0.8–5.3)
LYMPHOCYTES NFR BLD AUTO: 30.5 %
MCH RBC QN AUTO: 31.1 PG (ref 26.5–33)
MCHC RBC AUTO-ENTMCNC: 33.7 G/DL (ref 31.5–36.5)
MCV RBC AUTO: 92 FL (ref 78–100)
MONOCYTES # BLD AUTO: 0.5 10E9/L (ref 0–1.3)
MONOCYTES NFR BLD AUTO: 10.4 %
NEUTROPHILS # BLD AUTO: 2.4 10E9/L (ref 1.6–8.3)
NEUTROPHILS NFR BLD AUTO: 54.2 %
PLATELET # BLD AUTO: 248 10E9/L (ref 150–450)
POTASSIUM SERPL-SCNC: 4.4 MMOL/L (ref 3.4–5.3)
PROT SERPL-MCNC: 7.9 G/DL (ref 6.8–8.8)
RBC # BLD AUTO: 4.25 10E12/L (ref 3.8–5.2)
SODIUM SERPL-SCNC: 138 MMOL/L (ref 133–144)
WBC # BLD AUTO: 4.4 10E9/L (ref 4–11)

## 2020-07-21 PROCEDURE — 80053 COMPREHEN METABOLIC PANEL: CPT | Performed by: INTERNAL MEDICINE

## 2020-07-21 PROCEDURE — 85025 COMPLETE CBC W/AUTO DIFF WBC: CPT | Performed by: INTERNAL MEDICINE

## 2020-07-21 PROCEDURE — 36415 COLL VENOUS BLD VENIPUNCTURE: CPT | Performed by: INTERNAL MEDICINE

## 2020-09-21 ENCOUNTER — INFUSION THERAPY VISIT (OUTPATIENT)
Dept: ONCOLOGY | Facility: CLINIC | Age: 65
End: 2020-09-21
Attending: INTERNAL MEDICINE
Payer: COMMERCIAL

## 2020-09-21 ENCOUNTER — APPOINTMENT (OUTPATIENT)
Dept: LAB | Facility: CLINIC | Age: 65
End: 2020-09-21
Attending: INTERNAL MEDICINE
Payer: COMMERCIAL

## 2020-09-21 VITALS
HEART RATE: 93 BPM | RESPIRATION RATE: 18 BRPM | BODY MASS INDEX: 45.99 KG/M2 | DIASTOLIC BLOOD PRESSURE: 97 MMHG | TEMPERATURE: 98.1 F | WEIGHT: 243 LBS | OXYGEN SATURATION: 98 % | SYSTOLIC BLOOD PRESSURE: 166 MMHG

## 2020-09-21 DIAGNOSIS — Z79.899 ENCOUNTER FOR LONG-TERM (CURRENT) USE OF MEDICATIONS: Primary | ICD-10-CM

## 2020-09-21 DIAGNOSIS — C50.512 MALIGNANT NEOPLASM OF LOWER-OUTER QUADRANT OF LEFT BREAST OF FEMALE, ESTROGEN RECEPTOR POSITIVE (H): ICD-10-CM

## 2020-09-21 DIAGNOSIS — Z17.0 MALIGNANT NEOPLASM OF LOWER-OUTER QUADRANT OF LEFT BREAST OF FEMALE, ESTROGEN RECEPTOR POSITIVE (H): ICD-10-CM

## 2020-09-21 LAB
ALBUMIN SERPL-MCNC: 3.7 G/DL (ref 3.4–5)
CALCIUM SERPL-MCNC: 9.2 MG/DL (ref 8.5–10.1)
CREAT SERPL-MCNC: 0.74 MG/DL (ref 0.52–1.04)
GFR SERPL CREATININE-BSD FRML MDRD: 85 ML/MIN/{1.73_M2}

## 2020-09-21 PROCEDURE — 82310 ASSAY OF CALCIUM: CPT | Performed by: INTERNAL MEDICINE

## 2020-09-21 PROCEDURE — 36415 COLL VENOUS BLD VENIPUNCTURE: CPT

## 2020-09-21 PROCEDURE — 82565 ASSAY OF CREATININE: CPT | Performed by: INTERNAL MEDICINE

## 2020-09-21 PROCEDURE — 96374 THER/PROPH/DIAG INJ IV PUSH: CPT

## 2020-09-21 PROCEDURE — 82040 ASSAY OF SERUM ALBUMIN: CPT | Performed by: INTERNAL MEDICINE

## 2020-09-21 PROCEDURE — 25000128 H RX IP 250 OP 636: Mod: ZF | Performed by: INTERNAL MEDICINE

## 2020-09-21 RX ORDER — ZOLEDRONIC ACID 0.04 MG/ML
4 INJECTION, SOLUTION INTRAVENOUS ONCE
Status: COMPLETED | OUTPATIENT
Start: 2020-09-21 | End: 2020-09-21

## 2020-09-21 RX ADMIN — ZOLEDRONIC ACID 4 MG: 0.04 INJECTION, SOLUTION INTRAVENOUS at 13:07

## 2020-09-21 ASSESSMENT — PAIN SCALES - GENERAL: PAINLEVEL: NO PAIN (0)

## 2020-09-21 NOTE — PROGRESS NOTES
Infusion Nursing Note:  Remedios Osorio presents today for Zometa.    Patient seen by provider today: No    Note: Patient presents to clinic today feeling well with no questions.  Pt did not request or require any intervention for pain today.    Intravenous Access:  Implanted Port.    Treatment Conditions:  Lab Results   Component Value Date     07/21/2020                   Lab Results   Component Value Date    POTASSIUM 4.4 07/21/2020           No results found for: MAG         Lab Results   Component Value Date    CR 0.74 09/21/2020                   Lab Results   Component Value Date    ANAYELI 9.2 09/21/2020                Lab Results   Component Value Date    BILITOTAL 0.6 07/21/2020           Lab Results   Component Value Date    ALBUMIN 3.7 09/21/2020                    Lab Results   Component Value Date    ALT 50 07/21/2020           Lab Results   Component Value Date    AST 23 07/21/2020         Post Infusion Assessment:  Patient tolerated infusion without incident.  Blood return noted pre and post infusion.  Site patent and intact, free from redness, edema or discomfort.  No evidence of extravasations.  Access discontinued per protocol.    Discharge Plan:   Patient declined prescription refills.  Discharge instructions reviewed with: Patient.  Patient and/or family verbalized understanding of discharge instructions and all questions answered.  Copy of AVS reviewed with patient and/or family.  Patient will return 1/25/2020 for next appointment with MD.  Patient discharged in stable condition accompanied by: self.  Departure Mode: Ambulatory.    Bella Swanson RN

## 2020-09-21 NOTE — PATIENT INSTRUCTIONS
Contact Numbers    [facility] Main Line/TR[facility]AGE: [phone]    Call with chills and/or temperature greater than or equal to 100.5, uncontrolled nausea/vomiting, diarrhea, constipation, dizziness, shortness of breath, chest pain, bleeding, unexplained bruising, or any new/concerning symptoms, questions/concerns.     If you are having any concerning symptoms or wish to speak to a provider before your next infusion visit, please call your care coordinator or triage to notify them so we can adequately serve you.       After Hours: [phone]    If after hours, weekends, or holidays, call main hospital  and ask for Oncology doctor on call.           Lab Results:  Recent Results (from the past 12 hour(s))   Creatinine    Collection Time: 09/21/20 12:04 PM   Result Value Ref Range    Creatinine 0.74 0.52 - 1.04 mg/dL    GFR Estimate 85 >60 mL/min/[1.73_m2]    GFR Estimate If Black >90 >60 mL/min/[1.73_m2]   Calcium    Collection Time: 09/21/20 12:04 PM   Result Value Ref Range    Calcium 9.2 8.5 - 10.1 mg/dL   Albumin level    Collection Time: 09/21/20 12:04 PM   Result Value Ref Range    Albumin 3.7 3.4 - 5.0 g/dL

## 2020-09-21 NOTE — NURSING NOTE
Chief Complaint   Patient presents with     Lab Only     veniipuncture, vitals checked, PIV placed     Ammy Newsome RN on 9/21/2020 at 12:05 PM

## 2020-11-22 ENCOUNTER — HEALTH MAINTENANCE LETTER (OUTPATIENT)
Age: 65
End: 2020-11-22

## 2021-01-14 ENCOUNTER — PATIENT OUTREACH (OUTPATIENT)
Dept: ONCOLOGY | Facility: CLINIC | Age: 66
End: 2021-01-14

## 2021-01-15 NOTE — PROGRESS NOTES
RN CARE COORDINATION NOTE      Incoming:  Recevied a vmm from patient stating she is able to get off work on 2/2 at 12:30, she would like her imaging moved to this day and schedule with Leslie at 4:20 as discussed.     Message sent to scheduling pool        Swetha Dahl MSN, RN, OCN  RN Care Coordinator  [facility]  [phone]

## 2021-02-01 NOTE — PROGRESS NOTES
MEDICAL ONCOLOGY FOLLOW-UP PATIENT VISIT    NAME: Remedios Osorio DATE: Feb 2, 2021      PRIMARY CARE PHYSICIAN: Kae Caal    PATIENT ID: Clinical Stage II-B Breast Cancer    Oncology History: Remedios Osorio is a 65 year old female with h/o stage IIIa, O1hX1vO4, ER positive, VT positive, HER2 non-amplified invasive ductal carcinoma of the left breast. Her PCP palpated a breast mass in the lower outer left breast in 01/2016. Diagnostic mammogram and ultrasound showed a mass at 4:30, 8 cm from the nipple, measuring 3.3 cm on mammogram and 2.1 cm on ultrasound. She was also found to have multiple abnormal left axillary lymph nodes. The largest one was 2.4 cm. Core needle biopsy of her left breast mass demonstrated invasive ductal carcinoma, grade 3 with extensive tumor necrosis. Axillary LN biopsy was also positive for malignancy. The tumor cells were strongly positive for estrogen receptor (> 95%) and are moderate to strongly positive for progesterone receptor (60-70%). HER2/lizz was non-amplified by FISH.  PET/CT showed the left breast mass, 5 hypermetabolic left axillary lymph nodes, indeterminate left cervical chain lymph nodes, and an indeterminate hypodensity in the dome of the liver. She received 11 cycles of weekly Taxol, the 12th was cancelled due to neuropathy. She received one cycle of ddAC but did not tolerate this well due to fatigue and generalized weakness. She declined any further chemotherapy.    Left breast lumpectomy and left axillary lymph node dissection was performed by Dr. Amezcua on 6/29/16.  Pathology showed a residual 1.6 cm grade 2, IDC in the left breast.  Surgical margins were negative.  Invasive tumor cellularity of 30%.  Tumor infiltrating lymphocytes were estimated at 50%.  6/19 excised lymph nodes were involved with malignancy.  The largest lymph node metastasis measured 3.9 cm and had a 1 mm area of extranodal extension.  Minimal treatment related changes were noted in the  lymph nodes.  Pathologic staging was [id number], or stage IIIa.  MD José Miguel residual cancer burden was Class III.  Unfortunately she was not eligible for ARIADNA due to having received only 13 weeks of neoadjuvant chemotherapy.  Adjuvant Xeloda was recommended, she declined.  She completed radiation on 10/17/16. She has been on letrozole since early 09/2016.    Interim History: Remedios presents in routine 6 month follow-up. She has continued to be extremely stressed. Her job was bought by a new company and there has been a lot of changes with her job, benefits, and insurance. Her sister continues to live with her. She has not been able to exercise regularly and is making some poor eating choices and has continued to gain some weight. She has had some difficulty sleeping.     Continues to letrozole and tolerating well besides mild hot flashes.     No pain besides she has had some low back pain for the last few days. Fell a few weeks ago on her back and had pain initially after this but this is gone now. No leg weakness, radiating pain or n/t. No change to bowel or bladder function.     No new breast concerns, lumps/bumps, headaches, recent infectious symptoms, cough, or SOB.     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       PAST SURGICAL HISTORY:  Past Surgical History:   Procedure Laterality Date     CL AFF SURGICAL PATHOLOGY  2005    UTERINE LEIOMYOMA  [D25.9]     HC BIOPSY/EXCIS CERVICAL LESION W/WO FULGURATION  08-15-00,12-    endocervical polypectomy     HC TOOTH EXTRACTION W/FORCEP      wisdom teeth     LUMPECTOMY BREAST WITH  "SENTINEL NODE, COMBINED Left 6/29/2016    Segmental mastectomy with axillary lymph node dissection     REMOVE PORT VASCULAR ACCESS Right 6/29/2016    Procedure: REMOVE PORT VASCULAR ACCESS;  Surgeon: Gianna Amezcua MD;  Location:  OR     MEDS:  Current Outpatient Medications   Medication     Calcium-Magnesium-Vitamin D (CALCIUM MAGNESIUM PO)     letrozole (FEMARA) 2.5 MG tablet     melatonin 3 MG CAPS     VITAMIN D PO     order for DME     No current facility-administered medications for this visit.      Facility-Administered Medications Ordered in Other Visits   Medication     lidocaine BUFFERED 1 % solution 10 mL       ALLERGIES:  Allergies   Allergen Reactions     Benadryl [Diphenhydramine] Other (See Comments)     Pt had severe hypotension, nausea and vasovagal type reaction to IV Benadryl.   Give PO only.      Cats      Keflex [Cephalexin]      rash        PHYSICAL EXAM:  BP (!) 164/96   Pulse 87   Temp 98.6  F (37  C) (Tympanic)   Resp 18   Ht 1.592 m (5' 2.68\")   Wt 112.7 kg (248 lb 8 oz)   LMP 10/28/2009   SpO2 98%   BMI 44.47 kg/m    Wt Readings from Last 4 Encounters:   02/02/21 112.7 kg (248 lb 8 oz)   09/21/20 110.2 kg (243 lb)   03/24/20 107.2 kg (236 lb 4.8 oz)   01/21/20 105.7 kg (233 lb)     General: Alert, oriented, pleasant, NAD  HEENT: Normocephalic, atraumatic, no icterus.   Neck: No cervical or supraclavicular LAD.  Axillary: No LAD  Breast: Some mild lymphedema central L breast. No palpable mass or nodularity on either breast.   Lungs: CTA bilaterally, normal work of breathing  Cardiac: RRR, S1, S2, no murmurs  Abdomen: Soft, nontender, nondistended. Normoactive bowel sounds.   MSK: No spinal tenderness. Has some tight paraspinal muscles on lumbar region.   Neuro: CNII-XII grossly intact  Extremities: No pedal edema          LABS REVIEWED THIS VISIT:   2/2/2021 15:52   Sodium 139   Potassium 4.1   Chloride 107   Carbon Dioxide 25   Urea Nitrogen 16   Creatinine 0.70   GFR " Estimate >90   GFR Estimate If Black >90   Calcium 9.3   Anion Gap 7   Albumin 3.7   Protein Total 7.3   Bilirubin Total 0.3   Alkaline Phosphatase 50   ALT 35   AST 12   Glucose 89   WBC 4.9   Hemoglobin 12.1   Hematocrit 36.4   Platelet Count 224   RBC Count 3.96   MCV 92   MCH 30.6   MCHC 33.2   RDW 13.9   Diff Method Automated Method   % Neutrophils 62.4   % Lymphocytes 24.3   % Monocytes 9.5   % Eosinophils 2.8   % Basophils 0.8   % Immature Granulocytes 0.2   Nucleated RBCs 0   Absolute Neutrophil 3.1   Absolute Lymphocytes 1.2   Absolute Monocytes 0.5   Absolute Eosinophils 0.1   Absolute Basophils 0.0   Abs Immature Granulocytes 0.0   Absolute Nucleated RBC 0.0         IMPRESSION/PLAN: Ms. Osorio is a 64 year old female with a h/o stage IIIa, G4mF2O2, ER/DC positive, HER2 negative left breast cancer. She is s/p 11 weeks of weekly taxol, 1 cycle of ddAC, left breast lumpectomy, ALND, and adjuvant radiation.  She has been on letrozole since early September, 2016.      1.  Stage III breast cancer:  Remedios is > 4.5 years out from excision of a left breast cancer.  She continues on letrozole.  She is tolerating the medication well.  We plan to continue letrozole to complete a total of 10 years of endocrine therapy (through 09/2026).      She is asymptomatic of disease recurrence on H&P today. Plan for follow-up with Dr. Patel with mammogram in June.     2.  Bone Health:  DEXA in 10/2018 with a lowest T-score of -0.1.  She started zometa 9/26/16 for prevention of breast cancer bone metastases.She completed final dose 9/2020. DEXA today shows normal bone density on hip, L-spine, and femur but borderline osteopenia in wrist. Wrist was never imaged in the past. Since she completed zometa treatment, would recommend continuing calcium, vitamin D, and increase weight bearing exercise. Have follow-up DEXA in 2 years with wrist study to trend.     3.  Insomnia/stress: Continue melatonin 3 mg PO at bedtime. Discussed good  sleep hygiene and trying sleep meditation before bed. Offered referral to health psychology. She is going to try counseling through her [employer].     4.  Hot flashes:  Unchanged since last visit and mild.     5.  Lymphedema: Stable. She will follow-up with lymphedema for a compression bra.     6.  Weight management: She is going to work on diet and exercise.     7. Low back pain: Ongoing for a few days. No red flag symptoms. Treat as MSK paraspinal strain with ice, stretching, NSAIDS.     55 minutes spent on the date of the encounter doing chart review, review of test results, interpretation of tests, patient visit and documentation       Leslie Pierce PA-C

## 2021-02-02 ENCOUNTER — ANCILLARY PROCEDURE (OUTPATIENT)
Dept: BONE DENSITY | Facility: CLINIC | Age: 66
End: 2021-02-02
Attending: INTERNAL MEDICINE
Payer: COMMERCIAL

## 2021-02-02 ENCOUNTER — ONCOLOGY VISIT (OUTPATIENT)
Dept: ONCOLOGY | Facility: CLINIC | Age: 66
End: 2021-02-02
Attending: INTERNAL MEDICINE
Payer: COMMERCIAL

## 2021-02-02 VITALS
DIASTOLIC BLOOD PRESSURE: 96 MMHG | SYSTOLIC BLOOD PRESSURE: 164 MMHG | WEIGHT: 248.5 LBS | HEIGHT: 63 IN | BODY MASS INDEX: 44.03 KG/M2 | HEART RATE: 87 BPM | TEMPERATURE: 98.6 F | RESPIRATION RATE: 18 BRPM | OXYGEN SATURATION: 98 %

## 2021-02-02 DIAGNOSIS — Z79.811 LONG TERM CURRENT USE OF AROMATASE INHIBITOR: ICD-10-CM

## 2021-02-02 DIAGNOSIS — Z17.0 MALIGNANT NEOPLASM OF LOWER-OUTER QUADRANT OF LEFT BREAST OF FEMALE, ESTROGEN RECEPTOR POSITIVE (H): Primary | ICD-10-CM

## 2021-02-02 DIAGNOSIS — C50.512 MALIGNANT NEOPLASM OF LOWER-OUTER QUADRANT OF LEFT BREAST OF FEMALE, ESTROGEN RECEPTOR POSITIVE (H): ICD-10-CM

## 2021-02-02 DIAGNOSIS — Z12.31 ENCOUNTER FOR SCREENING MAMMOGRAM FOR BREAST CANCER: ICD-10-CM

## 2021-02-02 DIAGNOSIS — C50.512 MALIGNANT NEOPLASM OF LOWER-OUTER QUADRANT OF LEFT BREAST OF FEMALE, ESTROGEN RECEPTOR POSITIVE (H): Primary | ICD-10-CM

## 2021-02-02 DIAGNOSIS — Z17.0 MALIGNANT NEOPLASM OF LOWER-OUTER QUADRANT OF LEFT BREAST OF FEMALE, ESTROGEN RECEPTOR POSITIVE (H): ICD-10-CM

## 2021-02-02 LAB
ALBUMIN SERPL-MCNC: 3.7 G/DL (ref 3.4–5)
ALP SERPL-CCNC: 50 U/L (ref 40–150)
ALT SERPL W P-5'-P-CCNC: 35 U/L (ref 0–50)
ANION GAP SERPL CALCULATED.3IONS-SCNC: 7 MMOL/L (ref 3–14)
AST SERPL W P-5'-P-CCNC: 12 U/L (ref 0–45)
BASOPHILS # BLD AUTO: 0 10E9/L (ref 0–0.2)
BASOPHILS NFR BLD AUTO: 0.8 %
BILIRUB SERPL-MCNC: 0.3 MG/DL (ref 0.2–1.3)
BUN SERPL-MCNC: 16 MG/DL (ref 7–30)
CALCIUM SERPL-MCNC: 9.3 MG/DL (ref 8.5–10.1)
CHLORIDE SERPL-SCNC: 107 MMOL/L (ref 94–109)
CO2 SERPL-SCNC: 25 MMOL/L (ref 20–32)
CREAT SERPL-MCNC: 0.7 MG/DL (ref 0.52–1.04)
DIFFERENTIAL METHOD BLD: NORMAL
EOSINOPHIL # BLD AUTO: 0.1 10E9/L (ref 0–0.7)
EOSINOPHIL NFR BLD AUTO: 2.8 %
ERYTHROCYTE [DISTWIDTH] IN BLOOD BY AUTOMATED COUNT: 13.9 % (ref 10–15)
GFR SERPL CREATININE-BSD FRML MDRD: >90 ML/MIN/{1.73_M2}
GLUCOSE SERPL-MCNC: 89 MG/DL (ref 70–99)
HCT VFR BLD AUTO: 36.4 % (ref 35–47)
HGB BLD-MCNC: 12.1 G/DL (ref 11.7–15.7)
IMM GRANULOCYTES # BLD: 0 10E9/L (ref 0–0.4)
IMM GRANULOCYTES NFR BLD: 0.2 %
LYMPHOCYTES # BLD AUTO: 1.2 10E9/L (ref 0.8–5.3)
LYMPHOCYTES NFR BLD AUTO: 24.3 %
MCH RBC QN AUTO: 30.6 PG (ref 26.5–33)
MCHC RBC AUTO-ENTMCNC: 33.2 G/DL (ref 31.5–36.5)
MCV RBC AUTO: 92 FL (ref 78–100)
MONOCYTES # BLD AUTO: 0.5 10E9/L (ref 0–1.3)
MONOCYTES NFR BLD AUTO: 9.5 %
NEUTROPHILS # BLD AUTO: 3.1 10E9/L (ref 1.6–8.3)
NEUTROPHILS NFR BLD AUTO: 62.4 %
NRBC # BLD AUTO: 0 10*3/UL
NRBC BLD AUTO-RTO: 0 /100
PLATELET # BLD AUTO: 224 10E9/L (ref 150–450)
POTASSIUM SERPL-SCNC: 4.1 MMOL/L (ref 3.4–5.3)
PROT SERPL-MCNC: 7.3 G/DL (ref 6.8–8.8)
RBC # BLD AUTO: 3.96 10E12/L (ref 3.8–5.2)
SODIUM SERPL-SCNC: 139 MMOL/L (ref 133–144)
WBC # BLD AUTO: 4.9 10E9/L (ref 4–11)

## 2021-02-02 PROCEDURE — 36415 COLL VENOUS BLD VENIPUNCTURE: CPT | Performed by: PATHOLOGY

## 2021-02-02 PROCEDURE — G0463 HOSPITAL OUTPT CLINIC VISIT: HCPCS

## 2021-02-02 PROCEDURE — 85025 COMPLETE CBC W/AUTO DIFF WBC: CPT | Performed by: PATHOLOGY

## 2021-02-02 PROCEDURE — 77080 DXA BONE DENSITY AXIAL: CPT | Performed by: INTERNAL MEDICINE

## 2021-02-02 PROCEDURE — 80053 COMPREHEN METABOLIC PANEL: CPT | Performed by: PATHOLOGY

## 2021-02-02 PROCEDURE — 99215 OFFICE O/P EST HI 40 MIN: CPT | Performed by: PHYSICIAN ASSISTANT

## 2021-02-02 ASSESSMENT — MIFFLIN-ST. JEOR: SCORE: 1646.19

## 2021-02-02 ASSESSMENT — PAIN SCALES - GENERAL: PAINLEVEL: MODERATE PAIN (5)

## 2021-02-02 NOTE — LETTER
2/2/2021         RE: Remedios Osorio  [address] [facility] [address]      MEDICAL ONCOLOGY FOLLOW-UP PATIENT VISIT    NAME: Remedios Osorio DATE: Feb 2, 2021      PRIMARY CARE PHYSICIAN: Kae Caal    PATIENT ID: Clinical Stage II-B Breast Cancer    Oncology History: Remedios Osorio is a 65 year old female with h/o stage IIIa, [id number], ER positive, ME positive, HER2 non-amplified invasive ductal carcinoma of the left breast. Her PCP palpated a breast mass in the lower outer left breast in 01/2016. Diagnostic mammogram and ultrasound showed a mass at 4:30, 8 cm from the nipple, measuring 3.3 cm on mammogram and 2.1 cm on ultrasound. She was also found to have multiple abnormal left axillary lymph nodes. The largest one was 2.4 cm. Core needle biopsy of her left breast mass demonstrated invasive ductal carcinoma, grade 3 with extensive tumor necrosis. Axillary LN biopsy was also positive for malignancy. The tumor cells were strongly positive for estrogen receptor (> 95%) and are moderate to strongly positive for progesterone receptor (60-70%). HER2/lizz was non-amplified by FISH.  PET/CT showed the left breast mass, 5 hypermetabolic left axillary lymph nodes, indeterminate left cervical chain lymph nodes, and an indeterminate hypodensity in the dome of the liver. She received 11 cycles of weekly Taxol, the 12th was cancelled due to neuropathy. She received one cycle of ddAC but did not tolerate this well due to fatigue and generalized weakness. She declined any further chemotherapy.    Left breast lumpectomy and left axillary lymph node dissection was performed by Dr. Amezcua on 6/29/16.  Pathology showed a residual 1.6 cm grade 2, IDC in the left breast.  Surgical margins were negative.  Invasive tumor cellularity of 30%.  Tumor infiltrating lymphocytes were estimated at 50%.  6/19 excised lymph nodes were involved with malignancy.  The largest lymph node metastasis measured 3.9 cm and had a 1  mm area of extranodal extension.  Minimal treatment related changes were noted in the lymph nodes.  Pathologic staging was [id number], or stage IIIa.  MD José Miguel residual cancer burden was Class III.  Unfortunately she was not eligible for ARIADNA due to having received only 13 weeks of neoadjuvant chemotherapy.  Adjuvant Xeloda was recommended, she declined.  She completed radiation on 10/17/16. She has been on letrozole since early 09/2016.    Interim History: Remedios presents in routine 6 month follow-up. She has continued to be extremely stressed. Her job was bought by a new company and there has been a lot of changes with her job, benefits, and insurance. Her sister continues to live with her. She has not been able to exercise regularly and is making some poor eating choices and has continued to gain some weight. She has had some difficulty sleeping.     Continues to letrozole and tolerating well besides mild hot flashes.     No pain besides she has had some low back pain for the last few days. Fell a few weeks ago on her back and had pain initially after this but this is gone now. No leg weakness, radiating pain or n/t. No change to bowel or bladder function.     No new breast concerns, lumps/bumps, headaches, recent infectious symptoms, cough, or SOB.     PAST MEDICAL HISTORY:   Past Medical History:   Diagnosis Date     ABNL LIVER [facility] STUDY  W/ MONO  5/01     Breast cancer (H)      CHR BLOOD LOSS ANEMIA DUE TO FIBROIDS 6/9/2005     ELEVATED HBA1C 6.2% 6/05 8/6/2005     HX MUCOUS POLYPS OF CERVIX  2000     HYPERLIPIDEMIA       Infectious mononucleosis 5/01     MENORRHAGIA      MIGRAINE HEADACHES      MORBID OBESITY BMI 44.79 6/05 6/12/2005     UTERINE LEIOMYOMA  2/7/2003       PAST SURGICAL HISTORY:  Past Surgical History:   Procedure Laterality Date     CL AFF SURGICAL PATHOLOGY  2005    UTERINE LEIOMYOMA  [D25.9]     HC BIOPSY/EXCIS CERVICAL LESION W/WO FULGURATION  08-15-00,12-    endocervical  "polypectomy     HC TOOTH EXTRACTION W/FORCEP      wisdom teeth     LUMPECTOMY BREAST WITH SENTINEL NODE, COMBINED Left 6/29/2016    Segmental mastectomy with axillary lymph node dissection     REMOVE PORT VASCULAR ACCESS Right 6/29/2016    Procedure: REMOVE PORT VASCULAR ACCESS;  Surgeon: Gianna Amezcua MD;  Location:  OR     MEDS:  Current Outpatient Medications   Medication     Calcium-Magnesium-Vitamin D (CALCIUM MAGNESIUM PO)     letrozole (FEMARA) 2.5 MG tablet     melatonin 3 MG CAPS     VITAMIN D PO     order for DME     No current facility-administered medications for this visit.      Facility-Administered Medications Ordered in Other Visits   Medication     lidocaine BUFFERED 1 % solution 10 mL       ALLERGIES:  Allergies   Allergen Reactions     Benadryl [Diphenhydramine] Other (See Comments)     Pt had severe hypotension, nausea and vasovagal type reaction to IV Benadryl.   Give PO only.      Cats      Keflex [Cephalexin]      rash        PHYSICAL EXAM:  BP (!) 164/96   Pulse 87   Temp 98.6  F (37  C) (Tympanic)   Resp 18   Ht 1.592 m (5' 2.68\")   Wt 112.7 kg (248 lb 8 oz)   LMP 10/28/2009   SpO2 98%   BMI 44.47 kg/m    Wt Readings from Last 4 Encounters:   02/02/21 112.7 kg (248 lb 8 oz)   09/21/20 110.2 kg (243 lb)   03/24/20 107.2 kg (236 lb 4.8 oz)   01/21/20 105.7 kg (233 lb)     General: Alert, oriented, pleasant, NAD  HEENT: Normocephalic, atraumatic, no icterus.   Neck: No cervical or supraclavicular LAD.  Axillary: No LAD  Breast: Some mild lymphedema central L breast. No palpable mass or nodularity on either breast.   Lungs: CTA bilaterally, normal work of breathing  Cardiac: RRR, S1, S2, no murmurs  Abdomen: Soft, nontender, nondistended. Normoactive bowel sounds.   MSK: No spinal tenderness. Has some tight paraspinal muscles on lumbar region.   Neuro: CNII-XII grossly intact  Extremities: No pedal edema          LABS REVIEWED THIS VISIT:   2/2/2021 15:52   Sodium 139 "   Potassium 4.1   Chloride 107   Carbon Dioxide 25   Urea Nitrogen 16   Creatinine 0.70   GFR Estimate >90   GFR Estimate If Black >90   Calcium 9.3   Anion Gap 7   Albumin 3.7   Protein Total 7.3   Bilirubin Total 0.3   Alkaline Phosphatase 50   ALT 35   AST 12   Glucose 89   WBC 4.9   Hemoglobin 12.1   Hematocrit 36.4   Platelet Count 224   RBC Count 3.96   MCV 92   MCH 30.6   MCHC 33.2   RDW 13.9   Diff Method Automated Method   % Neutrophils 62.4   % Lymphocytes 24.3   % Monocytes 9.5   % Eosinophils 2.8   % Basophils 0.8   % Immature Granulocytes 0.2   Nucleated RBCs 0   Absolute Neutrophil 3.1   Absolute Lymphocytes 1.2   Absolute Monocytes 0.5   Absolute Eosinophils 0.1   Absolute Basophils 0.0   Abs Immature Granulocytes 0.0   Absolute Nucleated RBC 0.0         IMPRESSION/PLAN: Ms. Osorio is a 64 year old female with a h/o stage IIIa, [id number], ER/CT positive, HER2 negative left breast cancer. She is s/p 11 weeks of weekly taxol, 1 cycle of ddAC, left breast lumpectomy, ALND, and adjuvant radiation.  She has been on letrozole since early September, 2016.      1.  Stage III breast cancer:  Remedios is > 4.5 years out from excision of a left breast cancer.  She continues on letrozole.  She is tolerating the medication well.  We plan to continue letrozole to complete a total of 10 years of endocrine therapy (through 09/2026).      She is asymptomatic of disease recurrence on H&P today. Plan for follow-up with Dr. Patel with mammogram in June.     2.  Bone Health:  DEXA in 10/2018 with a lowest T-score of -0.1.  She started zometa 9/26/16 for prevention of breast cancer bone metastases.She completed final dose 9/2020. DEXA today shows normal bone density on hip, L-spine, and femur but borderline osteopenia in wrist. Wrist was never imaged in the past. Since she completed zometa treatment, would recommend continuing calcium, vitamin D, and increase weight bearing exercise. Have follow-up DEXA in 2 years with wrist  study to trend.     3.  Insomnia/stress: Continue melatonin 3 mg PO at bedtime. Discussed good sleep hygiene and trying sleep meditation before bed. Offered referral to health psychology. She is going to try counseling through her [facility].     4.  Hot flashes:  Unchanged since last visit and mild.     5.  Lymphedema: Stable. She will follow-up with lymphedema for a compression bra.     6.  Weight management: She is going to work on diet and exercise.     7. Low back pain: Ongoing for a few days. No red flag symptoms. Treat as MSK paraspinal strain with ice, stretching, NSAIDS.     55 minutes spent on the date of the encounter doing chart review, review of test results, interpretation of tests, patient visit and documentation       MITESH Barcenas PA-C

## 2021-03-03 DIAGNOSIS — C50.512 MALIGNANT NEOPLASM OF LOWER-OUTER QUADRANT OF LEFT BREAST OF FEMALE, ESTROGEN RECEPTOR POSITIVE (H): ICD-10-CM

## 2021-03-03 DIAGNOSIS — Z17.0 MALIGNANT NEOPLASM OF LOWER-OUTER QUADRANT OF LEFT BREAST OF FEMALE, ESTROGEN RECEPTOR POSITIVE (H): ICD-10-CM

## 2021-03-03 RX ORDER — LETROZOLE 2.5 MG/1
2.5 TABLET, FILM COATED ORAL DAILY
Qty: 90 TABLET | Refills: 3 | Status: SHIPPED | OUTPATIENT
Start: 2021-03-03 | End: 2022-02-21

## 2021-08-15 NOTE — PROGRESS NOTES
MEDICAL ONCOLOGY FOLLOW-UP PATIENT VISIT (Video Visit)    NAME: Remedios Osorio     DATE: 8/16/2020    PRIMARY CARE PHYSICIAN: Kae Caal    PATIENT ID: Clinical Stage II-B Breast Cancer    Oncology History: Remedios Osorio is a 65 year old female with h/o stage IIIa, [id number], ER positive, MS positive, HER2 non-amplified invasive ductal carcinoma of the left breast. Her PCP palpated a mass in the lower outer left breast in 01/2016. Diagnostic mammogram and ultrasound showed a 3.3 cm mass at 4:30, 8 cm from the nipple. She was also found to have multiple abnormal left axillary lymph nodes. The largest one was 2.4 cm. Biopsy of her left breast mass demonstrated invasive ductal carcinoma, grade 3 with extensive tumor necrosis. Axillary LN biopsy was also positive for malignancy. The tumor cells were strongly positive for estrogen receptor (> 95%) and moderate to strongly positive for progesterone receptor (60-70%). HER2/lizz was non-amplified by FISH.  PET/CT showed the left breast mass, 5 hypermetabolic left axillary lymph nodes, indeterminate left cervical chain lymph nodes, and an indeterminate hypodensity in the dome of the liver. She received 11 cycles of weekly Taxol, the 12th was cancelled due to neuropathy. She received one cycle of ddAC but did not tolerate it due to fatigue and generalized weakness. She declined further chemotherapy.    Left breast lumpectomy and left axillary lymph node dissection was performed by Dr. Amezcua on 6/29/16.  Pathology showed a residual 1.6 cm grade 2, IDC in the left breast.  Surgical margins were negative.  Invasive tumor cellularity of 30%.  Tumor infiltrating lymphocytes were estimated at 50%.  6/19 excised lymph nodes were involved with malignancy.  The largest lymph node metastasis measured 3.9 cm and had a 1 mm area of extranodal extension.  Minimal treatment related changes were noted in the lymph nodes.  Pathologic staging was [id number], or stage IIIa.  MD Valdez  residual cancer burden was Class III.  Adjuvant Xeloda was recommended, she declined.  She completed radiation on 10/17/16.  She declined zometa for prevention of bone metastases.  She has been on letrozole since early 09/2016.    Interim History:   Ms. Osorio was seen in person today for routine breast cancer follow-up.  She continues on treatment of letrozole and is overall tolerating the medication well.  She is generally in okay health.  She has had an incredible amount of stress since our last visit.  She states [facility] was bought by [employer] and it has been a really difficult company to work for.  They have laid off a number of people including multiple of her friends.  She spent a long time worrying about the security of her own job.  Ultimately she has been able to keep her job, however the work environment is not good.  She is questioning whether she should continue to work there and is already started looking for other positions.  In the midst of this she has gained a significant amount of weight.  She is frustrated by this.  She has been walking the corridor at work for 15 minutes twice a day.  She admits that she is eating carbohydrates in her diet and knows that she needs to reduce carbohydrate intake.      She denies concerning lumps in either breast.  She reports tenderness of the left lateral chest wall in the inframammary fold especially when she bends over.  She has had increased lymphedema of the left breast, the left lateral chest wall, and the left upper extremity.  She has no current cough, shortness of breath, or chest pain.  She has had increased frequency of migraine headaches.  She denies focal neurologic complaints.  The remainder of a complete 12 point review of systems was reviewed with patient was negative with exception that mentioned above.    PAST MEDICAL HISTORY:   Past Medical History:   Diagnosis Date     ABN LIVER [facility] STUDY  W/ MONO  5/01     Breast cancer  "(H)      CHR BLOOD LOSS ANEMIA DUE TO FIBROIDS 6/9/2005     ELEVATED HBA1C 6.2% 6/05 8/6/2005     HX MUCOUS POLYPS OF CERVIX  2000     HYPERLIPIDEMIA       Infectious mononucleosis 5/01     MENORRHAGIA      MIGRAINE HEADACHES      MORBID OBESITY BMI 44.79 6/05 6/12/2005     UTERINE LEIOMYOMA  2/7/2003       PAST SURGICAL HISTORY:  Past Surgical History:   Procedure Laterality Date     CL AFF SURGICAL PATHOLOGY  2005    UTERINE LEIOMYOMA  [D25.9]     HC BIOPSY/EXCIS CERVICAL LESION W/WO FULGURATION  08-15-00,12-    endocervical polypectomy     HC TOOTH EXTRACTION W/FORCEP      wisdom teeth     LUMPECTOMY BREAST WITH SENTINEL NODE, COMBINED Left 6/29/2016    Segmental mastectomy with axillary lymph node dissection     REMOVE PORT VASCULAR ACCESS Right 6/29/2016    Procedure: REMOVE PORT VASCULAR ACCESS;  Surgeon: Gianna Amezcua MD;  Location:  OR     MEDS:  Current Outpatient Medications   Medication     Calcium-Magnesium-Vitamin D (CALCIUM MAGNESIUM PO)     letrozole (FEMARA) 2.5 MG tablet     melatonin 3 MG CAPS     order for DME     VITAMIN D PO     No current facility-administered medications for this visit.     Facility-Administered Medications Ordered in Other Visits   Medication     lidocaine BUFFERED 1 % solution 10 mL       ALLERGIES:  Allergies   Allergen Reactions     Benadryl [Diphenhydramine] Other (See Comments)     Pt had severe hypotension, nausea and vasovagal type reaction to IV Benadryl.   Give PO only.      Cats      Keflex [Cephalexin]      rash        PHYSICAL EXAM:  BP (!) 162/68   Pulse 92   Temp 98.5  F (36.9  C)   Resp 16   Ht 1.592 m (5' 2.68\")   Wt 113.9 kg (251 lb)   LMP 10/28/2009   SpO2 97%   BMI 44.92 kg/m    General:  Well appearing adult female in NAD.  Alert and oriented.  HEENT:  Normocephalic.  Sclera anicteric.  MMM.  No lesions of the oropharynx.  Lymph:  No palpable cervical, supraclavicular, or axillary LAD.  Chest:  CTA bilaterally.  No wheezes or " crackles.  CV:  RRR.  Nl S1 and S2.  No m/r/g.  Breast:  Bilateral breasts are of normal fibroglandular density.  There are no discretely palpable masses in either breast.  Abd:  Soft/ND.    Ext:  No pitting edema of the bilateral lower extremities.    Musculo:  Full ROM of the bilateral upper extremities.  Neuro:  Cranial nerves grossly intact.  Psych:  Mood and affect appear normal.  Skin:  No visible concerning skin rashes or lesions.    LABS REVIEWED THIS VISIT:  8/16/2021 Bilateral screening mammograms:  On my view lumpectomy changes of the left breast.  No significant change in either breast since last exam.    2/2/2021 DEXA bone density scan:  Lowest T-score = -1.2 at the distal radius, 0.7 in the L-spine, and 0.0 in the left femoral neck    IMPRESSION/PLAN: Ms. Osorio is a 65 year old female with a h/o stage IIIa, [id number], ER/[place] positive, HER2 negative left breast cancer. She is s/p 11 weeks of weekly taxol, 1 cycle of ddAC, left breast lumpectomy, ALND, and adjuvant radiation.  She has been on letrozole since early September, 2016.      1.  Stage III breast cancer:  Remedios is > 5 years out from excision of a left breast cancer.  She continues on letrozole.  According to the EBCTCG meta-analysis of 20 year risk of breast cancer recurrence after 5 years of endocrine therapy, her risk of distant recurrence in years 5-20 is approximately 20%.  We plan to continue letrozole to complete a total of 10 years of endocrine therapy (through 09/2026).      She is asymptomatic of disease recurrence on exam today.  Images from bilateral screening mammograms were reviewed and are without significant change from prior.  I will see her back in 6 months for her next visit.      2.  Bone Health:  DEXA in 02/2021 with a lowest T-score of -1.2 in the distal radius c/w osteopenia.  She has been on zometa since 9/26/19 for prevention of breast cancer bone metastases.  She was due for a dose in March, however, has not yet received  it.  She thought she had already completed two years.  We reviewed the dates she received zometa and that she has only received 3 doses thus far.  Therefore recommend proceeding with the 4th and final dose.  We will help her to schedule zometa infusion at this time.  This will be her final planned dose, as she will have completed a total of 2 years.      3.  Stress:  We discussed mechanisms of coping with stress such as exercise, relying on a support system and therapy.      4.  Lymphedema:  Increased lymphedema of left breast and left lateral chest wall since last visit.  Lymphedema referral placed today.    5.  Weight management:  She has gained a significant amount of weight since last visit.  We spent some time discussing this today.  She is working to reduce stress related to work.  She has added back walking to her daily routine and plans to again focus on decreasing carbohydrate intake in the diet.      6.  Hypothyroidism:  She has again resumed iodine intake.  I cautioned her against the use of iodine.  It has been some time since last TSH was checked, thyroid function tests ordered; she will have them drawn at her local lab.    7.  Left chest wall pain:  Intermittent and positional, therefore unlikely to be due to malignancy.  Suspect fibrosis from radiation and surgery changes.  Recommend massage and ibu profen prn.    8.  Follow Up:  Labs and zometa infusion within 1 month.  Return to clinic in approximately 6 months for labs and visit with me.    35 minutes spent on the date of the encounter doing chart review, review of test results, interpretation of tests, patient visit and documentation

## 2021-08-16 ENCOUNTER — ANCILLARY PROCEDURE (OUTPATIENT)
Dept: MAMMOGRAPHY | Facility: CLINIC | Age: 66
End: 2021-08-16
Payer: COMMERCIAL

## 2021-08-16 ENCOUNTER — ONCOLOGY VISIT (OUTPATIENT)
Dept: ONCOLOGY | Facility: CLINIC | Age: 66
End: 2021-08-16
Attending: INTERNAL MEDICINE
Payer: COMMERCIAL

## 2021-08-16 VITALS
HEIGHT: 63 IN | BODY MASS INDEX: 44.47 KG/M2 | HEART RATE: 92 BPM | SYSTOLIC BLOOD PRESSURE: 162 MMHG | TEMPERATURE: 98.5 F | OXYGEN SATURATION: 97 % | RESPIRATION RATE: 16 BRPM | WEIGHT: 251 LBS | DIASTOLIC BLOOD PRESSURE: 68 MMHG

## 2021-08-16 DIAGNOSIS — Z92.21 STATUS POST CHEMOTHERAPY: ICD-10-CM

## 2021-08-16 DIAGNOSIS — E03.8 OTHER SPECIFIED HYPOTHYROIDISM: ICD-10-CM

## 2021-08-16 DIAGNOSIS — C50.512 MALIGNANT NEOPLASM OF LOWER-OUTER QUADRANT OF LEFT BREAST OF FEMALE, ESTROGEN RECEPTOR POSITIVE (H): ICD-10-CM

## 2021-08-16 DIAGNOSIS — Z12.31 ENCOUNTER FOR SCREENING MAMMOGRAM FOR BREAST CANCER: ICD-10-CM

## 2021-08-16 DIAGNOSIS — Z17.0 MALIGNANT NEOPLASM OF LOWER-OUTER QUADRANT OF LEFT BREAST OF FEMALE, ESTROGEN RECEPTOR POSITIVE (H): Primary | ICD-10-CM

## 2021-08-16 DIAGNOSIS — C50.512 MALIGNANT NEOPLASM OF LOWER-OUTER QUADRANT OF LEFT BREAST OF FEMALE, ESTROGEN RECEPTOR POSITIVE (H): Primary | ICD-10-CM

## 2021-08-16 DIAGNOSIS — I89.0 LYMPHEDEMA OF LEFT UPPER EXTREMITY: ICD-10-CM

## 2021-08-16 DIAGNOSIS — Z17.0 MALIGNANT NEOPLASM OF LOWER-OUTER QUADRANT OF LEFT BREAST OF FEMALE, ESTROGEN RECEPTOR POSITIVE (H): ICD-10-CM

## 2021-08-16 PROCEDURE — 77063 BREAST TOMOSYNTHESIS BI: CPT | Mod: GC | Performed by: RADIOLOGY

## 2021-08-16 PROCEDURE — G0463 HOSPITAL OUTPT CLINIC VISIT: HCPCS

## 2021-08-16 PROCEDURE — 99214 OFFICE O/P EST MOD 30 MIN: CPT | Performed by: INTERNAL MEDICINE

## 2021-08-16 PROCEDURE — 77067 SCR MAMMO BI INCL CAD: CPT | Mod: GC | Performed by: RADIOLOGY

## 2021-08-16 ASSESSMENT — MIFFLIN-ST. JEOR: SCORE: 1657.53

## 2021-08-16 ASSESSMENT — PAIN SCALES - GENERAL: PAINLEVEL: NO PAIN (0)

## 2021-08-16 NOTE — LETTER
8/16/2021         RE: Remedios Osorio  [address]  [place] [address]        Dear Colleague,    Thank you for referring your patient, Remedios Osorio, to the [facility]. Please see a copy of my visit note below.    MEDICAL ONCOLOGY FOLLOW-UP PATIENT VISIT (Video Visit)    NAME: Remedios Osorio     DATE: 8/16/2020    PRIMARY CARE PHYSICIAN: Kae Caal    PATIENT ID: Clinical Stage II-B Breast Cancer    Oncology History: Remedios Osorio is a 65 year old female with h/o stage IIIa, [id number], ER positive, HI positive, HER2 non-amplified invasive ductal carcinoma of the left breast. Her PCP palpated a mass in the lower outer left breast in 01/2016. Diagnostic mammogram and ultrasound showed a 3.3 cm mass at 4:30, 8 cm from the nipple. She was also found to have multiple abnormal left axillary lymph nodes. The largest one was 2.4 cm. Biopsy of her left breast mass demonstrated invasive ductal carcinoma, grade 3 with extensive tumor necrosis. Axillary LN biopsy was also positive for malignancy. The tumor cells were strongly positive for estrogen receptor (> 95%) and moderate to strongly positive for progesterone receptor (60-70%). HER2/lizz was non-amplified by FISH.  PET/CT showed the left breast mass, 5 hypermetabolic left axillary lymph nodes, indeterminate left cervical chain lymph nodes, and an indeterminate hypodensity in the dome of the liver. She received 11 cycles of weekly Taxol, the 12th was cancelled due to neuropathy. She received one cycle of ddAC but did not tolerate it due to fatigue and generalized weakness. She declined further chemotherapy.    Left breast lumpectomy and left axillary lymph node dissection was performed by Dr. Amezcua on 6/29/16.  Pathology showed a residual 1.6 cm grade 2, IDC in the left breast.  Surgical margins were negative.  Invasive tumor cellularity of 30%.  Tumor infiltrating lymphocytes were estimated at 50%.  6/19 excised lymph  nodes were involved with malignancy.  The largest lymph node metastasis measured 3.9 cm and had a 1 mm area of extranodal extension.  Minimal treatment related changes were noted in the lymph nodes.  Pathologic staging was [id number], or stage IIIa.  [facility] residual cancer burden was Class III.  Adjuvant Xeloda was recommended, she declined.  She completed radiation on 10/17/16.  She declined zometa for prevention of bone metastases.  She has been on letrozole since early 09/2016.    Interim History:   Ms. Osorio was seen in person today for routine breast cancer follow-up.  She continues on treatment of letrozole and is overall tolerating the medication well.  She is generally in okay health.  She has had an incredible amount of stress since our last visit.  She states [facility] was bought by [employer] and it has been a really difficult company to work for.  They have laid off a number of people including multiple of her friends.  She spent a long time worrying about the security of her own job.  Ultimately she has been able to keep her job, however the work environment is not good.  She is questioning whether she should continue to work there and is already started looking for other positions.  In the midst of this she has gained a significant amount of weight.  She is frustrated by this.  She has been walking the corridor at work for 15 minutes twice a day.  She admits that she is eating carbohydrates in her diet and knows that she needs to reduce carbohydrate intake.      She denies concerning lumps in either breast.  She reports tenderness of the left lateral chest wall in the inframammary fold especially when she bends over.  She has had increased lymphedema of the left breast, the left lateral chest wall, and the left upper extremity.  She has no current cough, shortness of breath, or chest pain.  She has had increased frequency of migraine headaches.  She denies focal neurologic  "complaints.  The remainder of a complete 12 point review of systems was reviewed with patient was negative with exception that mentioned above.    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       PAST SURGICAL HISTORY:  Past Surgical History:   Procedure Laterality Date     CL AFF SURGICAL PATHOLOGY  2005    UTERINE LEIOMYOMA  [D25.9]     HC BIOPSY/EXCIS CERVICAL LESION W/WO FULGURATION  08-15-00,12-    endocervical polypectomy     HC TOOTH EXTRACTION W/FORCEP      wisdom teeth     LUMPECTOMY BREAST WITH SENTINEL NODE, COMBINED Left 6/29/2016    Segmental mastectomy with axillary lymph node dissection     REMOVE PORT VASCULAR ACCESS Right 6/29/2016    Procedure: REMOVE PORT VASCULAR ACCESS;  Surgeon: Gianna Amezcua MD;  Location:  OR     MEDS:  Current Outpatient Medications   Medication     Calcium-Magnesium-Vitamin D (CALCIUM MAGNESIUM PO)     letrozole (FEMARA) 2.5 MG tablet     melatonin 3 MG CAPS     order for DME     VITAMIN D PO     No current facility-administered medications for this visit.     Facility-Administered Medications Ordered in Other Visits   Medication     lidocaine BUFFERED 1 % solution 10 mL       ALLERGIES:  Allergies   Allergen Reactions     Benadryl [Diphenhydramine] Other (See Comments)     Pt had severe hypotension, nausea and vasovagal type reaction to IV Benadryl.   Give PO only.      Cats      Keflex [Cephalexin]      rash        PHYSICAL EXAM:  BP (!) 162/68   Pulse 92   Temp 98.5  F (36.9  C)   Resp 16   Ht 1.592 m (5' 2.68\")   Wt 113.9 kg (251 lb)   LMP 10/28/2009   SpO2 97%   BMI " 44.92 kg/m    General:  Well appearing adult female in NAD.  Alert and oriented.  HEENT:  Normocephalic.  Sclera anicteric.  MMM.  No lesions of the oropharynx.  Lymph:  No palpable cervical, supraclavicular, or axillary LAD.  Chest:  CTA bilaterally.  No wheezes or crackles.  CV:  RRR.  Nl S1 and S2.  No m/r/g.  Breast:  Bilateral breasts are of normal fibroglandular density.  There are no discretely palpable masses in either breast.  Abd:  Soft/ND.    Ext:  No pitting edema of the bilateral lower extremities.    Musculo:  Full ROM of the bilateral upper extremities.  Neuro:  Cranial nerves grossly intact.  Psych:  Mood and affect appear normal.  Skin:  No visible concerning skin rashes or lesions.    LABS REVIEWED THIS VISIT:  8/16/2021 Bilateral screening mammograms:  On my view lumpectomy changes of the left breast.  No significant change in either breast since last exam.    2/2/2021 DEXA bone density scan:  Lowest T-score = -1.2 at the distal radius, 0.7 in the L-spine, and 0.0 in the left femoral neck    IMPRESSION/PLAN: Ms. Osorio is a 65 year old female with a h/o stage IIIa, W1qR2Y0, ER/OR positive, HER2 negative left breast cancer. She is s/p 11 weeks of weekly taxol, 1 cycle of ddAC, left breast lumpectomy, ALND, and adjuvant radiation.  She has been on letrozole since early September, 2016.      1.  Stage III breast cancer:  Remedios is > 5 years out from excision of a left breast cancer.  She continues on letrozole.  According to the EBCTCG meta-analysis of 20 year risk of breast cancer recurrence after 5 years of endocrine therapy, her risk of distant recurrence in years 5-20 is approximately 20%.  We plan to continue letrozole to complete a total of 10 years of endocrine therapy (through 09/2026).      She is asymptomatic of disease recurrence on exam today.  Images from bilateral screening mammograms were reviewed and are without significant change from prior.  I will see her back in 6 months for her  next visit.      2.  Bone Health:  DEXA in 02/2021 with a lowest T-score of -1.2 in the distal radius c/w osteopenia.  She has been on zometa since 9/26/19 for prevention of breast cancer bone metastases.  She was due for a dose in March, however, has not yet received it.  She thought she had already completed two years.  We reviewed the dates she received zometa and that she has only received 3 doses thus far.  Therefore recommend proceeding with the 4th and final dose.  We will help her to schedule zometa infusion at this time.  This will be her final planned dose, as she will have completed a total of 2 years.      3.  Stress:  We discussed mechanisms of coping with stress such as exercise, relying on a support system and therapy.      4.  Lymphedema:  Increased lymphedema of left breast and left lateral chest wall since last visit.  Lymphedema referral placed today.    5.  Weight management:  She has gained a significant amount of weight since last visit.  We spent some time discussing this today.  She is working to reduce stress related to work.  She has added back walking to her daily routine and plans to again focus on decreasing carbohydrate intake in the diet.      6.  Hypothyroidism:  She has again resumed iodine intake.  I cautioned her against the use of iodine.  It has been some time since last TSH was checked, thyroid function tests ordered; she will have them drawn at her local lab.    7.  Left chest wall pain:  Intermittent and positional, therefore unlikely to be due to malignancy.  Suspect fibrosis from radiation and surgery changes.  Recommend massage and ibu profen prn.    8.  Follow Up:  Labs and zometa infusion within 1 month.  Return to clinic in approximately 6 months for labs and visit with me.    35 minutes spent on the date of the encounter doing chart review, review of test results, interpretation of tests, patient visit and documentation         Again, thank you for allowing me to  participate in the care of your patient.        Sincerely,        Kanchan Patel MD

## 2021-08-16 NOTE — NURSING NOTE
"Oncology Rooming Note    August 16, 2021 4:16 PM   Remedios Osorio is a 65 year old female who presents for:    Chief Complaint   Patient presents with     Oncology Clinic Visit     Malignant neoplasm of lower-outer quadrant of left female breast      Initial Vitals: BP (!) 162/68   Pulse 92   Temp 98.5  F (36.9  C)   Resp 16   Ht 1.592 m (5' 2.68\")   Wt 113.9 kg (251 lb)   LMP 10/28/2009   SpO2 97%   BMI 44.92 kg/m   Estimated body mass index is 44.92 kg/m  as calculated from the following:    Height as of this encounter: 1.592 m (5' 2.68\").    Weight as of this encounter: 113.9 kg (251 lb). Body surface area is 2.24 meters squared.  No Pain (0) Comment: Data Unavailable   Patient's last menstrual period was 10/28/2009.  Allergies reviewed: Yes  Medications reviewed: Yes    Medications: Medication refills not needed today.  Pharmacy name entered into [employer]:    [facility] [place] - [place], [place] - [address].  [id number] [place] [address] [id number] [place] [address] [facility] [place] [facility] [place] [address] [facility] [place] - [address] [facility] [place], [place] - [address]  [facility] [place], [place] - [address] [facility] [place], [place] - [address]    Clinical concerns: Swollen hand left side        Dagoberto Burnett MA            "

## 2021-08-24 ENCOUNTER — HOSPITAL ENCOUNTER (OUTPATIENT)
Dept: PHYSICAL THERAPY | Facility: CLINIC | Age: 66
Setting detail: THERAPIES SERIES
End: 2021-08-24
Attending: INTERNAL MEDICINE
Payer: COMMERCIAL

## 2021-08-24 DIAGNOSIS — I89.0 LYMPHEDEMA OF LEFT UPPER EXTREMITY: ICD-10-CM

## 2021-08-24 PROCEDURE — 97110 THERAPEUTIC EXERCISES: CPT | Mod: GP

## 2021-08-24 PROCEDURE — 97161 PT EVAL LOW COMPLEX 20 MIN: CPT | Mod: GP

## 2021-08-24 PROCEDURE — 97140 MANUAL THERAPY 1/> REGIONS: CPT | Mod: GP

## 2021-09-03 ENCOUNTER — HOSPITAL ENCOUNTER (OUTPATIENT)
Dept: PHYSICAL THERAPY | Facility: CLINIC | Age: 66
Setting detail: THERAPIES SERIES
End: 2021-09-03
Attending: INTERNAL MEDICINE
Payer: COMMERCIAL

## 2021-09-03 DIAGNOSIS — I89.0 LYMPHEDEMA OF ARM: Primary | ICD-10-CM

## 2021-09-03 DIAGNOSIS — I89.0 LYMPHEDEMA OF BREAST: ICD-10-CM

## 2021-09-03 DIAGNOSIS — C50.512 MALIGNANT NEOPLASM OF LOWER-OUTER QUADRANT OF LEFT FEMALE BREAST, UNSPECIFIED ESTROGEN RECEPTOR STATUS (H): ICD-10-CM

## 2021-09-03 PROCEDURE — 97140 MANUAL THERAPY 1/> REGIONS: CPT | Mod: GP

## 2021-09-10 ENCOUNTER — HOSPITAL ENCOUNTER (OUTPATIENT)
Dept: PHYSICAL THERAPY | Facility: CLINIC | Age: 66
Setting detail: THERAPIES SERIES
End: 2021-09-10
Attending: INTERNAL MEDICINE
Payer: COMMERCIAL

## 2021-09-10 PROCEDURE — 97140 MANUAL THERAPY 1/> REGIONS: CPT | Mod: GP

## 2021-09-10 PROCEDURE — 97110 THERAPEUTIC EXERCISES: CPT | Mod: GP

## 2021-09-10 NOTE — DISCHARGE INSTRUCTIONS
1. Make appointment for garment fitting (bra and new compression sleeve)  2. Wear swell spot at least 3x day a week (5x is better)  3. Self massage and exercises 1x a day  4. Look into a hand held massager  5. Keep up walking daily

## 2021-09-18 ENCOUNTER — HEALTH MAINTENANCE LETTER (OUTPATIENT)
Age: 66
End: 2021-09-18

## 2021-09-28 ENCOUNTER — HOSPITAL ENCOUNTER (OUTPATIENT)
Dept: PHYSICAL THERAPY | Facility: CLINIC | Age: 66
Setting detail: THERAPIES SERIES
End: 2021-09-28
Payer: COMMERCIAL

## 2021-09-28 PROCEDURE — 97140 MANUAL THERAPY 1/> REGIONS: CPT | Mod: GP | Performed by: PHYSICAL THERAPIST

## 2021-10-18 ENCOUNTER — INFUSION THERAPY VISIT (OUTPATIENT)
Dept: ONCOLOGY | Facility: CLINIC | Age: 66
End: 2021-10-18
Attending: INTERNAL MEDICINE
Payer: COMMERCIAL

## 2021-10-18 ENCOUNTER — APPOINTMENT (OUTPATIENT)
Dept: LAB | Facility: CLINIC | Age: 66
End: 2021-10-18
Attending: INTERNAL MEDICINE
Payer: COMMERCIAL

## 2021-10-18 VITALS
DIASTOLIC BLOOD PRESSURE: 91 MMHG | OXYGEN SATURATION: 96 % | RESPIRATION RATE: 18 BRPM | SYSTOLIC BLOOD PRESSURE: 146 MMHG | TEMPERATURE: 98.8 F | BODY MASS INDEX: 44.65 KG/M2 | WEIGHT: 249.5 LBS | HEART RATE: 79 BPM

## 2021-10-18 DIAGNOSIS — E03.8 OTHER SPECIFIED HYPOTHYROIDISM: ICD-10-CM

## 2021-10-18 DIAGNOSIS — Z17.0 MALIGNANT NEOPLASM OF LOWER-OUTER QUADRANT OF LEFT BREAST OF FEMALE, ESTROGEN RECEPTOR POSITIVE (H): ICD-10-CM

## 2021-10-18 DIAGNOSIS — C50.512 MALIGNANT NEOPLASM OF LOWER-OUTER QUADRANT OF LEFT BREAST OF FEMALE, ESTROGEN RECEPTOR POSITIVE (H): ICD-10-CM

## 2021-10-18 DIAGNOSIS — Z92.21 STATUS POST CHEMOTHERAPY: ICD-10-CM

## 2021-10-18 DIAGNOSIS — Z79.899 ENCOUNTER FOR LONG-TERM (CURRENT) USE OF MEDICATIONS: Primary | ICD-10-CM

## 2021-10-18 LAB
ALBUMIN SERPL-MCNC: 3.7 G/DL (ref 3.4–5)
BASOPHILS # BLD AUTO: 0.1 10E3/UL (ref 0–0.2)
BASOPHILS NFR BLD AUTO: 1 %
CALCIUM SERPL-MCNC: 9.3 MG/DL (ref 8.5–10.1)
CREAT SERPL-MCNC: 0.8 MG/DL (ref 0.52–1.04)
EOSINOPHIL # BLD AUTO: 0.1 10E3/UL (ref 0–0.7)
EOSINOPHIL NFR BLD AUTO: 2 %
ERYTHROCYTE [DISTWIDTH] IN BLOOD BY AUTOMATED COUNT: 14.7 % (ref 10–15)
GFR SERPL CREATININE-BSD FRML MDRD: 78 ML/MIN/1.73M2
HCT VFR BLD AUTO: 38.2 % (ref 35–47)
HGB BLD-MCNC: 12.5 G/DL (ref 11.7–15.7)
IMM GRANULOCYTES # BLD: 0 10E3/UL
IMM GRANULOCYTES NFR BLD: 0 %
LYMPHOCYTES # BLD AUTO: 1.1 10E3/UL (ref 0.8–5.3)
LYMPHOCYTES NFR BLD AUTO: 23 %
MCH RBC QN AUTO: 30.2 PG (ref 26.5–33)
MCHC RBC AUTO-ENTMCNC: 32.7 G/DL (ref 31.5–36.5)
MCV RBC AUTO: 92 FL (ref 78–100)
MONOCYTES # BLD AUTO: 0.4 10E3/UL (ref 0–1.3)
MONOCYTES NFR BLD AUTO: 9 %
NEUTROPHILS # BLD AUTO: 3.2 10E3/UL (ref 1.6–8.3)
NEUTROPHILS NFR BLD AUTO: 65 %
NRBC # BLD AUTO: 0 10E3/UL
NRBC BLD AUTO-RTO: 0 /100
PLATELET # BLD AUTO: 230 10E3/UL (ref 150–450)
RBC # BLD AUTO: 4.14 10E6/UL (ref 3.8–5.2)
T4 FREE SERPL-MCNC: 0.59 NG/DL (ref 0.76–1.46)
TSH SERPL DL<=0.005 MIU/L-ACNC: 25.04 MU/L (ref 0.4–4)
WBC # BLD AUTO: 4.9 10E3/UL (ref 4–11)

## 2021-10-18 PROCEDURE — 85025 COMPLETE CBC W/AUTO DIFF WBC: CPT

## 2021-10-18 PROCEDURE — 258N000003 HC RX IP 258 OP 636: Performed by: INTERNAL MEDICINE

## 2021-10-18 PROCEDURE — 84439 ASSAY OF FREE THYROXINE: CPT

## 2021-10-18 PROCEDURE — 82040 ASSAY OF SERUM ALBUMIN: CPT | Performed by: INTERNAL MEDICINE

## 2021-10-18 PROCEDURE — 250N000011 HC RX IP 250 OP 636: Performed by: INTERNAL MEDICINE

## 2021-10-18 PROCEDURE — 36415 COLL VENOUS BLD VENIPUNCTURE: CPT

## 2021-10-18 PROCEDURE — 82310 ASSAY OF CALCIUM: CPT | Performed by: INTERNAL MEDICINE

## 2021-10-18 PROCEDURE — 96374 THER/PROPH/DIAG INJ IV PUSH: CPT

## 2021-10-18 PROCEDURE — 84443 ASSAY THYROID STIM HORMONE: CPT

## 2021-10-18 PROCEDURE — 82565 ASSAY OF CREATININE: CPT | Performed by: INTERNAL MEDICINE

## 2021-10-18 RX ORDER — ZOLEDRONIC ACID 0.04 MG/ML
4 INJECTION, SOLUTION INTRAVENOUS ONCE
Status: COMPLETED | OUTPATIENT
Start: 2021-10-18 | End: 2021-10-18

## 2021-10-18 RX ORDER — ZOLEDRONIC ACID 0.04 MG/ML
4 INJECTION, SOLUTION INTRAVENOUS ONCE
Status: CANCELLED | OUTPATIENT
Start: 2021-10-19 | End: 2021-10-19

## 2021-10-18 RX ADMIN — SODIUM CHLORIDE 250 ML: 9 INJECTION, SOLUTION INTRAVENOUS at 10:17

## 2021-10-18 RX ADMIN — ZOLEDRONIC ACID 4 MG: 0.04 INJECTION, SOLUTION INTRAVENOUS at 10:53

## 2021-10-18 ASSESSMENT — PAIN SCALES - GENERAL: PAINLEVEL: NO PAIN (0)

## 2021-10-18 NOTE — PATIENT INSTRUCTIONS
[facility] Triage and after hours / weekends / holidays:  [phone]    Please call the triage or after hours line if you experience a temperature greater than or equal to 100.5, shaking chills, have uncontrolled nausea, vomiting and/or diarrhea, dizziness, shortness of breath, chest pain, bleeding, unexplained bruising, or if you have any other new/concerning symptoms, questions or concerns.      If you are having any concerning symptoms or wish to speak to a provider before your next infusion visit, please call your care coordinator or triage to notify them so we can adequately serve you.     If you need a refill on a narcotic prescription or other medication, please call before your infusion appointment.       October 2021 Sunday Monday Tuesday Wednesday Thursday Friday Saturday                            1     2       3     4     5     6     7     8     9       10     11     12     13     14     15     16       17     18    LAB CENTRAL   9:00 AM   (15 min.)   [facility] LAB DRAW   [facility]    ONC INFUSION 1 HR (60 MIN)   9:30 AM   (60 min.)    ONC INFUSION NURSE   [facility] 19     20     21     22     23       24     25     26     27     28     29     30       31                                               November 2021 Sunday Monday Tuesday Wednesday Thursday Friday Saturday        1     2     3     4     5     6       7     8     9     10     11     12     13       14     15     16     17     18     19     20       21     22     23     24     25     26     27       28     29     30                                        Recent Results (from the past 24 hour(s))   Creatinine    Collection Time: 10/18/21 10:03 AM   Result Value Ref Range    Creatinine 0.80 0.52 - 1.04 mg/dL    GFR Estimate 78 >60 mL/min/1.73m2   Calcium    Collection Time: 10/18/21 10:03 AM   Result Value Ref Range    Calcium 9.3 8.5 - 10.1 mg/dL   Albumin level     Collection Time: 10/18/21 10:03 AM   Result Value Ref Range    Albumin 3.7 3.4 - 5.0 g/dL   TSH with free T4 reflex    Collection Time: 10/18/21 10:03 AM   Result Value Ref Range    TSH 25.04 (H) 0.40 - 4.00 mU/L   CBC with platelets and differential    Collection Time: 10/18/21 10:03 AM   Result Value Ref Range    WBC Count 4.9 4.0 - 11.0 10e3/uL    RBC Count 4.14 3.80 - 5.20 10e6/uL    Hemoglobin 12.5 11.7 - 15.7 g/dL    Hematocrit 38.2 35.0 - 47.0 %    MCV 92 78 - 100 fL    MCH 30.2 26.5 - 33.0 pg    MCHC 32.7 31.5 - 36.5 g/dL    RDW 14.7 10.0 - 15.0 %    Platelet Count 230 150 - 450 10e3/uL    % Neutrophils 65 %    % Lymphocytes 23 %    % Monocytes 9 %    % Eosinophils 2 %    % Basophils 1 %    % Immature Granulocytes 0 %    NRBCs per 100 WBC 0 <1 /100    Absolute Neutrophils 3.2 1.6 - 8.3 10e3/uL    Absolute Lymphocytes 1.1 0.8 - 5.3 10e3/uL    Absolute Monocytes 0.4 0.0 - 1.3 10e3/uL    Absolute Eosinophils 0.1 0.0 - 0.7 10e3/uL    Absolute Basophils 0.1 0.0 - 0.2 10e3/uL    Absolute Immature Granulocytes 0.0 <=0.0 10e3/uL    Absolute NRBCs 0.0 10e3/uL   T4 free    Collection Time: 10/18/21 10:03 AM   Result Value Ref Range    Free T4 0.59 (L) 0.76 - 1.46 ng/dL

## 2021-10-18 NOTE — PROGRESS NOTES
Infusion Nursing Note:  Remedios Osorio presents today for zometa.    Patient seen by provider today: No   present during visit today: Not Applicable.    Note:   Patient is feeling well today. She has no complaints. Her peripheral neuropathy is at baseline with no change. She denies fevers, chills, SOB, diarrhea, nausea, and pain.    Intravenous Access:  Peripheral IV placed by lab.    Treatment Conditions:  Lab Results   Component Value Date    HGB 12.5 10/18/2021    WBC 4.9 10/18/2021    ANEU 3.1 02/02/2021    ANEUTAUTO 3.2 10/18/2021     10/18/2021      Lab Results   Component Value Date     02/02/2021    POTASSIUM 4.1 02/02/2021    CR 0.80 10/18/2021    ANAYELI 9.3 10/18/2021    BILITOTAL 0.3 02/02/2021    ALBUMIN 3.7 10/18/2021    ALT 35 02/02/2021    AST 12 02/02/2021     Results reviewed, labs MET treatment parameters, ok to proceed with treatment.      Post Infusion Assessment:  Patient tolerated infusion without incident.  Blood return noted pre and post infusion.  Site patent and intact, free from redness, edema or discomfort.  No evidence of extravasations.  Access discontinued per protocol.       Discharge Plan:   Patient declined prescription refills.  Discharge instructions reviewed with: Patient.  Patient and/or family verbalized understanding of discharge instructions and all questions answered.  Copy of AVS reviewed with patient.  Patient will return 2/21/22 for follow up with Dr. Patel appointment.   Patient discharged in stable condition accompanied by: self.  Departure Mode: Ambulatory.      Archana Garcia RN

## 2021-10-18 NOTE — NURSING NOTE
Chief Complaint   Patient presents with     Blood Draw     Labs drawn via PIV placed by RN in lab. VS taken.      Labs drawn from PIV placed by RN. Line flushed with saline. Vitals taken. Pt checked in for appointment(s).    Rosa Wick RN

## 2021-10-20 ENCOUNTER — NURSE TRIAGE (OUTPATIENT)
Dept: ONCOLOGY | Facility: CLINIC | Age: 66
End: 2021-10-20

## 2021-10-20 NOTE — TELEPHONE ENCOUNTER
"S: Concern about side effects of zometa infusion.    B: Pt states she had her 4th zometa infusion on Monday (10/18). Monday she felt fine for most of the day, but then had some pain in her knees and hips later in the day. Tuesday, she woke up and felt \"hung over.\" She went to work, started crying--burst into tears, felt depleted and out of energy, and had some tingling in fingers and toes. Today, she feels she has no stamina, feels heaviness and achy in joints of both legs, and has less tingling in fingers and toes than on Tuesday. Tingling in fingers and toes is different than her neuropathy that she had from chemotherapy previously.   Is not taking medication for the joint pain.   Pt states that before the treatment on Monday, she has never felt this bad. She does not think this is related to her underactive thyroid.     A: Informed pt that I would route concerns to her provider and care team for their recommendations. Pt is in agreement with this plan.    R: Routed to Dr. Patel and [facility].    1406: Per Dr. Patel. If symptoms are from Zometa, they typically resolve in 2-3 days. Pt should drink 48-64 oz of water per day. Can take tylenol for pain and can alternate tylenol and ibuprofen for pain.     Call made to Remedios's work number per her request, but was unable to connect with her.  Called pt's home phone number and LVM with Dr. Patel's recommendations and Triage phone number.     10/21/21 0825: Call made to pt to make sure she got the voice mail message left yesterday. Pt was not at work yet. This writer will call back.    0930: Received a call from Remedios who reports that she did get the message and that she is feeling much better. She is not weepy, has more energy, but still has heaviness in joints, which is not too bad today. She is at work today. Pt states she was very happy with the follow up as these side effects took her by surprise.       "

## 2021-11-17 NOTE — PROGRESS NOTES
[facility]    Outpatient Physical Therapy Discharge Note  Patient: Remedios Osorio  : 1955    Beginning/End Dates of Reporting Period:  21 to 21    Referring Provider: Kanchan Patel MD    Therapy Diagnosis: Lymphedema of the left upper extremity     Client Self Report: Did not yet get fit for compression bra or sleeve. Does not have any vacation time currently after a work related COVID exposure. Has been doing more walking and completing HEP daily.     Objective Measurements:  Objective Measure: UE Volume  Details: RUE: 2777.73 mL; LUE 2734.24 mL  Objective Measure: Breast edema  Details: more soft, mild peau de orange tissue at inferior dependent breast areas       Outcome Measures (most recent score):       Goals:  Goal Identifier Edema management   Goal Description Pt will report at least 3 strategies to reduce risk of further edema exacerbations.    Target Date 21   Date Met  21   Progress (detail required for progress note): Able to discuss strategies of exercise, compression, massage and use of swell spot to manage edema     Goal Identifier HEP   Goal Description Pt will demonstrate accurate completion of targeted HEP of self MLD, compression and exercise to aid in edema reduction and management.    Target Date 21   Date Met  21   Progress (detail required for progress note): IND with home program of exercise, self massage and use of swell spot     Goal Identifier Compression   Goal Description Pt will be fit and utilize appropriate compression to LUE during activity and travel to prevent edema exacerbations.    Target Date 21   Date Met      Progress (detail required for progress note): Pt has not yet been fit for compression bra or sleeve, plans to try and make appt next week       Plan:  Discharge from therapy.    Discharge:    Reason for  Discharge: Patient has met 2/3 goals. Pt has information for fitting for garments, had not yet scheduled a fitting at final scheduled appt.    Equipment Issued: HEP, recommendations for compression    Discharge Plan: Patient to continue home program.

## 2021-12-28 ENCOUNTER — HOSPITAL ENCOUNTER (OUTPATIENT)
Dept: PHYSICAL THERAPY | Facility: CLINIC | Age: 66
Setting detail: THERAPIES SERIES
End: 2021-12-28
Attending: INTERNAL MEDICINE
Payer: COMMERCIAL

## 2021-12-28 PROCEDURE — 97140 MANUAL THERAPY 1/> REGIONS: CPT | Mod: GP | Performed by: PHYSICAL THERAPIST

## 2022-01-08 ENCOUNTER — HEALTH MAINTENANCE LETTER (OUTPATIENT)
Age: 67
End: 2022-01-08

## 2022-02-19 NOTE — PROGRESS NOTES
MEDICAL ONCOLOGY FOLLOW-UP PATIENT VISIT (Video Visit)    NAME: Remedios Osorio     DATE: 2/21/2022    PRIMARY CARE PHYSICIAN: Kae Caal    PATIENT ID: Clinical Stage II-B Breast Cancer    Oncology History: Remedios Osorio is a 66 year old female with h/o stage IIIa, [id number], ER positive, VA positive, HER2 non-amplified invasive ductal carcinoma of the left breast. Her PCP palpated a mass in the lower outer left breast in 01/2016. Diagnostic mammogram and ultrasound showed a 3.3 cm mass at 4:30, 8 cm from the nipple. She was also found to have multiple abnormal left axillary lymph nodes. The largest one was 2.4 cm. Biopsy of her left breast mass demonstrated invasive ductal carcinoma, grade 3 with extensive tumor necrosis. Axillary LN biopsy was also positive for malignancy. The tumor cells were strongly positive for estrogen receptor (> 95%) and moderate to strongly positive for progesterone receptor (60-70%). HER2/lizz was non-amplified by FISH.  PET/CT showed the left breast mass, 5 hypermetabolic left axillary lymph nodes, indeterminate left cervical chain lymph nodes, and an indeterminate hypodensity in the dome of the liver. She received 11 cycles of weekly Taxol, the 12th was cancelled due to neuropathy. She received one cycle of ddAC but did not tolerate it due to fatigue and generalized weakness. She declined further chemotherapy.    Left breast lumpectomy and left axillary lymph node dissection was performed by Dr. Amezcua on 6/29/16.  Pathology showed a residual 1.6 cm grade 2, IDC in the left breast.  Surgical margins were negative.  Invasive tumor cellularity of 30%.  Tumor infiltrating lymphocytes were estimated at 50%.  6/19 excised lymph nodes were involved with malignancy.  The largest lymph node metastasis measured 3.9 cm and had a 1 mm area of extranodal extension.  Minimal treatment related changes were noted in the lymph nodes.  Pathologic staging was [id number], or stage IIIa.  MD Valdez  residual cancer burden was Class III.  Adjuvant Xeloda was recommended, she declined.  She completed radiation on 10/17/16.  She declined zometa for prevention of bone metastases.  She has been on letrozole since early 09/2016.    Interim History:   Ms. Osorio presents to clinic for routine breast cancer follow up.  She has been doing well medically. She has a lot of stress with work and at home. Since doing lymphedema therapy, her lymphedema hasn't returned. She has stretching and massage exercises that she is able to do at home.  Her lower back and knee pain have also improved. She is seeing a chiropractor for her back pain which has in turn improved her knee pain. She has also started a new homeopathic regimen for her thyroid insufficiency. She is testing monthly at home and she has had some supplements prescribed. Her sister, who is a homeopathic practitioner, is helping her with the monitoring. She has hot flashes, maybe once a week, and isn't bothered by them. She denies new lumps, bone pain, edema, SOB or chest pain.    She is trying to lose weight. Now that she is feeling better, she is starting to walk around the atrium at work again. She lost weight previously on a keto diet and admits to eating carbs now. She is motivated to reduce stress and improve her diet.  The remainder of a complete 12 point review of systems was reviewed with the patient and was negative with the exception of that mentioned above.    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       PAST SURGICAL HISTORY:  Past Surgical History:   Procedure Laterality Date      CL AFF SURGICAL PATHOLOGY  2005    UTERINE LEIOMYOMA  [D25.9]     HC BIOPSY/EXCIS CERVICAL LESION W/WO FULGURATION  08-15-00,12-    endocervical polypectomy     HC TOOTH EXTRACTION W/FORCEP      wisdom teeth     LUMPECTOMY BREAST WITH SENTINEL NODE, COMBINED Left 6/29/2016    Segmental mastectomy with axillary lymph node dissection     REMOVE PORT VASCULAR ACCESS Right 6/29/2016    Procedure: REMOVE PORT VASCULAR ACCESS;  Surgeon: Gianna Amezcua MD;  Location:  OR     MEDS:  Current Outpatient Medications   Medication     Calcium-Magnesium-Vitamin D (CALCIUM MAGNESIUM PO)     letrozole (FEMARA) 2.5 MG tablet     melatonin 3 MG CAPS     order for DME     VITAMIN D PO     No current facility-administered medications for this visit.     Facility-Administered Medications Ordered in Other Visits   Medication     lidocaine BUFFERED 1 % solution 10 mL       ALLERGIES:  Allergies   Allergen Reactions     Benadryl [Diphenhydramine] Other (See Comments)     Pt had severe hypotension, nausea and vasovagal type reaction to IV Benadryl.   Give PO only.      Cats      Keflex [Cephalexin]      rash        PHYSICAL EXAM:  BP (!) 142/78   Pulse 80   Temp 97.9  F (36.6  C) (Tympanic)   Resp 16   Wt 116.5 kg (256 lb 14.4 oz)   LMP 10/28/2009   SpO2 97%   BMI 45.98 kg/m    LMP 10/28/2009   General:  Obese adult female in NAD.  Alert and oriented.  HEENT:  Normocephalic.  Sclera anicteric.  MMM.  No lesions of the oropharynx.  Lymph:  No palpable cervical, supraclavicular, or axillary LAD.  Chest:  CTA bilaterally.  No wheezes or crackles.  CV:  RRR.  Nl S1 and S2.    Breast:  Bilateral breasts are of normal fibroglandular density.  There are no discretely palpable masses in either breast.  Abd:  Soft/ND.    Ext:  No pitting edema of the bilateral lower extremities.    Musculo:  Full ROM of the bilateral upper extremities.  Neuro:  Cranial nerves grossly intact.  Psych:  Mood and affect appear normal.  Skin:   Lower left lateral chest wall with a raised lesion with erythematous base and white crusting.    LABS REVIEWED THIS VISIT:  2/21/2022 Labs:  Electrolytes, creatinine, and LFTs are wnl.  CA27.29 breast cancer tumor marker is wnl.    TSH is elevated at 4.58 and free T4 is wnl c/w subclinical hypothyroidism.    Blood counts are wnl.    2/2/2021 DEXA bone density scan:  Lowest T-score = -1.2 at the distal radius, 0.7 in the L-spine, and 0.0 in the left femoral neck    IMPRESSION/PLAN: Ms. Osorio is a 66 year old female with a h/o stage IIIa, Q8mC7S5, ER/MD positive, HER2 negative left breast cancer. She is s/p 11 weeks of weekly taxol, 1 cycle of ddAC, left breast lumpectomy, ALND, and adjuvant radiation.  She has been on letrozole since early September, 2016.      1.  Stage III breast cancer:  Remedios is 5 years, 8 months out from excision of a left breast cancer.  She is on treatment with letrozole.  She is generally tolerating the medication well.  We plan to continue letrozole to complete a total 10 years of endocrine therapy (through 09/2026).      She is asymptomatic of disease recurrence on exam today and clinical exam is without concerning findings.  I will see her back in 6 months for her next visit.  She will be due for mammograms at that time.    2.  Bone Health:  DEXA in 02/2021 with a lowest T-score of -1.2 in the distal radius c/w osteopenia.  She is s/p treatment with 2 years Zometa from 09/2019 - 10/2021 for prevention of breast cancer bone metastases.  She states she had terrible joint pain and fatigue following the last dose and is happy she will not be receiving further doses.  At this point, recommend she take calcium 1200 mg, vitamin D 1000 international unit(s), and 20-30 minutes of daily weight bearing activity.  Will repeat a DEXA in 02/2023.      3.  Lymphedema:  Mild lymphedema of the left breast on exam today.  Continue compression and massage.    4.  Morbid obesity:  I am very concerned that her  weight continues to increase.  Her BMI is in the morbidly obese range.  Patients who gain weight after breast cancer treatment have worse breast cancer outcomes. Discussed weight management clinic and she declines a referral at this time, but will let us know if she would like one in the future.  In the meantime, she will decrease carbohydrate intake and resume routine exercise.    5.  Hypothyroidism:  She declines treatment with levothyroxine.  Despite my recommendations against it, she takes an iodine supplement.  Recheck today is 4.58 with T4 free of 0.87.  She is working with a homeopath.  We reviewed ongoing hypothyroidism is likely a barrier to weight loss.    6.  Lower left chest wall skin lesion:  Per patient is intermittently falling off and bleeding.  Will refer to dermatology for further evaluation.    7.  Routine health maintenance:  Primary care referral placed so that patient may establish routine health maintenance.    8.  Follow Up:  Return to clinic in approximately 6 months for labs, bilateral screening mammograms and visit with me.  Needs to be 8/16 or later, but NOT on 8/19. Referral to dermatologist and primary care.    35 minutes spent on the date of the encounter doing chart review, review of test results, interpretation of tests, patient visit and documentation.

## 2022-02-21 ENCOUNTER — ONCOLOGY VISIT (OUTPATIENT)
Dept: ONCOLOGY | Facility: CLINIC | Age: 67
End: 2022-02-21
Attending: INTERNAL MEDICINE
Payer: COMMERCIAL

## 2022-02-21 ENCOUNTER — APPOINTMENT (OUTPATIENT)
Dept: LAB | Facility: CLINIC | Age: 67
End: 2022-02-21
Attending: INTERNAL MEDICINE
Payer: COMMERCIAL

## 2022-02-21 VITALS
DIASTOLIC BLOOD PRESSURE: 78 MMHG | WEIGHT: 256.9 LBS | HEART RATE: 80 BPM | RESPIRATION RATE: 16 BRPM | SYSTOLIC BLOOD PRESSURE: 142 MMHG | OXYGEN SATURATION: 97 % | TEMPERATURE: 97.9 F | BODY MASS INDEX: 45.98 KG/M2

## 2022-02-21 DIAGNOSIS — Z12.31 VISIT FOR SCREENING MAMMOGRAM: ICD-10-CM

## 2022-02-21 DIAGNOSIS — E06.3 HYPOTHYROIDISM DUE TO HASHIMOTO'S THYROIDITIS: ICD-10-CM

## 2022-02-21 DIAGNOSIS — L98.9 SKIN LESION: ICD-10-CM

## 2022-02-21 DIAGNOSIS — Z17.0 MALIGNANT NEOPLASM OF LOWER-OUTER QUADRANT OF LEFT BREAST OF FEMALE, ESTROGEN RECEPTOR POSITIVE (H): Primary | ICD-10-CM

## 2022-02-21 DIAGNOSIS — E66.01 MORBID OBESITY (H): ICD-10-CM

## 2022-02-21 DIAGNOSIS — Z00.00 ROUTINE HEALTH MAINTENANCE: ICD-10-CM

## 2022-02-21 DIAGNOSIS — Z92.21 STATUS POST CHEMOTHERAPY: ICD-10-CM

## 2022-02-21 DIAGNOSIS — C50.512 MALIGNANT NEOPLASM OF LOWER-OUTER QUADRANT OF LEFT BREAST OF FEMALE, ESTROGEN RECEPTOR POSITIVE (H): Primary | ICD-10-CM

## 2022-02-21 LAB
ALBUMIN SERPL-MCNC: 3.7 G/DL (ref 3.4–5)
ALP SERPL-CCNC: 60 U/L (ref 40–150)
ALT SERPL W P-5'-P-CCNC: 44 U/L (ref 0–50)
ANION GAP SERPL CALCULATED.3IONS-SCNC: 8 MMOL/L (ref 3–14)
AST SERPL W P-5'-P-CCNC: 23 U/L (ref 0–45)
BASOPHILS # BLD AUTO: 0.1 10E3/UL (ref 0–0.2)
BASOPHILS NFR BLD AUTO: 1 %
BILIRUB SERPL-MCNC: 0.3 MG/DL (ref 0.2–1.3)
BUN SERPL-MCNC: 16 MG/DL (ref 7–30)
CALCIUM SERPL-MCNC: 9.7 MG/DL (ref 8.5–10.1)
CANCER AG27-29 SERPL-ACNC: 12 U/ML (ref 0–39)
CHLORIDE BLD-SCNC: 102 MMOL/L (ref 94–109)
CO2 SERPL-SCNC: 28 MMOL/L (ref 20–32)
CREAT SERPL-MCNC: 0.73 MG/DL (ref 0.52–1.04)
EOSINOPHIL # BLD AUTO: 0.5 10E3/UL (ref 0–0.7)
EOSINOPHIL NFR BLD AUTO: 7 %
ERYTHROCYTE [DISTWIDTH] IN BLOOD BY AUTOMATED COUNT: 14.7 % (ref 10–15)
GFR SERPL CREATININE-BSD FRML MDRD: >90 ML/MIN/1.73M2
GLUCOSE BLD-MCNC: 113 MG/DL (ref 70–99)
HCT VFR BLD AUTO: 39 % (ref 35–47)
HGB BLD-MCNC: 12.9 G/DL (ref 11.7–15.7)
IMM GRANULOCYTES # BLD: 0 10E3/UL
IMM GRANULOCYTES NFR BLD: 0 %
LYMPHOCYTES # BLD AUTO: 1.6 10E3/UL (ref 0.8–5.3)
LYMPHOCYTES NFR BLD AUTO: 24 %
MCH RBC QN AUTO: 30.1 PG (ref 26.5–33)
MCHC RBC AUTO-ENTMCNC: 33.1 G/DL (ref 31.5–36.5)
MCV RBC AUTO: 91 FL (ref 78–100)
MONOCYTES # BLD AUTO: 0.6 10E3/UL (ref 0–1.3)
MONOCYTES NFR BLD AUTO: 9 %
NEUTROPHILS # BLD AUTO: 3.8 10E3/UL (ref 1.6–8.3)
NEUTROPHILS NFR BLD AUTO: 59 %
NRBC # BLD AUTO: 0 10E3/UL
NRBC BLD AUTO-RTO: 0 /100
PLATELET # BLD AUTO: 241 10E3/UL (ref 150–450)
POTASSIUM BLD-SCNC: 4.5 MMOL/L (ref 3.4–5.3)
PROT SERPL-MCNC: 7.6 G/DL (ref 6.8–8.8)
RBC # BLD AUTO: 4.29 10E6/UL (ref 3.8–5.2)
SODIUM SERPL-SCNC: 138 MMOL/L (ref 133–144)
T4 FREE SERPL-MCNC: 0.87 NG/DL (ref 0.76–1.46)
TSH SERPL DL<=0.005 MIU/L-ACNC: 4.58 MU/L (ref 0.4–4)
WBC # BLD AUTO: 6.4 10E3/UL (ref 4–11)

## 2022-02-21 PROCEDURE — 84439 ASSAY OF FREE THYROXINE: CPT | Performed by: INTERNAL MEDICINE

## 2022-02-21 PROCEDURE — 85025 COMPLETE CBC W/AUTO DIFF WBC: CPT | Performed by: INTERNAL MEDICINE

## 2022-02-21 PROCEDURE — 82040 ASSAY OF SERUM ALBUMIN: CPT | Performed by: INTERNAL MEDICINE

## 2022-02-21 PROCEDURE — G0463 HOSPITAL OUTPT CLINIC VISIT: HCPCS

## 2022-02-21 PROCEDURE — 36415 COLL VENOUS BLD VENIPUNCTURE: CPT | Performed by: INTERNAL MEDICINE

## 2022-02-21 PROCEDURE — 86300 IMMUNOASSAY TUMOR CA 15-3: CPT | Performed by: INTERNAL MEDICINE

## 2022-02-21 PROCEDURE — 84443 ASSAY THYROID STIM HORMONE: CPT | Performed by: INTERNAL MEDICINE

## 2022-02-21 PROCEDURE — 80053 COMPREHEN METABOLIC PANEL: CPT | Performed by: INTERNAL MEDICINE

## 2022-02-21 PROCEDURE — 99214 OFFICE O/P EST MOD 30 MIN: CPT | Performed by: INTERNAL MEDICINE

## 2022-02-21 RX ORDER — LETROZOLE 2.5 MG/1
2.5 TABLET, FILM COATED ORAL DAILY
Qty: 90 TABLET | Refills: 3 | Status: SHIPPED | OUTPATIENT
Start: 2022-02-21 | End: 2023-03-09

## 2022-02-21 ASSESSMENT — PAIN SCALES - GENERAL: PAINLEVEL: NO PAIN (0)

## 2022-02-21 NOTE — NURSING NOTE
"Oncology Rooming Note    February 21, 2022 3:35 PM   Remedios Osorio is a 66 year old female who presents for:    Chief Complaint   Patient presents with     Blood Draw     Labs drawn via VPT by RN in lab. VS taken.      Oncology Clinic Visit     Malignant neoplasn of lower-outer quadrant of left female breast      Initial Vitals: BP (!) 142/78   Pulse 80   Temp 97.9  F (36.6  C) (Tympanic)   Resp 16   Wt 116.5 kg (256 lb 14.4 oz)   LMP 10/28/2009   SpO2 97%   BMI 45.98 kg/m   Estimated body mass index is 45.98 kg/m  as calculated from the following:    Height as of 8/16/21: 1.592 m (5' 2.68\").    Weight as of this encounter: 116.5 kg (256 lb 14.4 oz). Body surface area is 2.27 meters squared.  No Pain (0) Comment: Data Unavailable   Patient's last menstrual period was 10/28/2009.  Allergies reviewed: Yes  Medications reviewed: Yes    Medications: Medication refills not needed today.  Pharmacy name entered into [employer]:    [facility] [place] - [place], [place] - [address].  [id number] - [place], [place] [address] [facility] [address] [place] [place] [address] [facility] [place] [address] [facility] [place], [place] [address] [facility] [place], [place] - [address]  [facility] [place], [place] - [address] [facility] [place], [place] - [address]    Clinical concerns: Patient states that she is taking homeopathic supplements to help with her hypothyroidism. Taking: Jonathon-Mag-Citrate-powder, GTA, Nuclezyme-Forte, Bio-Trophic, lodizyme-HP, Marcelo-Stim.  Manual BP assessed today in clinic 142/78      Thuy Wheatley LPN February 21, 2022 3:38 PM                "

## 2022-02-21 NOTE — LETTER
2/21/2022     RE: Remedios Osorio  [address]  [place] [address]        Dear Colleague,    Thank you for referring your patient, Remedios Osorio, to the [facility]. Please see a copy of my visit note below.    MEDICAL ONCOLOGY FOLLOW-UP PATIENT VISIT (Video Visit)    NAME: Remedios Osorio     DATE: 2/21/2022    PRIMARY CARE PHYSICIAN: Kae Caal    PATIENT ID: Clinical Stage II-B Breast Cancer    Oncology History: Remedios Osorio is a 66 year old female with h/o stage IIIa, [id number], ER positive, MI positive, HER2 non-amplified invasive ductal carcinoma of the left breast. Her PCP palpated a mass in the lower outer left breast in 01/2016. Diagnostic mammogram and ultrasound showed a 3.3 cm mass at 4:30, 8 cm from the nipple. She was also found to have multiple abnormal left axillary lymph nodes. The largest one was 2.4 cm. Biopsy of her left breast mass demonstrated invasive ductal carcinoma, grade 3 with extensive tumor necrosis. Axillary LN biopsy was also positive for malignancy. The tumor cells were strongly positive for estrogen receptor (> 95%) and moderate to strongly positive for progesterone receptor (60-70%). HER2/lizz was non-amplified by FISH.  PET/CT showed the left breast mass, 5 hypermetabolic left axillary lymph nodes, indeterminate left cervical chain lymph nodes, and an indeterminate hypodensity in the dome of the liver. She received 11 cycles of weekly Taxol, the 12th was cancelled due to neuropathy. She received one cycle of ddAC but did not tolerate it due to fatigue and generalized weakness. She declined further chemotherapy.    Left breast lumpectomy and left axillary lymph node dissection was performed by Dr. Amezcua on 6/29/16.  Pathology showed a residual 1.6 cm grade 2, IDC in the left breast.  Surgical margins were negative.  Invasive tumor cellularity of 30%.  Tumor infiltrating lymphocytes were estimated at 50%.  6/19 excised lymph nodes  were involved with malignancy.  The largest lymph node metastasis measured 3.9 cm and had a 1 mm area of extranodal extension.  Minimal treatment related changes were noted in the lymph nodes.  Pathologic staging was H9dS3T3, or stage IIIa.  [facility] residual cancer burden was Class III.  Adjuvant Xeloda was recommended, she declined.  She completed radiation on 10/17/16.  She declined zometa for prevention of bone metastases.  She has been on letrozole since early 09/2016.    Interim History:   Ms. Osorio presents to clinic for routine breast cancer follow up.  She has been doing well medically. She has a lot of stress with work and at home. Since doing lymphedema therapy, her lymphedema hasn't returned. She has stretching and massage exercises that she is able to do at home.  Her lower back and knee pain have also improved. She is seeing a chiropractor for her back pain which has in turn improved her knee pain. She has also started a new homeopathic regimen for her thyroid insufficiency. She is testing monthly at home and she has had some supplements prescribed. Her sister, who is a homeopathic practitioner, is helping her with the monitoring. She has hot flashes, maybe once a week, and isn't bothered by them. She denies new lumps, bone pain, edema, SOB or chest pain.    She is trying to lose weight. Now that she is feeling better, she is starting to walk around the atrium at work again. She lost weight previously on a keto diet and admits to eating carbs now. She is motivated to reduce stress and improve her diet.  The remainder of a complete 12 point review of systems was reviewed with the patient and was negative with the exception of that mentioned above.    PAST MEDICAL HISTORY:   Past Medical History:   Diagnosis Date     ABNL LIVER [facility] STUDY  W/ MONO  5/01     Breast cancer (H)      CHR BLOOD LOSS ANEMIA DUE TO FIBROIDS 6/9/2005     ELEVATED HBA1C 6.2% 6/05 8/6/2005     HX MUCOUS POLYPS OF CERVIX  2000      HYPERLIPIDEMIA       Infectious mononucleosis 5/01     MENORRHAGIA      MIGRAINE HEADACHES      MORBID OBESITY BMI 44.79 6/05 6/12/2005     UTERINE LEIOMYOMA  2/7/2003       PAST SURGICAL HISTORY:  Past Surgical History:   Procedure Laterality Date     CL AFF SURGICAL PATHOLOGY  2005    UTERINE LEIOMYOMA  [D25.9]     HC BIOPSY/EXCIS CERVICAL LESION W/WO FULGURATION  08-15-00,12-    endocervical polypectomy     HC TOOTH EXTRACTION W/FORCEP      wisdom teeth     LUMPECTOMY BREAST WITH SENTINEL NODE, COMBINED Left 6/29/2016    Segmental mastectomy with axillary lymph node dissection     REMOVE PORT VASCULAR ACCESS Right 6/29/2016    Procedure: REMOVE PORT VASCULAR ACCESS;  Surgeon: Gianna Amezcua MD;  Location:  OR     MEDS:  Current Outpatient Medications   Medication     Calcium-Magnesium-Vitamin D (CALCIUM MAGNESIUM PO)     letrozole (FEMARA) 2.5 MG tablet     melatonin 3 MG CAPS     order for DME     VITAMIN D PO     No current facility-administered medications for this visit.     Facility-Administered Medications Ordered in Other Visits   Medication     lidocaine BUFFERED 1 % solution 10 mL       ALLERGIES:  Allergies   Allergen Reactions     Benadryl [Diphenhydramine] Other (See Comments)     Pt had severe hypotension, nausea and vasovagal type reaction to IV Benadryl.   Give PO only.      Cats      Keflex [Cephalexin]      rash        PHYSICAL EXAM:  BP (!) 142/78   Pulse 80   Temp 97.9  F (36.6  C) (Tympanic)   Resp 16   Wt 116.5 kg (256 lb 14.4 oz)   LMP 10/28/2009   SpO2 97%   BMI 45.98 kg/m    LMP 10/28/2009   General:  Obese adult female in NAD.  Alert and oriented.  HEENT:  Normocephalic.  Sclera anicteric.  MMM.  No lesions of the oropharynx.  Lymph:  No palpable cervical, supraclavicular, or axillary LAD.  Chest:  CTA bilaterally.  No wheezes or crackles.  CV:  RRR.  Nl S1 and S2.    Breast:  Bilateral breasts are of normal fibroglandular density.  There are no discretely  palpable masses in either breast.  Abd:  Soft/ND.    Ext:  No pitting edema of the bilateral lower extremities.    Musculo:  Full ROM of the bilateral upper extremities.  Neuro:  Cranial nerves grossly intact.  Psych:  Mood and affect appear normal.  Skin:  Lower left lateral chest wall with a raised lesion with erythematous base and white crusting.    LABS REVIEWED THIS VISIT:  2/21/2022 Labs:  Electrolytes, creatinine, and LFTs are wnl.  CA27.29 breast cancer tumor marker is wnl.    TSH is elevated at 4.58 and free T4 is wnl c/w subclinical hypothyroidism.    Blood counts are wnl.    2/2/2021 DEXA bone density scan:  Lowest T-score = -1.2 at the distal radius, 0.7 in the L-spine, and 0.0 in the left femoral neck    IMPRESSION/PLAN: Ms. Osorio is a 66 year old female with a h/o stage IIIa, [id number], ER/NH positive, HER2 negative left breast cancer. She is s/p 11 weeks of weekly taxol, 1 cycle of ddAC, left breast lumpectomy, ALND, and adjuvant radiation.  She has been on letrozole since early September, 2016.      1.  Stage III breast cancer:  Remedios is 5 years, 8 months out from excision of a left breast cancer.  She is on treatment with letrozole.  She is generally tolerating the medication well.  We plan to continue letrozole to complete a total 10 years of endocrine therapy (through 09/2026).      She is asymptomatic of disease recurrence on exam today and clinical exam is without concerning findings.  I will see her back in 6 months for her next visit.  She will be due for mammograms at that time.    2.  Bone Health:  DEXA in 02/2021 with a lowest T-score of -1.2 in the distal radius c/w osteopenia.  She is s/p treatment with 2 years Zometa from 09/2019 - 10/2021 for prevention of breast cancer bone metastases.  She states she had terrible joint pain and fatigue following the last dose and is happy she will not be receiving further doses.  At this point, recommend she take calcium 1200 mg, vitamin D 1000  international unit(s), and 20-30 minutes of daily weight bearing activity.  Will repeat a DEXA in 02/2023.      3.  Lymphedema:  Mild lymphedema of the left breast on exam today.  Continue compression and massage.    4.  Morbid obesity:  I am very concerned that her weight continues to increase.  Her BMI is in the morbidly obese range.  Patients who gain weight after breast cancer treatment have worse breast cancer outcomes. Discussed weight management clinic and she declines a referral at this time, but will let us know if she would like one in the future.  In the meantime, she will decrease carbohydrate intake and resume routine exercise.    5.  Hypothyroidism:  She declines treatment with levothyroxine.  Despite my recommendations against it, she takes an iodine supplement.  Recheck today is 4.58 with T4 free of 0.87.  She is working with a homeopath.  We reviewed ongoing hypothyroidism is likely a barrier to weight loss.    6.  Lower left chest wall skin lesion:  Per patient is intermittently falling off and bleeding.  Will refer to dermatology for further evaluation.    7.  Routine health maintenance:  Primary care referral placed so that patient may establish routine health maintenance.    8.  Follow Up:  Return to clinic in approximately 6 months for labs, bilateral screening mammograms and visit with me.  Needs to be 8/16 or later, but NOT on 8/19. Referral to dermatologist and primary care.    35 minutes spent on the date of the encounter doing chart review, review of test results, interpretation of tests, patient visit and documentation.         Again, thank you for allowing me to participate in the care of your patient.        Sincerely,        Kanchan Patel MD

## 2022-02-21 NOTE — NURSING NOTE
Chief Complaint   Patient presents with     Blood Draw     Labs drawn via VPT by RN in lab. VS taken.      Labs collected from venipuncture by RN. Vitals taken. Checked in for appointment(s).    Christiana CHU RN PHN BSN  BMT/Oncology Lab

## 2022-02-24 ENCOUNTER — TELEPHONE (OUTPATIENT)
Dept: INTERNAL MEDICINE | Facility: CLINIC | Age: 67
End: 2022-02-24
Payer: COMMERCIAL

## 2022-02-24 NOTE — TELEPHONE ENCOUNTER
LVM w/ pcc number to callback and schedule establish care visit with any provider in primary care as requested by Dr. Kanchan Patel

## 2022-03-02 ENCOUNTER — TELEPHONE (OUTPATIENT)
Dept: DERMATOLOGY | Facility: CLINIC | Age: 67
End: 2022-03-02
Payer: COMMERCIAL

## 2022-04-08 ENCOUNTER — OFFICE VISIT (OUTPATIENT)
Dept: URGENT CARE | Facility: URGENT CARE | Age: 67
End: 2022-04-08
Payer: COMMERCIAL

## 2022-04-08 ENCOUNTER — TELEPHONE (OUTPATIENT)
Dept: URGENT CARE | Facility: URGENT CARE | Age: 67
End: 2022-04-08

## 2022-04-08 VITALS
SYSTOLIC BLOOD PRESSURE: 154 MMHG | DIASTOLIC BLOOD PRESSURE: 70 MMHG | HEART RATE: 97 BPM | OXYGEN SATURATION: 97 % | RESPIRATION RATE: 12 BRPM | BODY MASS INDEX: 46.17 KG/M2 | WEIGHT: 258 LBS | TEMPERATURE: 98.6 F

## 2022-04-08 DIAGNOSIS — H00.015 HORDEOLUM EXTERNUM OF LEFT LOWER EYELID: Primary | ICD-10-CM

## 2022-04-08 PROCEDURE — 99213 OFFICE O/P EST LOW 20 MIN: CPT | Performed by: PHYSICIAN ASSISTANT

## 2022-04-08 ASSESSMENT — ENCOUNTER SYMPTOMS
EYE PAIN: 1
EYE REDNESS: 0
EYE ITCHING: 1
FEVER: 0

## 2022-04-09 NOTE — PROGRESS NOTES
SUBJECTIVE:   Remedios Osorio is a 66 year old female presenting with a chief complaint of   Chief Complaint   Patient presents with     Eye Problem     Left Eye Started Hurting Yesterday. Puffy This Morning and Red In The Corner.       She is an established patient of [facility].  Patient presents with complaints of left eye erythema, itching and pain and edema.  No eye discharge.  Wears contacts.  No trauma.        Review of Systems   Constitutional: Negative for fever.   Eyes: Positive for pain and itching. Negative for redness and visual disturbance.        Left lid edema and erythema   All other systems reviewed and are negative.      Past Medical History:   Diagnosis Date     ABNL LIVER FUNC STUDY  W/ MONO  5/01     Breast cancer (H)      CHR BLOOD LOSS ANEMIA DUE TO FIBROIDS 6/9/2005     ELEVATED HBA1C 6.2% 6/05 8/6/2005     HX MUCOUS POLYPS OF CERVIX  2000     HYPERLIPIDEMIA       Infectious mononucleosis 5/01     MENORRHAGIA      MIGRAINE HEADACHES      MORBID OBESITY BMI 44.79 6/05 6/12/2005     UTERINE LEIOMYOMA  2/7/2003     Family History   Problem Relation Age of Onset     C.A.D. Father         [id number]     Diabetes Father         Type 2     Prostate Cancer Father      Alcohol/Drug Father      Hypertension Father      Depression Father      Eye Disorder Father         cataracts bilat     Lipids Father      Thyroid Disease Father         hypo     Hypertension Mother      Alzheimer Disease Mother         DX ~75     Eye Disorder Mother         cataract one eye      Osteoporosis Mother         early stages     Thyroid Disease Mother         hypo     Genitourinary Problems Sister         kidney stones     Lipids Sister      Current Outpatient Medications   Medication Sig Dispense Refill     tobramycin (TOBREX) 0.3 % ophthalmic ointment Place 0.5 inches Into the left eye 2 times daily for 7 days 3.5 g 0     Calcium-Magnesium-Vitamin D (CALCIUM MAGNESIUM PO)        letrozole (FEMARA) 2.5 MG tablet Take 1 tablet  (2.5 mg) by mouth daily 90 tablet 3     melatonin 3 MG CAPS        Multiple Minerals-Vitamins (CALCIUM CITRATE PLUS/MAGNESIUM PO) 1scoop per day per patient       order for DME Equipment being ordered: Compression sleeve, glove, and bra (Patient not taking: Reported on 2/2/2021) 1 Device 3     VITAMIN D PO        Social History     Tobacco Use     Smoking status: Never Smoker     Smokeless tobacco: Never Used   Substance Use Topics     Alcohol use: No     Alcohol/week: 0.0 standard drinks     Comment: no alcohol use       OBJECTIVE  BP (!) 154/70   Pulse 97   Temp 98.6  F (37  C)   Resp 12   Wt 117 kg (258 lb)   LMP 10/28/2009   SpO2 97%   BMI 46.17 kg/m      Physical Exam  Vitals and nursing note reviewed.   Constitutional:       Appearance: Normal appearance. She is obese.   Eyes:      Extraocular Movements: Extraocular movements intact.      Conjunctiva/sclera: Conjunctivae normal.      Pupils: Pupils are equal, round, and reactive to light.      Comments: Left lower lid, lateral aspect with erythema and central edema.  Tender to touch   Cardiovascular:      Rate and Rhythm: Normal rate.   Neurological:      Mental Status: She is alert.         Labs:  No results found for this or any previous visit (from the past 24 hour(s)).    X-Ray was not done.    ASSESSMENT:      ICD-10-CM    1. Hordeolum externum of left lower eyelid  H00.015 tobramycin (TOBREX) 0.3 % ophthalmic ointment        Medical Decision Making:    Differential Diagnosis:  Eye Problem: Stye (external)  Stye (internal)  Blepharitis    Serious Comorbid Conditions:  Adult:  reviewed    PLAN:    Rx for tobramycin ointment.  Patient education.  Discussed reasons to seek immediate medical attention.    No contacts.      Followup:    If not improving or if condition worsens, follow up with your Primary Care Provider, If not improving or if conditions worsens over the next 12-24 hours, go to the Emergency Department    Patient Instructions     Patient  Education     Sty (or Stye)  A sty is when the oil gland of the eyelid becomes inflamed. It may develop into an infection with a small pocket of pus (an abscess). This can cause pain, redness, and swelling. In early stages, a sty is treated with antibiotic cream, eye drops, or a small towel soaked in warm water (a warm compress). More severe cases may need to be opened and drained by a healthcare provider.   Home care    Eye drops or ointment are often prescribed to treat the infection. Use these as directed.     Artificial tears may also be used to lubricate the eye and make it more comfortable. You can buy these over the counter without a prescription. Talk with your healthcare provider before using any over-the-counter treatment for a sty.    Apply a warm, damp towel to the affected area for at least 5 minutes, 3 to 4 times a day for a week. Warm compresses open the pores and speed the healing. Make sure the compresses are not too hot, as they may burn your eyelid.    Sometimes the sty will drain with this treatment alone. If this happens, keep using the antibiotic until all the redness and swelling are gone.    Wash your hands before and after touching the infected eyelid.    Don t squeeze or try to break open the sty.    Follow-up care  Follow up with your healthcare provider, or as advised.   When to seek medical advice  Call your healthcare provider or seek medical care right away if any of these occur:     Increase in swelling or redness around the eyelid after 48 to 72 hours    Increase in eye pain or the eyelid blisters    Increase in warmth--the eyelid feels hot    Drainage of blood or thick pus from the sty    Blister on the eyelid    Inability to open the eyelid due to swelling    Fever of 100.4 F (38 C) or above, or as directed by your provider    Vision changes    Headache or stiff neck    The sty comes back  [employer] last reviewed this educational content on 6/1/2020 2000-2021 [employer]. All rights reserved. This information is not intended as a substitute for professional medical care. Always follow your healthcare professional's instructions.

## 2022-04-09 NOTE — PATIENT INSTRUCTIONS
Patient Education     Sty (or Stye)  A sty is when the oil gland of the eyelid becomes inflamed. It may develop into an infection with a small pocket of pus (an abscess). This can cause pain, redness, and swelling. In early stages, a sty is treated with antibiotic cream, eye drops, or a small towel soaked in warm water (a warm compress). More severe cases may need to be opened and drained by a healthcare provider.   Home care    Eye drops or ointment are often prescribed to treat the infection. Use these as directed.     Artificial tears may also be used to lubricate the eye and make it more comfortable. You can buy these over the counter without a prescription. Talk with your healthcare provider before using any over-the-counter treatment for a sty.    Apply a warm, damp towel to the affected area for at least 5 minutes, 3 to 4 times a day for a week. Warm compresses open the pores and speed the healing. Make sure the compresses are not too hot, as they may burn your eyelid.    Sometimes the sty will drain with this treatment alone. If this happens, keep using the antibiotic until all the redness and swelling are gone.    Wash your hands before and after touching the infected eyelid.    Don t squeeze or try to break open the sty.    Follow-up care  Follow up with your healthcare provider, or as advised.   When to seek medical advice  Call your healthcare provider or seek medical care right away if any of these occur:     Increase in swelling or redness around the eyelid after 48 to 72 hours    Increase in eye pain or the eyelid blisters    Increase in warmth--the eyelid feels hot    Drainage of blood or thick pus from the sty    Blister on the eyelid    Inability to open the eyelid due to swelling    Fever of 100.4 F (38 C) or above, or as directed by your provider    Vision changes    Headache or stiff neck    The sty comes back  [employer] last reviewed this educational content on 6/1/2020 2000-2021 [employer]. All rights reserved. This information is not intended as a substitute for professional medical care. Always follow your healthcare professional's instructions.

## 2022-04-09 NOTE — TELEPHONE ENCOUNTER
Pharmacy calling and stating that they do not have the eye ointment available and only have eye solution on hand. Per Melanie Gil ,PA she prefers ointment but will do solution if nothing available at pharmacy.     Per pharmacist solution would be best option.  Giselle Arellano CMA on 4/8/2022 at 7:49 PM

## 2022-05-24 ENCOUNTER — NURSE TRIAGE (OUTPATIENT)
Dept: NURSING | Facility: CLINIC | Age: 67
End: 2022-05-24
Payer: COMMERCIAL

## 2022-05-24 ENCOUNTER — OFFICE VISIT (OUTPATIENT)
Dept: FAMILY MEDICINE | Facility: CLINIC | Age: 67
End: 2022-05-24
Payer: COMMERCIAL

## 2022-05-24 VITALS
OXYGEN SATURATION: 97 % | DIASTOLIC BLOOD PRESSURE: 96 MMHG | SYSTOLIC BLOOD PRESSURE: 168 MMHG | BODY MASS INDEX: 45.96 KG/M2 | HEART RATE: 97 BPM | RESPIRATION RATE: 22 BRPM | TEMPERATURE: 99.1 F | WEIGHT: 256.8 LBS

## 2022-05-24 DIAGNOSIS — L03.211 FACIAL CELLULITIS: Primary | ICD-10-CM

## 2022-05-24 PROCEDURE — 99214 OFFICE O/P EST MOD 30 MIN: CPT | Performed by: PHYSICIAN ASSISTANT

## 2022-05-24 RX ORDER — CLINDAMYCIN HCL 300 MG
300 CAPSULE ORAL 3 TIMES DAILY
Qty: 30 CAPSULE | Refills: 0 | Status: SHIPPED | OUTPATIENT
Start: 2022-05-24 | End: 2022-06-03

## 2022-05-24 ASSESSMENT — ENCOUNTER SYMPTOMS
FEVER: 0
CHILLS: 0
COLOR CHANGE: 1

## 2022-05-24 NOTE — PATIENT INSTRUCTIONS
1) Take antibiotic as prescribed. Take this medication with food to avoid stomach upset.   2) Ibuprofen or Tylenol as needed for pain.  3) Follow up with plastics at earliest capability.   4) Follow up with us at walk in care if no improvement before your trip.   5) I do recommend getting a primary care provider if you don't have one

## 2022-05-24 NOTE — TELEPHONE ENCOUNTER
Patient calls with concerns regarding nose pain. She denies any injury. Symptoms developed 2 days ago. Currently the left side of her nose is red and swollen, painful to touch. Patient denies any fever. She has had no recent illness, no current nasal congestion. Patient does have a tooth that is getting repaired but it is not infected.     See today in office per protocol. Patient verbalizes understanding, was told by scheduling that there are no appointments available for over a week. Patient is advised on urgent care at this time. She denies further questions or concerns.    Laurie Mares RN  [facility]      Reason for Disposition    SEVERE pain (e.g., excruciating)    Additional Information    Negative: Very deformed or crooked nose    Negative: Breathing through the nose is blocked on one side or both sides    Negative: Fever and increasing nose pain, 2 or more days after injury    Negative: Nosebleed won't stop after 10 minutes of pinching the nostrils closed (applied twice)    Negative: Black and blue skin around both eyes (bilateral periorbital ecchymosis)    Negative: Clear fluid is dripping from the nose    Negative: Skin is split open or gaping (length > 1/4 inch or 6 mm)    Negative: Sounds like a serious injury to the triager    Negative: Wound looks infected    Negative: Nosebleed not from trauma    Negative: Knocked out (unconscious) > 1 minute    Negative: Major bleeding (actively dripping or spurting) that can't be stopped    Negative: Sounds like a life-threatening emergency to the triager    Protocols used: NOSE INJURY-A-OH    COVID 19 Nurse Triage Plan/Patient Instructions    Please be aware that novel coronavirus (COVID-19) may be circulating in the community. If you develop symptoms such as fever, cough, or SOB or if you have concerns about the presence of another infection including coronavirus (COVID-19), please contact your health care provider or visit  [email or web address].     Disposition/Instructions    In-Person Visit with provider recommended. Reference Visit Selection Guide.    Thank you for taking steps to prevent the spread of this virus.  o Limit your contact with others.  o Wear a simple mask to cover your cough.  o Wash your hands well and often.    Resources    [facility]: About COVID-19: [email or web address]    CDC: What to Do If You're Sick: www.cdc.gov/coronavirus/2019-ncov/about/steps-when-sick.html    CDC: Ending Home Isolation: www.cdc.gov/coronavirus/2019-ncov/hcp/disposition-in-home-patients.html     CDC: Caring for Someone: www.cdc.gov/coronavirus/2019-ncov/if-you-are-sick/care-for-someone.html     [facility]: Interim Guidance for Hospital Discharge to Home: www.[facility].[place]/diseases/coronavirus/hcp/hospdischarge.pdf    [facility] clinical trials (COVID-19 research studies): [email or web address].[facility]-clinical-trials     Below are the COVID-19 hotlines at the [place] [facility] ([facility]). Interpreters are available.   o For health questions: Call 475-532-7498 or 1-723.934.4816 (7 a.m. to 7 p.m.)  o For questions about schools and childcare: Call 725-013-3436 or 1-536.878.4777 (7 a.m. to 7 p.m.)

## 2022-05-24 NOTE — PROGRESS NOTES
"Patient presents with:  Pain: Redness and swelling on Lt side of  nose x over the weekend. Pt stated attempted to poke on nose. \"Very tender\" to touch, low grade headache. Dull pain radiates to Lt cheek. \"Feels like a mountain inside nostril\"      Clinical Decision Making: Left sided nose swelling and redness. DDx cellulitis, cyst, abscess. No fluctuance concerning for abscess. Patient started on Clindamycin for coverage of facial cellulitis. Patient was also referred to plastics in case patient has epidermoid cyst that became inflamed.       ICD-10-CM    1. Facial cellulitis  L03.211 clindamycin (CLEOCIN) 300 MG capsule     Plastic Surgery Referral       Patient Instructions   1) Take antibiotic as prescribed. Take this medication with food to avoid stomach upset.   2) Ibuprofen or Tylenol as needed for pain.  3) Follow up with plastics at earliest capability.   4) Follow up with us at walk in care if no improvement before your trip.   5) I do recommend getting a primary care provider if you don't have one        HPI:  Remedios Osorio is a 66 year old female who presents today complaining of redness and swelling of the left side of nose x 3 days. Nose it TTP. Pain also radiates to the left side of the cheek. Pain is also causing mild HA.     History obtained from the patient.    Problem List:  2019-09: Encounter for long-term (current) use of medications  2016-11: Prediabetes  2016-02: Malignant neoplasm of lower-outer quadrant of left female   breast (H)  2015-09: Prehypertension  2011-05: Allergic rhinitis due to other allergen  2010-05: HYPERLIPIDEMIA LDL GOAL <160  2005-08: Abnormal glucose  2005-06: MORBID OBESITY BMI 44.79 6/05 2005-06: Hyperlipidemia  2005-06: FAMILY HX-HYPOTHYROID   2005-06: Other type of migraine  2005-06: Iron deficiency anemia due to chronic blood loss  2005-06: MENORRHAGIA  2005-06: Family history of ischemic heart disease  2005-06: Family history of diabetes mellitus  2003-02: Leiomyoma " of uterus      Past Medical History:   Diagnosis Date     ABNL LIVER FUNC STUDY  W/ MONO  5/01     Breast cancer (H)      CHR BLOOD LOSS ANEMIA DUE TO FIBROIDS 6/9/2005     ELEVATED HBA1C 6.2% 6/05 8/6/2005     HX MUCOUS POLYPS OF CERVIX  2000     HYPERLIPIDEMIA       Infectious mononucleosis 5/01     MENORRHAGIA      MIGRAINE HEADACHES      MORBID OBESITY BMI 44.79 6/05 6/12/2005     UTERINE LEIOMYOMA  2/7/2003       Social History     Tobacco Use     Smoking status: Never Smoker     Smokeless tobacco: Never Used   Substance Use Topics     Alcohol use: No     Alcohol/week: 0.0 standard drinks     Comment: no alcohol use       Review of Systems   Constitutional: Negative for chills and fever.   Skin: Positive for color change.       Vitals:    05/24/22 1359   BP: (!) 168/96   BP Location: Right arm   Patient Position: Sitting   Cuff Size: Adult Large   Pulse: 97   Resp: 22   Temp: 99.1  F (37.3  C)   TempSrc: Oral   SpO2: 97%   Weight: 116.5 kg (256 lb 12.8 oz)       Physical Exam  Vitals and nursing note reviewed.   Constitutional:       General: She is not in acute distress.     Appearance: She is not toxic-appearing or diaphoretic.   HENT:      Head: Normocephalic and atraumatic.        Comments: Minor swelling noted from inside the left nare. No involvement of the septum.      Right Ear: External ear normal.      Left Ear: External ear normal.   Eyes:      Conjunctiva/sclera: Conjunctivae normal.   Pulmonary:      Effort: Pulmonary effort is normal. No respiratory distress.   Neurological:      Mental Status: She is alert.   Psychiatric:         Mood and Affect: Mood normal.         Behavior: Behavior normal.         Thought Content: Thought content normal.         Judgment: Judgment normal.         At the end of the encounter, I discussed results, diagnosis, medications. Discussed red flags for immediate return to clinic/ER, as well as indications for follow up if no improvement. Patient understood and agreed  to plan. Patient was stable for discharge.

## 2022-06-15 ENCOUNTER — OFFICE VISIT (OUTPATIENT)
Dept: DERMATOLOGY | Facility: CLINIC | Age: 67
End: 2022-06-15
Attending: INTERNAL MEDICINE
Payer: COMMERCIAL

## 2022-06-15 DIAGNOSIS — D18.01 CHERRY ANGIOMA: ICD-10-CM

## 2022-06-15 DIAGNOSIS — L98.9 SKIN LESION: ICD-10-CM

## 2022-06-15 DIAGNOSIS — L82.1 SEBORRHEIC KERATOSIS: ICD-10-CM

## 2022-06-15 DIAGNOSIS — L30.9 HAND DERMATITIS: ICD-10-CM

## 2022-06-15 DIAGNOSIS — L81.4 LENTIGINES: ICD-10-CM

## 2022-06-15 DIAGNOSIS — D22.9 MULTIPLE BENIGN NEVI: Primary | ICD-10-CM

## 2022-06-15 DIAGNOSIS — D48.9 NEOPLASM OF UNCERTAIN BEHAVIOR: ICD-10-CM

## 2022-06-15 PROCEDURE — 88305 TISSUE EXAM BY PATHOLOGIST: CPT | Mod: TC | Performed by: DERMATOLOGY

## 2022-06-15 PROCEDURE — 99204 OFFICE O/P NEW MOD 45 MIN: CPT | Mod: 25 | Performed by: DERMATOLOGY

## 2022-06-15 PROCEDURE — 88305 TISSUE EXAM BY PATHOLOGIST: CPT | Mod: 26 | Performed by: DERMATOLOGY

## 2022-06-15 PROCEDURE — 11102 TANGNTL BX SKIN SINGLE LES: CPT | Performed by: DERMATOLOGY

## 2022-06-15 RX ORDER — FLUOCINONIDE 0.5 MG/G
CREAM TOPICAL 2 TIMES DAILY
Qty: 60 G | Refills: 1 | Status: SHIPPED | OUTPATIENT
Start: 2022-06-15

## 2022-06-15 ASSESSMENT — PAIN SCALES - GENERAL: PAINLEVEL: NO PAIN (1)

## 2022-06-15 NOTE — PROGRESS NOTES
[facility] Dermatology Note  Encounter Date: Mukul 15, 2022  Office Visit     Dermatology Problem List:  # Suspected ISK, L suprapubic region. Significant surrounding inflammation due to recent poultice so will plan to recheck next visit.   # Hand dermatitis.  - Lidex cream  # NUB, L lateral chest, ddx ISK r/o SCC s/p bx 6/15/22  ____________________________________________    Assessment & Plan:    # NUB, L lateral chest, ddx ISK r/o SCC  Suspect benign, however due to bothersome nature and slightly pink rim, recommended biopsy to rule out NMSC.    - Shave biopsy today, see procedure note below     # Suspected ISK, L suprapubic region. However, due to significant surrounding inflammation due to recent poultice so will plan to recheck next visit.     # Hand dermatitis. Chronic, active.  - Moisturizer daily  - Apply LIdex 0.05% cream twice daily as needed    # Multiple benign nevi. Solar lentigines.   - No concerning lesions today  - Monitor for ABCDEs of melanoma   - Continue sun protection - recommend SPF 30 or higher with frequent application   - Return sooner if noticing changing or symptomatic lesions    # Seborrheic keratoses. Cherry angiomas.   Discussed the natural history and benign nature of this lesion. Reassurance provided that no additional treatment is necessary.     Procedures Performed:   - Shave biopsy procedure note, location(s): See above. After discussion of benefits and risks including but not limited to bleeding, infection, scar, incomplete removal, recurrence, and non-diagnostic biopsy, written consent and photographs were obtained. The area was cleaned with isopropyl alcohol. 0.5mL of 1% lidocaine with epinephrine was injected to obtain adequate anesthesia of lesion(s). Shave biopsy at site(s) performed. Hemostasis was achieved with aluminium chloride. Petrolatum ointment and a sterile dressing were applied. The patient tolerated the procedure and no complications were noted.  The patient was provided with verbal and written post care instructions.     Follow-up: 3 month(s) in-person, or earlier for new or changing lesions    Staff and Scribe:     Scribe Disclosure:  I, Selena Nguyễn, am serving as a scribe to document services personally performed by Tiffany Crowe MD based on data collection and the provider's statements to me.     Provider Disclosure:   The documentation recorded by the scribe accurately reflects the services I personally performed and the decisions made by me.    Tiffany Crowe MD    Department of Dermatology  [facility]: Phone: [phone], Fax: [phone]  6/16/2022     ____________________________________________    CC: Derm Problem (2 skin lesions, one on her upper ribcage, also one on her upper leg, oozing and pus on upper leg lesion )    HPI:  Ms. Reemdios Osorio is a(n) 66 year old female who presents today as a new patient for [facility]. Today, the patient points to a lesion on her lower abdomen that she first noticed 6 months ago. She is unsure how long it has been present for though. She is unable to see the lesion. She also points to a lesion on her L side. Another provider thought it might be a wart. She does have a history of breast cancer. No personal or family history of skin cancer. She mows the lawn and gardens, but tries to limit her time outside. Patient is otherwise feeling well, without additional skin concerns.    Labs Reviewed:  N/A    Physical Exam:  Vitals: LMP 10/28/2009   SKIN: Full body skin exam excluding the genitals was performed including face, scalp, neck, ears, chest, back, bilateral arms, hands, bilateral legs, feet, and buttocks.   - L lateral chest, there is a waxy stuck on hyperkeratotic papule with a slightly pink rim.   - L suprapubic region with a waxy stuck on tan to brown papules with surrounding erythema and pustules.   - There  are dome shaped bright red papules on the trunk and extremities.   - Multiple regular brown pigmented macules and papules are identified on the trunk and extremities.   - Scattered brown macules on sun exposed areas.  - There are waxy stuck on tan to brown papules on the trunk and extremities.  - Scaly patches on bilateral 1st and 2nd fingers  - No other lesions of concern on areas examined.     Medications:  Current Outpatient Medications   Medication     Calcium-Magnesium-Vitamin D (CALCIUM MAGNESIUM PO)     letrozole (FEMARA) 2.5 MG tablet     melatonin 3 MG CAPS     Multiple Minerals-Vitamins (CALCIUM CITRATE PLUS/MAGNESIUM PO)     order for DME     VITAMIN D PO     No current facility-administered medications for this visit.     Facility-Administered Medications Ordered in Other Visits   Medication     lidocaine BUFFERED 1 % solution 10 mL      Past Medical History:   Patient Active Problem List   Diagnosis     Leiomyoma of uterus     Iron deficiency anemia due to chronic blood loss     MENORRHAGIA     Family history of ischemic heart disease     Family history of diabetes mellitus     MORBID OBESITY BMI 44.79 6/05     Hyperlipidemia     FAMILY HX-HYPOTHYROID      Other type of migraine     Abnormal glucose     HYPERLIPIDEMIA LDL GOAL <160     Allergic rhinitis due to other allergen     Prehypertension     Malignant neoplasm of lower-outer quadrant of left female breast (H)     Prediabetes     Encounter for long-term (current) use of medications     Past Medical History:   Diagnosis Date     ABNL LIVER FUNC STUDY  W/ MONO  5/01     Breast cancer (H)      CHR BLOOD LOSS ANEMIA DUE TO FIBROIDS 6/9/2005     ELEVATED HBA1C 6.2% 6/05 8/6/2005     HX MUCOUS POLYPS OF CERVIX  2000     HYPERLIPIDEMIA       Infectious mononucleosis 5/01     MENORRHAGIA      MIGRAINE HEADACHES      MORBID OBESITY BMI 44.79 6/05 6/12/2005     UTERINE LEIOMYOMA  2/7/2003        CC Kanchan Patel MD  [address] [place] [address] on close of this encounter.

## 2022-06-15 NOTE — PATIENT INSTRUCTIONS
Dry Skin    What is dry skin?  Common skin problem  Can be worse during the winter   Affects all ages  Occurs in people with or without other skin problems    What does it look like?  Fine lines in the skin become more visible   Rough feeling skin   Flaky skin  Most common on the arms and legs  Skin can become cracked, especially on the hands and feet    What are some problems caused by dry skin?   Itching  Rubbing or scratching can cause thickened, rough skin patches  Cracks in skin can be painful  Red, itchy, scaly skin (called eczema) can occur  Yellow crusting or pus could be signs of an infection    What causes dry skin?  A lack of water in the top layer of the skin  Too much soapy water,  hot water, or harsh chemicals  Aging and sun damage    How do I treat dry skin?  Shower or bathe daily for under ten minutes with lukewarm water and mild soap.  Pat yourself dry with a towel gently and leave your skin slightly damp.  Use moisturizing cream or ointment right away.  Avoid lotions.    What kind of mild soap should I be using?  Camay , Dove , Tone , Neutrogena , Purpose , or Oil of Olay   A non-detergent cleanser, like Cetaphil , can be used.    What should I stay away from?  Scented soaps   Bath oils    What moisturizers should I be using?  Cetaphil Cream,CeraVe Cream, Vanicream, Aquaphilic, Eucerin, Aquaphor, or Vaseline   Always apply after showering or bathing.  Reapply throughout the day, if possible.  If dry skin affects your hands, always reapply after handwashing.    What else should I know?  Using a humidifier during winter months may help.  If dry skin gets worse or if eczema develops, a steroid cream may be needed.     Patient Education     Checking for Skin Cancer  You can find cancer early by checking your skin each month. There are 3 kinds of skin cancer. They are melanoma, basal cell carcinoma, and squamous cell carcinoma. Doing monthly skin checks is the best way to find new marks or skin changes.  Follow the instructions below for checking your skin.   The ABCDEs of checking moles for melanoma   Check your moles or growths for signs of melanoma using ABCDE:   Asymmetry: the sides of the mole or growth don t match  Border: the edges are ragged, notched, or blurred  Color: the color within the mole or growth varies  Diameter: the mole or growth is larger than 6 mm (size of a pencil eraser)  Evolving: the size, shape, or color of the mole or growth is changing (evolving is not shown in the images below)    Checking for other types of skin cancer  Basal cell carcinoma or squamous cell carcinoma have symptoms such as:     A spot or mole that looks different from all other marks on your skin  Changes in how an area feels, such as itching, tenderness, or pain  Changes in the skin's surface, such as oozing, bleeding, or scaliness  A sore that does not heal  New swelling or redness beyond the border of a mole    Who s at risk?  Anyone can get skin cancer. But you are at greater risk if you have:   Fair skin, light-colored hair, or light-colored eyes  Many moles or abnormal moles on your skin  A history of sunburns from sunlight or tanning beds  A family history of skin cancer  A history of exposure to radiation or chemicals  A weakened immune system  If you have had skin cancer in the past, you are at risk for recurring skin cancer.   How to check your skin  Do your monthly skin checkups in front of a full-length mirror. Check all parts of your body, including your:   Head (ears, face, neck, and scalp)  Torso (front, back, and sides)  Arms (tops, undersides, upper, and lower armpits)  Hands (palms, backs, and fingers, including under the nails)  Buttocks and genitals  Legs (front, back, and sides)  Feet (tops, soles, toes, including under the nails, and between toes)  If you have a lot of moles, take digital photos of them each month. Make sure to take photos both up close and from a distance. These can help you see  if any moles change over time.   Most skin changes are not cancer. But if you see any changes in your skin, call your doctor right away. Only he or she can diagnose a problem. If you have skin cancer, seeing your doctor can be the first step toward getting the treatment that could save your life.   Titus last reviewed this educational content on 4/1/2019 2000-2020 [employer] [address] [place] [address]. All rights reserved. This information is not intended as a substitute for professional medical care. Always follow your healthcare professional's instructions.       When should I call my doctor?  If you are worsening or not improving, please, contact us or seek urgent care as noted below.     Who should I call with questions (adults)?  [facility] (adult and pediatric): [phone]  [facility] (adult): [phone]  For urgent needs outside of business hours call the [facility] at [phone] and ask for the dermatology resident on call to be paged  If this is a medical emergency and you are unable to reach an ER, Call [phone]    Who should I call with questions (pediatric)?  [facility]- Pediatric Dermatology  Dr. Jesusita Yanez, Dr. Lucrecia Mckeon, Dr. Tori Paiz, ANGÉLICA Wagner, Dr. Desiree Ruffin, Dr. Izabela Vieyra & Dr. Eric Wong  Non-urgent nurse triage line; [phone]- Maria Esther and Marija TOLEDO Care Coordinatorsinan Royal (/Complex ) [phone]    If you need a prescription refill, please contact your pharmacy. Refills are approved or denied by our Physicians during normal business hours, Monday through Fridays  Per office policy, refills will not be granted if you have not been seen within the past year (or sooner depending on your child's condition)    Scheduling Information:  Pediatric Appointment Scheduling and Call Center [phone]  [phone]  Radiology Scheduling- [phone]  Sedation Unit Scheduling- [phone]  [place] Scheduling- General [phone]; Pediatric Dermatology [phone]  Main  Services: [phone]  Upper sorbian: [phone]  Chadian: [phone]  Hmong/Prosper/Heber: [phone]  Preadmission Nursing Department Fax Number: [phone] (Fax all pre-operative paperwork to this number)    For urgent matters arising during evenings, weekends, or holidays that cannot wait for normal business hours please call [phone] and ask for the dermatology resident on call to be paged.     Wound Care After a Biopsy    What is a skin biopsy?  A skin biopsy allows the doctor to examine a very small piece of tissue under the microscope to determine the diagnosis and the best treatment for the skin condition. A local anesthetic (numbing medicine)  is injected with a very small needle into the skin area to be tested. A small piece of skin is taken from the area. Sometimes a suture (stitch) is used.     What are the risks of a skin biopsy?  I will experience scar, bleeding, swelling, pain, crusting and redness. I may experience incomplete removal or recurrence. Risks of this procedure are excessive bleeding, bruising, infection, nerve damage, numbness, thick (hypertrophic or keloidal) scar and non-diagnostic biopsy.    How should I care for my wound for the first 24 hours?  Keep the wound dry and covered for 24 hours  If it bleeds, hold direct pressure on the area for 15 minutes. If bleeding does not stop then go to the emergency room  Avoid strenuous exercise the first 1-2 days or as your doctor instructs you    How should I care for the wound after 24 hours?  After 24 hours, remove the bandage  You may bathe or shower as normal  If you had a scalp biopsy, you can shampoo as usual and can use shower water to clean the biopsy site daily  Clean the wound twice a day with gentle soap and water  Do not scrub, be gentle  Apply  white petroleum/Vaseline after cleaning the wound with a cotton swab or a clean finger, and keep the site covered with a Bandaid /bandage. Bandages are not necessary with a scalp biopsy  If you are unable to cover the site with a Bandaid /bandage, re-apply ointment 2-3 times a day to keep the site moist. Moisture will help with healing  Avoid strenuous activity for first 1-2 days  Avoid lakes, rivers, pools, and oceans until the stitches are removed or the site is healed    How do I clean my wound?  Wash hands thoroughly with soap or use hand  before all wound care  Clean the wound with gentle soap and water  Apply white petroleum/Vaseline  to wound after it is clean  Replace the Bandaid /bandage to keep the wound covered for the first few days or as instructed by your doctor  If you had a scalp biopsy, warm shower water to the area on a daily basis should suffice    What should I use to clean my wound?   Cotton-tipped applicators (Qtips )  White petroleum jelly (Vaseline ). Use a clean new container and use Q-tips to apply.  Bandaids   as needed  Gentle soap     How should I care for my wound long term?  Do not get your wound dirty  Keep up with wound care for one week or until the area is healed.  A small scab will form and fall off by itself when the area is completely healed. The area will be red and will become pink in color as it heals. Sun protection is very important for how your scar will turn out. Sunscreen with an SPF 30 or greater is recommended once the area is healed.  You should have some soreness but it should be mild and slowly go away over several days. Talk to your doctor about using tylenol for pain,    When should I call my doctor?  If you have increased:   Pain or swelling  Pus or drainage (clear or slightly yellow drainage is ok)  Temperature over 100F  Spreading redness or warmth around wound    When will I hear about my results?  The biopsy results can take 2 weeks to come back.   Your results will automatically release to [employer] before your provider has even reviewed them.  The clinic will call you with the results, send you a [facility] message, or have you schedule a follow-up clinic or phone time to discuss the results.  Contact our clinics if you do not hear from us in 2 weeks.    Who should I call with questions?  [facility]: [phone]  [facility]: [phone]  For urgent needs outside of business hours call the [facility] at [phone] and ask for the dermatology resident on call

## 2022-06-15 NOTE — LETTER
6/15/2022       RE: Remedios Osorio  [address] [address]     Dear Colleague,    Thank you for referring your patient, Remedios Osorio, to the [facility] [place] at [facility]. Please see a copy of my visit note below.    [facility] Dermatology Note  Encounter Date: Mukul 15, 2022  Office Visit     Dermatology Problem List:  # Suspected ISK, L suprapubic region. Significant surrounding inflammation due to recent poultice so will plan to recheck next visit.   # Hand dermatitis.  - Lidex cream  # NUB, L lateral chest, ddx ISK r/o SCC s/p bx 6/15/22  ____________________________________________    Assessment & Plan:    # NUB, L lateral chest, ddx ISK r/o SCC  Suspect benign, however due to bothersome nature and slightly pink rim, recommended biopsy to rule out NMSC.    - Shave biopsy today, see procedure note below     # Suspected ISK, L suprapubic region. However, due to significant surrounding inflammation due to recent poultice so will plan to recheck next visit.     # Hand dermatitis. Chronic, active.  - Moisturizer daily  - Apply LIdex 0.05% cream twice daily as needed    # Multiple benign nevi. Solar lentigines.   - No concerning lesions today  - Monitor for ABCDEs of melanoma   - Continue sun protection - recommend SPF 30 or higher with frequent application   - Return sooner if noticing changing or symptomatic lesions    # Seborrheic keratoses. Cherry angiomas.   Discussed the natural history and benign nature of this lesion. Reassurance provided that no additional treatment is necessary.     Procedures Performed:   - Shave biopsy procedure note, location(s): See above. After discussion of benefits and risks including but not limited to bleeding, infection, scar, incomplete removal, recurrence, and non-diagnostic biopsy, written consent and photographs were obtained. The area was cleaned with  isopropyl alcohol. 0.5mL of 1% lidocaine with epinephrine was injected to obtain adequate anesthesia of lesion(s). Shave biopsy at site(s) performed. Hemostasis was achieved with aluminium chloride. Petrolatum ointment and a sterile dressing were applied. The patient tolerated the procedure and no complications were noted. The patient was provided with verbal and written post care instructions.     Follow-up: 3 month(s) in-person, or earlier for new or changing lesions    Staff and Scribe:     Scribe Disclosure:  I, Selena Adrian, am serving as a scribe to document services personally performed by Tiffany Crowe MD based on data collection and the provider's statements to me.     Provider Disclosure:   The documentation recorded by the scribe accurately reflects the services I personally performed and the decisions made by me.    Tiffany Crowe MD    Department of Dermatology  [facility]: Phone: [phone], Fax: [phone]  6/16/2022     ____________________________________________    CC: Derm Problem (2 skin lesions, one on her upper ribcage, also one on her upper leg, oozing and pus on upper leg lesion )    HPI:  Ms. Remedios Osorio is a(n) 66 year old female who presents today as a new patient for [facility]. Today, the patient points to a lesion on her lower abdomen that she first noticed 6 months ago. She is unsure how long it has been present for though. She is unable to see the lesion. She also points to a lesion on her L side. Another provider thought it might be a wart. She does have a history of breast cancer. No personal or family history of skin cancer. She mows the lawn and gardens, but tries to limit her time outside. Patient is otherwise feeling well, without additional skin concerns.    Labs Reviewed:  N/A    Physical Exam:  Vitals: LMP 10/28/2009   SKIN: Full body skin exam excluding the genitals was  performed including face, scalp, neck, ears, chest, back, bilateral arms, hands, bilateral legs, feet, and buttocks.   - L lateral chest, there is a waxy stuck on hyperkeratotic papule with a slightly pink rim.   - L suprapubic region with a waxy stuck on tan to brown papules with surrounding erythema and pustules.   - There are dome shaped bright red papules on the trunk and extremities.   - Multiple regular brown pigmented macules and papules are identified on the trunk and extremities.   - Scattered brown macules on sun exposed areas.  - There are waxy stuck on tan to brown papules on the trunk and extremities.  - Scaly patches on bilateral 1st and 2nd fingers  - No other lesions of concern on areas examined.     Medications:  Current Outpatient Medications   Medication     Calcium-Magnesium-Vitamin D (CALCIUM MAGNESIUM PO)     letrozole (FEMARA) 2.5 MG tablet     melatonin 3 MG CAPS     Multiple Minerals-Vitamins (CALCIUM CITRATE PLUS/MAGNESIUM PO)     order for DME     VITAMIN D PO     No current facility-administered medications for this visit.     Facility-Administered Medications Ordered in Other Visits   Medication     lidocaine BUFFERED 1 % solution 10 mL      Past Medical History:   Patient Active Problem List   Diagnosis     Leiomyoma of uterus     Iron deficiency anemia due to chronic blood loss     MENORRHAGIA     Family history of ischemic heart disease     Family history of diabetes mellitus     MORBID OBESITY BMI 44.79 6/05     Hyperlipidemia     FAMILY HX-HYPOTHYROID      Other type of migraine     Abnormal glucose     HYPERLIPIDEMIA LDL GOAL <160     Allergic rhinitis due to other allergen     Prehypertension     Malignant neoplasm of lower-outer quadrant of left female breast (H)     Prediabetes     Encounter for long-term (current) use of medications     Past Medical History:   Diagnosis Date     ABNL LIVER [facility] STUDY  W/ MONO  5/01     Breast cancer (H)      CHR BLOOD LOSS ANEMIA DUE TO  FIBROIDS 6/9/2005     ELEVATED HBA1C 6.2% 6/05 8/6/2005     HX MUCOUS POLYPS OF CERVIX  2000     HYPERLIPIDEMIA       Infectious mononucleosis 5/01     MENORRHAGIA      MIGRAINE HEADACHES      MORBID OBESITY BMI 44.79 6/05 6/12/2005     UTERINE LEIOMYOMA  2/7/2003        CC Kanchan Patel MD  [address] [place] [address] on close of this encounter.

## 2022-06-15 NOTE — NURSING NOTE
Lidocaine-epinephrine 1-1:692206 % injection   1mL once for one use, starting 6/15/2022 ending 6/15/2022,  2mL disp, R-0, injection  Injected by Daniella Zhao CMA

## 2022-06-15 NOTE — NURSING NOTE
Dermatology Rooming Note    Remedios Osorio's goals for this visit include:   Chief Complaint   Patient presents with     Derm Problem     2 skin lesions, one on her upper ribcage, also one on her upper leg, oozing and pus on upper leg lesion    Marisa Sanchez, Visit Facilitator

## 2022-06-16 LAB
PATH REPORT.COMMENTS IMP SPEC: NORMAL
PATH REPORT.COMMENTS IMP SPEC: NORMAL
PATH REPORT.FINAL DX SPEC: NORMAL
PATH REPORT.GROSS SPEC: NORMAL
PATH REPORT.MICROSCOPIC SPEC OTHER STN: NORMAL
PATH REPORT.RELEVANT HX SPEC: NORMAL

## 2022-08-22 ENCOUNTER — ONCOLOGY VISIT (OUTPATIENT)
Dept: ONCOLOGY | Facility: CLINIC | Age: 67
End: 2022-08-22
Attending: INTERNAL MEDICINE
Payer: COMMERCIAL

## 2022-08-22 ENCOUNTER — ANCILLARY PROCEDURE (OUTPATIENT)
Dept: MAMMOGRAPHY | Facility: CLINIC | Age: 67
End: 2022-08-22
Payer: COMMERCIAL

## 2022-08-22 ENCOUNTER — APPOINTMENT (OUTPATIENT)
Dept: LAB | Facility: CLINIC | Age: 67
End: 2022-08-22
Attending: INTERNAL MEDICINE
Payer: COMMERCIAL

## 2022-08-22 VITALS
TEMPERATURE: 98.8 F | BODY MASS INDEX: 44.92 KG/M2 | OXYGEN SATURATION: 96 % | RESPIRATION RATE: 18 BRPM | HEART RATE: 90 BPM | SYSTOLIC BLOOD PRESSURE: 151 MMHG | WEIGHT: 251 LBS | DIASTOLIC BLOOD PRESSURE: 91 MMHG

## 2022-08-22 DIAGNOSIS — Z12.31 VISIT FOR SCREENING MAMMOGRAM: ICD-10-CM

## 2022-08-22 DIAGNOSIS — E06.3 HYPOTHYROIDISM DUE TO HASHIMOTO'S THYROIDITIS: ICD-10-CM

## 2022-08-22 DIAGNOSIS — Z92.21 STATUS POST CHEMOTHERAPY: ICD-10-CM

## 2022-08-22 DIAGNOSIS — Z79.811 LONG TERM CURRENT USE OF AROMATASE INHIBITOR: ICD-10-CM

## 2022-08-22 DIAGNOSIS — Z17.0 MALIGNANT NEOPLASM OF LOWER-OUTER QUADRANT OF LEFT BREAST OF FEMALE, ESTROGEN RECEPTOR POSITIVE (H): Primary | ICD-10-CM

## 2022-08-22 DIAGNOSIS — C50.512 MALIGNANT NEOPLASM OF LOWER-OUTER QUADRANT OF LEFT BREAST OF FEMALE, ESTROGEN RECEPTOR POSITIVE (H): Primary | ICD-10-CM

## 2022-08-22 LAB
ALBUMIN SERPL-MCNC: 3.8 G/DL (ref 3.4–5)
ALP SERPL-CCNC: 81 U/L (ref 40–150)
ALT SERPL W P-5'-P-CCNC: 49 U/L (ref 0–50)
ANION GAP SERPL CALCULATED.3IONS-SCNC: 7 MMOL/L (ref 3–14)
AST SERPL W P-5'-P-CCNC: 28 U/L (ref 0–45)
BASOPHILS # BLD AUTO: 0 10E3/UL (ref 0–0.2)
BASOPHILS NFR BLD AUTO: 1 %
BILIRUB SERPL-MCNC: 0.3 MG/DL (ref 0.2–1.3)
BUN SERPL-MCNC: 17 MG/DL (ref 7–30)
CALCIUM SERPL-MCNC: 9.3 MG/DL (ref 8.5–10.1)
CANCER AG15-3 SERPL-ACNC: 11 U/ML
CHLORIDE BLD-SCNC: 104 MMOL/L (ref 94–109)
CO2 SERPL-SCNC: 28 MMOL/L (ref 20–32)
CREAT SERPL-MCNC: 0.72 MG/DL (ref 0.52–1.04)
EOSINOPHIL # BLD AUTO: 0.2 10E3/UL (ref 0–0.7)
EOSINOPHIL NFR BLD AUTO: 3 %
ERYTHROCYTE [DISTWIDTH] IN BLOOD BY AUTOMATED COUNT: 14.2 % (ref 10–15)
GFR SERPL CREATININE-BSD FRML MDRD: >90 ML/MIN/1.73M2
GLUCOSE BLD-MCNC: 102 MG/DL (ref 70–99)
HCT VFR BLD AUTO: 37.2 % (ref 35–47)
HGB BLD-MCNC: 12.3 G/DL (ref 11.7–15.7)
IMM GRANULOCYTES # BLD: 0 10E3/UL
IMM GRANULOCYTES NFR BLD: 0 %
LYMPHOCYTES # BLD AUTO: 1.6 10E3/UL (ref 0.8–5.3)
LYMPHOCYTES NFR BLD AUTO: 28 %
MCH RBC QN AUTO: 30.1 PG (ref 26.5–33)
MCHC RBC AUTO-ENTMCNC: 33.1 G/DL (ref 31.5–36.5)
MCV RBC AUTO: 91 FL (ref 78–100)
MONOCYTES # BLD AUTO: 0.6 10E3/UL (ref 0–1.3)
MONOCYTES NFR BLD AUTO: 10 %
NEUTROPHILS # BLD AUTO: 3.3 10E3/UL (ref 1.6–8.3)
NEUTROPHILS NFR BLD AUTO: 58 %
NRBC # BLD AUTO: 0 10E3/UL
NRBC BLD AUTO-RTO: 0 /100
PLATELET # BLD AUTO: 260 10E3/UL (ref 150–450)
POTASSIUM BLD-SCNC: 4.6 MMOL/L (ref 3.4–5.3)
PROT SERPL-MCNC: 7.8 G/DL (ref 6.8–8.8)
RBC # BLD AUTO: 4.08 10E6/UL (ref 3.8–5.2)
SODIUM SERPL-SCNC: 139 MMOL/L (ref 133–144)
TSH SERPL DL<=0.005 MIU/L-ACNC: 0.92 MU/L (ref 0.4–4)
WBC # BLD AUTO: 5.7 10E3/UL (ref 4–11)

## 2022-08-22 PROCEDURE — 99214 OFFICE O/P EST MOD 30 MIN: CPT | Performed by: INTERNAL MEDICINE

## 2022-08-22 PROCEDURE — 86300 IMMUNOASSAY TUMOR CA 15-3: CPT | Performed by: INTERNAL MEDICINE

## 2022-08-22 PROCEDURE — 84443 ASSAY THYROID STIM HORMONE: CPT | Performed by: INTERNAL MEDICINE

## 2022-08-22 PROCEDURE — 85025 COMPLETE CBC W/AUTO DIFF WBC: CPT | Performed by: INTERNAL MEDICINE

## 2022-08-22 PROCEDURE — 36415 COLL VENOUS BLD VENIPUNCTURE: CPT | Performed by: INTERNAL MEDICINE

## 2022-08-22 PROCEDURE — G0463 HOSPITAL OUTPT CLINIC VISIT: HCPCS

## 2022-08-22 PROCEDURE — 77067 SCR MAMMO BI INCL CAD: CPT | Mod: GC

## 2022-08-22 PROCEDURE — 77063 BREAST TOMOSYNTHESIS BI: CPT | Mod: GC

## 2022-08-22 PROCEDURE — 80053 COMPREHEN METABOLIC PANEL: CPT | Performed by: INTERNAL MEDICINE

## 2022-08-22 ASSESSMENT — PAIN SCALES - GENERAL: PAINLEVEL: NO PAIN (0)

## 2022-08-22 NOTE — NURSING NOTE
"Oncology Rooming Note    August 22, 2022 4:18 PM   Remedios Osorio is a 67 year old female who presents for:    Chief Complaint   Patient presents with     Blood Draw     VPT blood draw by lab RN. Vitals taken and appointment arrived     Oncology Clinic Visit     Breast Ca     Initial Vitals: BP (!) 151/91   Pulse 90   Temp 98.8  F (37.1  C) (Oral)   Resp 18   Wt 113.9 kg (251 lb)   LMP 10/28/2009   SpO2 96%   BMI 44.92 kg/m   Estimated body mass index is 44.92 kg/m  as calculated from the following:    Height as of 8/16/21: 1.592 m (5' 2.68\").    Weight as of this encounter: 113.9 kg (251 lb). Body surface area is 2.24 meters squared.  No Pain (0) Comment: Data Unavailable   Patient's last menstrual period was 10/28/2009.  Allergies reviewed: Yes  Medications reviewed: Yes    Medications: Medication refills not needed today.  Pharmacy name entered into [employer]:    [facility] - [place], [place] - [address].  [id number] - [place], [place] - [address] [facility] [place] [address] [facility] [place] [address] [facility] [place] [address] [facility] [place], [place] [address]  [facility] [place], [place] - [address] [facility] [place], [place] - [address] [facility] - [place], [place] - [address] [facility]    Clinical concerns:        Mary Hennessy CMA              "

## 2022-08-22 NOTE — NURSING NOTE
Chief Complaint   Patient presents with     Blood Draw     VPT blood draw by lab RN. Vitals taken and appointment arrived     Ally Baron RN

## 2022-08-22 NOTE — LETTER
8/22/2022         RE: Remedios Osorio  [address]  [facility] [address]        Dear Colleague,    Thank you for referring your patient, Remedios Osorio, to the [facility]. Please see a copy of my visit note below.    MEDICAL ONCOLOGY FOLLOW-UP PATIENT VISIT:    NAME: Remedios Osorio     DATE: 8/22/2022    PRIMARY CARE PHYSICIAN: Kae Caal    PATIENT ID: Clinical Stage II-B Breast Cancer    Oncology History: Remedios Osorio is a 67 year old female with h/o stage IIIa, S8mJ2eV0, ER positive, AL positive, HER2 non-amplified invasive ductal carcinoma of the left breast. Her PCP palpated a mass in the lower outer left breast in 01/2016. Diagnostic mammogram and ultrasound showed a 3.3 cm mass at 4:30, 8 cm from the nipple. She was also found to have multiple abnormal left axillary lymph nodes. The largest one was 2.4 cm. Biopsy of her left breast mass demonstrated invasive ductal carcinoma, grade 3 with extensive tumor necrosis. Axillary LN biopsy was also stim and the other is called GRA forte.  Positive for malignancy. The tumor cells were strongly positive for estrogen receptor (> 95%) and moderate to strongly positive for progesterone receptor (60-70%). HER2/lizz was non-amplified by FISH.  PET/CT showed the left breast mass, 5 hypermetabolic left axillary lymph nodes, indeterminate left cervical chain lymph nodes, and an indeterminate hypodensity in the dome of the liver. She received 11 cycles of weekly Taxol, the 12th was cancelled due to neuropathy. She received one cycle of ddAC but did not tolerate it due to fatigue and generalized weakness. She declined further chemotherapy.    Left breast lumpectomy and left axillary lymph node dissection was performed by Dr. Amezcua on 6/29/16.  Pathology showed a residual 1.6 cm grade 2, IDC in the left breast.  Surgical margins were negative.  Invasive tumor cellularity of 30%.  Tumor infiltrating lymphocytes were estimated  "at 50%.  6/19 excised lymph nodes were involved with malignancy.  The largest lymph node metastasis measured 3.9 cm and had a 1 mm area of extranodal extension.  Minimal treatment related changes were noted in the lymph nodes.  Pathologic staging was W3hH5H7, or stage IIIa.  [facility] residual cancer burden was Class III.  Adjuvant Xeloda was recommended, she declined.  She completed radiation on 10/17/16.  She declined zometa for prevention of bone metastases.  She has been on letrozole since early 09/2016.    Interim History:   Ms. Osorio comes into clinic today for routine breast cancer follow-up.  She is on treatment with letrozole.  Since our last visit, she has started two additional supplements for her thyroid, Marcelo- stim and GRA-forte.  These were prescribed to her by her sister, who is a homeopath.  Since starting these medications, [facility] shows that her thyroid function is returning to normal.  She has also felt much better.  She states at the peak of her thyroid dysfunction, she was unable to walk a half lap in the [place].  Now she can walk without issues.  In fact, her sister Cecilia recently visited her in July and had her walking every night of the week.  In the last couple months she has lost 7 pounds.  She is very proud of this.  She is watching sugar intake in the diet.  She has not made other dietary changes at this time.      She denies concerning lumps, pain, or swelling of either breast.  With activity such as when she mows the lawn, she will have intermittent left chest wall swelling.  She has no current cough, shortness of breath, or chest pain.  She has no abdominal complaints.  She states that she had COVID \"early on\", as did the rest of her family.  She feels she is now \"good with COVID\".  She has not had any of the vaccinations.  Remainder of a complete 12 point review of systems was reviewed with patient is negative with exception " that mentioned above.    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       PAST SURGICAL HISTORY:  Past Surgical History:   Procedure Laterality Date     CL AFF SURGICAL PATHOLOGY  2005    UTERINE LEIOMYOMA  [D25.9]     HC BIOPSY/EXCIS CERVICAL LESION W/WO FULGURATION  08-15-00,12-    endocervical polypectomy     HC TOOTH EXTRACTION W/FORCEP      wisdom teeth     LUMPECTOMY BREAST WITH SENTINEL NODE, COMBINED Left 6/29/2016    Segmental mastectomy with axillary lymph node dissection     REMOVE PORT VASCULAR ACCESS Right 6/29/2016    Procedure: REMOVE PORT VASCULAR ACCESS;  Surgeon: Gianna Amezcua MD;  Location:  OR     MEDS:  Current Outpatient Medications   Medication     Calcium-Magnesium-Vitamin D (CALCIUM MAGNESIUM PO)     fluocinonide (LIDEX) 0.05 % external cream     letrozole (FEMARA) 2.5 MG tablet     melatonin 3 MG CAPS     Multiple Minerals-Vitamins (CALCIUM CITRATE PLUS/MAGNESIUM PO)     order for DME     VITAMIN D PO     No current facility-administered medications for this visit.     Facility-Administered Medications Ordered in Other Visits   Medication     lidocaine BUFFERED 1 % solution 10 mL       ALLERGIES:  Allergies   Allergen Reactions     Benadryl [Diphenhydramine] Other (See Comments)     Pt had severe hypotension, nausea and vasovagal type reaction to IV Benadryl.   Give PO only.      Cats      Keflex [Cephalexin]      rash        PHYSICAL EXAM:  BP (!) 151/91   Pulse 90   Temp 98.8  F (37.1  C) (Oral)   Resp 18   Wt 113.9 kg (251 lb)   LMP 10/28/2009   SpO2 96%   BMI 44.92 kg/m    General:  Obese  adult female in NAD.  Alert and oriented.  HEENT:  Normocephalic.  Sclera anicteric.  MMM.  No lesions of the oropharynx.  Lymph:  No palpable cervical, supraclavicular, or axillary LAD.  Chest:  CTA bilaterally.  No wheezes or crackles.  CV:  Tachycardic.  RR.  Nl S1 and S2.  No m/r/g.  Breast:  Bilateral breasts are large and of normal fibroglandular density.  There are no discretely palpable masses in either breast.  Abd:  Soft/ND.    Ext:  No pitting edema of the bilateral lower extremities.    Musculo:  Full ROM of the bilateral upper extremities.  Neuro:  Cranial nerves grossly intact.  Psych:  Mood and affect appear normal.  Skin:  No visible concerning skin rashes or lesions.    LABS REVIEWED THIS VISIT:  8/22/2022 Labs:  Electrolytes, kidney, and liver function tests are all wnl.  TSH is wnl.  Blood counts are wnl.    8/22/2022 Bilateral screening mammograms:  We reviewed the images together in clinic today.  Breasts with scattered fibroglandular densities.  There are no new or concerning findings when compared to mammogram one year prior.  Radiology read is pending.    IMPRESSION/PLAN: Ms. Osorio is a 67 year old female with a h/o stage IIIa, Q6cF4T3, ER/UT positive, HER2 negative left breast cancer. She is s/p 11 weeks of weekly taxol, 1 cycle of ddAC, left breast lumpectomy, ALND, and adjuvant radiation.  She has been on letrozole since early September, 2016.      1.  Stage III breast cancer:  Remedios is 6 years, 2 months out from excision of a left breast cancer.  She is on treatment with letrozole.  She is tolerating the medication well.  We plan to continue letrozole to complete a total 10 years of endocrine therapy (through 09/2026).      She is asymptomatic of disease recurrence on history today and clinical exam is without concerning findings.  I personally reviewed the images of bilateral screening mammograms performed today which are without significant change when compared to one year prior.  I  will see her back in 6 months for her next visit.     2.  Bone Health:  DEXA in 02/2021 with a lowest T-score of -1.2 in the distal radius c/w osteopenia.  She is s/p treatment with 2 years Zometa from 09/2019 - 10/2021, she did not tolerate the medication with significant joint pains following each dose.  Recommend she take calcium 1200 mg, vitamin D 1000 international unit(s), and 20-30 minutes of daily weight bearing activity.  Will repeat a DEXA in 02/2023.      3.  Morbid obesity:  Patients with increased BMI have worse breast cancer outcomes and losing weight can improve these outcomes.  I am happy to see that she has lost 7 pounds in the last 3 months.  I encouraged her to continue to go for walks daily and reduce carbohydrate intake in the diet. She has previously declined referral to the weight management clinic.    4.  Hypothyroidism:  She declines treatment with levothyroxine and instead has been taking Marcelo-Stim and GRA-Forte supplements advised by her sister who is a homeopath.  TSH checked today is wnl.  She also has less symptoms of hypothyroidism than at prior visits.    5.  Follow Up:  Return to clinic in approximately 6 months for labs, DEXA bone density scan and visit with me.      30 minutes spent on the date of the encounter doing chart review, review of test results, interpretation of tests, patient visit and documentation       Sincerely,    Kanchan Patel MD

## 2022-10-05 ENCOUNTER — OFFICE VISIT (OUTPATIENT)
Dept: DERMATOLOGY | Facility: CLINIC | Age: 67
End: 2022-10-05
Payer: COMMERCIAL

## 2022-10-05 DIAGNOSIS — M79.672 LEFT FOOT PAIN: Primary | ICD-10-CM

## 2022-10-05 DIAGNOSIS — L82.1 SEBORRHEIC KERATOSES: ICD-10-CM

## 2022-10-05 DIAGNOSIS — L30.9 CHRONIC DERMATITIS OF HANDS: ICD-10-CM

## 2022-10-05 PROCEDURE — 99213 OFFICE O/P EST LOW 20 MIN: CPT | Mod: GC | Performed by: DERMATOLOGY

## 2022-10-05 ASSESSMENT — PAIN SCALES - GENERAL: PAINLEVEL: NO PAIN (0)

## 2022-10-05 NOTE — LETTER
10/5/2022       RE: Remedios Osorio  [address]  [facility] [address]     Dear Colleague,    Thank you for referring your patient, Remedios Osorio, to the [facility] [place] at [facility]. Please see a copy of my visit note below.    [facility] Dermatology Note  Encounter Date: Oct 5, 2022  Office Visit     Dermatology Problem List:  # Hand dermatitis.  - Lidex cream  # Benign bx:  - Verrucoid keratosis, L lateral chest, s/p shave bx 6/15/22  ____________________________________________    Assessment & Plan:    # SK, left suprapubic region.  Exam clearly consistent with SK (prev exam was altered by recent poultice use). Reassurance provided, no treatment required.    # Verrucoid keratosis, L lateral chest, s/p shave bx 6/15/22  - No recurrence today, advised could recur in future  - Benign nature discussed    # Foot pain - notes swelling and pain on top of foot.   - orthopedic surgery referral     # Hand dermatitis - improved on Lidex.  - Continue daily moisturizer  - Continue lidex prn    Procedures Performed:   None     Follow-up: 1-2 years for FBSE, sooner if concerns.     Staff and Resident:    Rina Robb MD     The patient was seen and staffed with Dr. Amrita MD     Staff Physician Comments:   I saw and evaluated the patient with the resident and I agree with the assessment and plan.  I was present for the examination.    Tiffany Crowe MD    Department of Dermatology  [facility]: Phone: [phone], Fax: [phone]  10/5/2022       ____________________________________________    CC: Skin Check (3 month follow up on skin lesion.  Left flank and left groin.)    HPI:  Ms. Remedios Osorio is a(n) 67 year old female who presents today as a return patient for spot recheck. She has two spots frozen last  visit thought to be ISKs. They are improved and the one on the left flank is basically resolved. The suprapubic one is flatter. She has been doing better from hand dermatitis perspective with lidex ointment. Denies any other new, changing, bleeding, or nonhealing lesions.      Labs Reviewed:  N/A    Physical Exam:  Vitals: LMP 10/28/2009   SKIN: Focused exam of the left flank, hands, and suprapubic area   - Well healed scar left lateral chest  - left suprapubic region waxy stuck on papule  - Hands xerotic   - No other lesions of concern on areas examined.     Medications:  Current Outpatient Medications   Medication     Calcium-Magnesium-Vitamin D (CALCIUM MAGNESIUM PO)     fluocinonide (LIDEX) 0.05 % external cream     letrozole (FEMARA) 2.5 MG tablet     melatonin 3 MG CAPS     Multiple Minerals-Vitamins (CALCIUM CITRATE PLUS/MAGNESIUM PO)     order for DME     UNABLE TO FIND     UNABLE TO FIND     VITAMIN D PO     No current facility-administered medications for this visit.     Facility-Administered Medications Ordered in Other Visits   Medication     lidocaine BUFFERED 1 % solution 10 mL      Past Medical History:   Patient Active Problem List   Diagnosis     Leiomyoma of uterus     Iron deficiency anemia due to chronic blood loss     MENORRHAGIA     Family history of ischemic heart disease     Family history of diabetes mellitus     MORBID OBESITY BMI 44.79 6/05     Hyperlipidemia     FAMILY HX-HYPOTHYROID      Other type of migraine     Abnormal glucose     HYPERLIPIDEMIA LDL GOAL <160     Allergic rhinitis due to other allergen     Prehypertension     Malignant neoplasm of lower-outer quadrant of left female breast (H)     Prediabetes     Encounter for long-term (current) use of medications     Past Medical History:   Diagnosis Date     ABNL LIVER FUN STUDY  W/ MONO  5/01     Breast cancer (H)      CHR BLOOD LOSS ANEMIA DUE TO FIBROIDS 6/9/2005     ELEVATED HBA1C 6.2% 6/05 8/6/2005     HX MUCOUS POLYPS OF  CERVIX  2000     HYPERLIPIDEMIA       Infectious mononucleosis 5/01     MENORRHAGIA      MIGRAINE HEADACHES      MORBID OBESITY BMI 44.79 6/05 6/12/2005     UTERINE LEIOMYOMA  2/7/2003        CC Kanchan Patel MD  [address] [place] [address] on close of this encounter.

## 2022-10-05 NOTE — PROGRESS NOTES
[facility] Dermatology Note  Encounter Date: Oct 5, 2022  Office Visit     Dermatology Problem List:  # Hand dermatitis.  - Lidex cream  # Benign bx:  - Verrucoid keratosis, L lateral chest, s/p shave bx 6/15/22  ____________________________________________    Assessment & Plan:    # SK, left suprapubic region.  Exam clearly consistent with SK (prev exam was altered by recent poultice use). Reassurance provided, no treatment required.    # Verrucoid keratosis, L lateral chest, s/p shave bx 6/15/22  - No recurrence today, advised could recur in future  - Benign nature discussed    # Foot pain - notes swelling and pain on top of foot.   - orthopedic surgery referral     # Hand dermatitis - improved on Lidex.  - Continue daily moisturizer  - Continue lidex prn    Procedures Performed:   None     Follow-up: 1-2 years for FBSE, sooner if concerns.     Staff and Resident:    Rina Robb MD     The patient was seen and staffed with Dr. Amrita MD     Staff Physician Comments:   I saw and evaluated the patient with the resident and I agree with the assessment and plan.  I was present for the examination.    Tiffany Crowe MD    Department of Dermatology  [facility]: Phone: [phone], Fax: [phone]  10/5/2022       ____________________________________________    CC: Skin Check (3 month follow up on skin lesion.  Left flank and left groin.)    HPI:  Ms. Remedios Osorio is a(n) 67 year old female who presents today as a return patient for spot recheck. She has two spots frozen last visit thought to be ISKs. They are improved and the one on the left flank is basically resolved. The suprapubic one is flatter. She has been doing better from hand dermatitis perspective with lidex ointment. Denies any other new, changing, bleeding, or nonhealing lesions.      Labs Reviewed:  N/A    Physical  Exam:  Vitals: LMP 10/28/2009   SKIN: Focused exam of the left flank, hands, and suprapubic area   - Well healed scar left lateral chest  - left suprapubic region waxy stuck on papule  - Hands xerotic   - No other lesions of concern on areas examined.     Medications:  Current Outpatient Medications   Medication     Calcium-Magnesium-Vitamin D (CALCIUM MAGNESIUM PO)     fluocinonide (LIDEX) 0.05 % external cream     letrozole (FEMARA) 2.5 MG tablet     melatonin 3 MG CAPS     Multiple Minerals-Vitamins (CALCIUM CITRATE PLUS/MAGNESIUM PO)     order for DME     UNABLE TO FIND     UNABLE TO FIND     VITAMIN D PO     No current facility-administered medications for this visit.     Facility-Administered Medications Ordered in Other Visits   Medication     lidocaine BUFFERED 1 % solution 10 mL      Past Medical History:   Patient Active Problem List   Diagnosis     Leiomyoma of uterus     Iron deficiency anemia due to chronic blood loss     MENORRHAGIA     Family history of ischemic heart disease     Family history of diabetes mellitus     MORBID OBESITY BMI 44.79 6/05     Hyperlipidemia     FAMILY HX-HYPOTHYROID      Other type of migraine     Abnormal glucose     HYPERLIPIDEMIA LDL GOAL <160     Allergic rhinitis due to other allergen     Prehypertension     Malignant neoplasm of lower-outer quadrant of left female breast (H)     Prediabetes     Encounter for long-term (current) use of medications     Past Medical History:   Diagnosis Date     ABNL LIVER FUNC STUDY  W/ MONO  5/01     Breast cancer (H)      CHR BLOOD LOSS ANEMIA DUE TO FIBROIDS 6/9/2005     ELEVATED HBA1C 6.2% 6/05 8/6/2005     HX MUCOUS POLYPS OF CERVIX  2000     HYPERLIPIDEMIA       Infectious mononucleosis 5/01     MENORRHAGIA      MIGRAINE HEADACHES      MORBID OBESITY BMI 44.79 6/05 6/12/2005     UTERINE LEIOMYOMA  2/7/2003        CC Kanchan Patel MD  [facility] [place] [address] on close of this encounter.

## 2022-10-05 NOTE — NURSING NOTE
Dermatology Rooming Note    Remedios Osorio's goals for this visit include:   Chief Complaint   Patient presents with     Skin Check     3 month follow up on skin lesion.  Left flank and left groin.     Essie Ferraro CMA

## 2022-10-05 NOTE — PROGRESS NOTES
[facility] Dermatology Note  Encounter Date: Oct 5, 2022  Office Visit     Dermatology Problem List:  1. Suspected ISK, L suprapubic region. Significant surrounding inflammation due to recent poultice so will plan to recheck next visit.   2. Hand dermatitis.  - Lidex cream  3. Verrucoid keratosis s/p bx 6/15/22  ____________________________________________    Assessment & Plan:     # Suspected ISK, L suprapubic region. However, due to significant surrounding inflammation due to recent poultice so will plan to recheck next visit.      # Hand dermatitis. Chronic, active.  - Moisturizer daily  - Apply LIdex 0.05% cream twice daily as needed     # Multiple benign nevi. Solar lentigines.   - No concerning lesions today  - Monitor for ABCDEs of melanoma   - Continue sun protection - recommend SPF 30 or higher with frequent application   - Return sooner if noticing changing or symptomatic lesions     # Seborrheic keratoses. Cherry angiomas.   Discussed the natural history and benign nature of this lesion. Reassurance provided that no additional treatment is necessary.     # {Diagnosesderm:185304}.   {kkplans:561208}   - ***     # {Diagnosesderm:558284}.   {kkplans:212493}   - ***     Procedures Performed:   {kkprocedurenotes:294921}  {kkprocedurenotes:901474}    Follow-up: {kkfollowup:933439}    Staff and Scribe:     Scribe Disclosure:  I, Lonny Vicente, am serving as a scribe to document services personally performed by Tiffany Crowe MD based on data collection and the provider's statements to me.     ***  ____________________________________________    CC: No chief complaint on file.    HPI:  Ms. Remedios Osorio is a(n) 67 year old female who presents today as a return patient for ***. She was last seen by myself on 6/15/22 and a biopsy was performed at that time of left shoulder which returned as a verrucoid keratosis.       Patient is otherwise feeling well, without additional skin  concerns.    Labs Reviewed:  ***N/A    Physical Exam:  Vitals: LMP 10/28/2009   SKIN: {kkSkinExam:378351}  - ***  - {Skin Exam Derm:884349}.   - {Skin Exam Derm:871099}.   - {Skin Exam Derm:792389}.   - No other lesions of concern on areas examined.     Medications:  Current Outpatient Medications   Medication     Calcium-Magnesium-Vitamin D (CALCIUM MAGNESIUM PO)     fluocinonide (LIDEX) 0.05 % external cream     letrozole (FEMARA) 2.5 MG tablet     melatonin 3 MG CAPS     Multiple Minerals-Vitamins (CALCIUM CITRATE PLUS/MAGNESIUM PO)     order for DME     UNABLE TO FIND     UNABLE TO FIND     VITAMIN D PO     No current facility-administered medications for this visit.     Facility-Administered Medications Ordered in Other Visits   Medication     lidocaine BUFFERED 1 % solution 10 mL      Past Medical History:   Patient Active Problem List   Diagnosis     Leiomyoma of uterus     Iron deficiency anemia due to chronic blood loss     MENORRHAGIA     Family history of ischemic heart disease     Family history of diabetes mellitus     MORBID OBESITY BMI 44.79 6/05     Hyperlipidemia     FAMILY HX-HYPOTHYROID      Other type of migraine     Abnormal glucose     HYPERLIPIDEMIA LDL GOAL <160     Allergic rhinitis due to other allergen     Prehypertension     Malignant neoplasm of lower-outer quadrant of left female breast (H)     Prediabetes     Encounter for long-term (current) use of medications     Past Medical History:   Diagnosis Date     ABNL LIVER FUNC STUDY  W/ MONO  5/01     Breast cancer (H)      CHR BLOOD LOSS ANEMIA DUE TO FIBROIDS 6/9/2005     ELEVATED HBA1C 6.2% 6/05 8/6/2005     HX MUCOUS POLYPS OF CERVIX  2000     HYPERLIPIDEMIA       Infectious mononucleosis 5/01     MENORRHAGIA      MIGRAINE HEADACHES      MORBID OBESITY BMI 44.79 6/05 6/12/2005     UTERINE LEIOMYOMA  2/7/2003        CC Tiffany Crowe MD   DERMATOLOGY  [address] [place], [place] [id number], [place] [address] on close of this encounter.

## 2022-10-19 NOTE — TELEPHONE ENCOUNTER
DIAGNOSIS: Left foot pain /Dr Crowe/ no images   APPOINTMENT DATE: 11.16.22   NOTES STATUS DETAILS   OFFICE NOTE from referring provider Internal 10.5.22 Dr Tiffany Crowe, [facility] Derm   MEDICATION LIST Internal    LABS     CBC/DIFF Internal

## 2022-11-16 ENCOUNTER — ANCILLARY PROCEDURE (OUTPATIENT)
Dept: GENERAL RADIOLOGY | Facility: CLINIC | Age: 67
End: 2022-11-16
Attending: PODIATRIST
Payer: COMMERCIAL

## 2022-11-16 ENCOUNTER — OFFICE VISIT (OUTPATIENT)
Dept: ORTHOPEDICS | Facility: CLINIC | Age: 67
End: 2022-11-16
Payer: COMMERCIAL

## 2022-11-16 ENCOUNTER — PRE VISIT (OUTPATIENT)
Dept: ORTHOPEDICS | Facility: CLINIC | Age: 67
End: 2022-11-16

## 2022-11-16 DIAGNOSIS — M19.072 ARTHRITIS OF MIDTARSAL JOINT OF LEFT FOOT: ICD-10-CM

## 2022-11-16 DIAGNOSIS — M79.671 RIGHT FOOT PAIN: ICD-10-CM

## 2022-11-16 DIAGNOSIS — M79.672 LEFT FOOT PAIN: ICD-10-CM

## 2022-11-16 DIAGNOSIS — M19.071 ARTHRITIS OF MIDTARSAL JOINT OF RIGHT FOOT: ICD-10-CM

## 2022-11-16 DIAGNOSIS — M19.072 ARTHRITIS OF MIDTARSAL JOINT OF LEFT FOOT: Primary | ICD-10-CM

## 2022-11-16 PROCEDURE — 73630 X-RAY EXAM OF FOOT: CPT | Mod: RT | Performed by: RADIOLOGY

## 2022-11-16 PROCEDURE — 99203 OFFICE O/P NEW LOW 30 MIN: CPT | Performed by: PODIATRIST

## 2022-11-16 ASSESSMENT — ENCOUNTER SYMPTOMS
ARTHRALGIAS: 0
STIFFNESS: 1

## 2022-11-16 NOTE — PROGRESS NOTES
Date of Service: 11/16/2022    Chief Complaint:   Chief Complaint   Patient presents with     Consult        HPI: Remedios is a 67 year old female who presents today for further evaluation of bilateral foot pain.  Nature: Sharp and dull    Location: Tops of both feet    Duration: Years    Onset: Gradual.  No trauma.    Course: Worsening    Aggravating/alleviating factors: Shoes pushing on the area is aggravating.  Rest alleviates.    Previous Treatments: Change in shoes.      Review of Systems: Answers for HPI/ROS submitted by the patient on 11/16/2022  General Symptoms: No  Skin Symptoms: No  HENT Symptoms: No  EYE SYMPTOMS: No  HEART SYMPTOMS: No  LUNG SYMPTOMS: No  INTESTINAL SYMPTOMS: No  URINARY SYMPTOMS: No  GYNECOLOGIC SYMPTOMS: No  BREAST SYMPTOMS: No  SKELETAL SYMPTOMS: Yes  BLOOD SYMPTOMS: No  NERVOUS SYSTEM SYMPTOMS: No  MENTAL HEALTH SYMPTOMS: No  Joint pain: No  Joint stiffness: Yes        PMH:   Past Medical History:   Diagnosis Date     ABNL LIVER FUNC STUDY  W/ MONO  5/01     Breast cancer (H)      CHR BLOOD LOSS ANEMIA DUE TO FIBROIDS 6/9/2005     ELEVATED HBA1C 6.2% 6/05 8/6/2005     HX MUCOUS POLYPS OF CERVIX  2000     HYPERLIPIDEMIA       Infectious mononucleosis 5/01     MENORRHAGIA      MIGRAINE HEADACHES      MORBID OBESITY BMI 44.79 6/05 6/12/2005     UTERINE LEIOMYOMA  2/7/2003       PSxH:   Past Surgical History:   Procedure Laterality Date     CL AFF SURGICAL PATHOLOGY  2005    UTERINE LEIOMYOMA  [D25.9]     HC BIOPSY/EXCIS CERVICAL LESION W/WO FULGURATION  08-15-00,12-    endocervical polypectomy     HC TOOTH EXTRACTION W/FORCEP      wisdom teeth     LUMPECTOMY BREAST WITH SENTINEL NODE, COMBINED Left 6/29/2016    Segmental mastectomy with axillary lymph node dissection     REMOVE PORT VASCULAR ACCESS Right 6/29/2016    Procedure: REMOVE PORT VASCULAR ACCESS;  Surgeon: Gianna Amezcua MD;  Location: [facility] OR       Allergies: Benadryl [diphenhydramine], Cats, and Keflex  [cephalexin]    SH:   Social History     Socioeconomic History     Marital status: Single     Spouse name: Not on file     Number of children: 0     Years of education: Not on file     Highest education level: Not on file   Occupational History     Not on file   Tobacco Use     Smoking status: Never     Smokeless tobacco: Never   Substance and Sexual Activity     Alcohol use: No     Alcohol/week: 0.0 standard drinks     Comment: no alcohol use     Drug use: No     Sexual activity: Not Currently   Other Topics Concern     Parent/sibling w/ CABG, MI or angioplasty before 65F 55M? Not Asked   Social History Narrative     Not on file     Social Determinants of Health     Financial Resource Strain: Not on file   Food Insecurity: Not on file   Transportation Needs: Not on file   Physical Activity: Not on file   Stress: Not on file   Social Connections: Not on file   Intimate Partner Violence: Not on file   Housing Stability: Not on file       FH:   Family History   Problem Relation Age of Onset     C.A.D. Father         [id number]     Diabetes Father         Type 2     Prostate Cancer Father      Alcohol/Drug Father      Hypertension Father      Depression Father      Eye Disorder Father         cataracts bilat     Lipids Father      Thyroid Disease Father         hypo     Hypertension Mother      Alzheimer Disease Mother         DX ~75     Eye Disorder Mother         cataract one eye      Osteoporosis Mother         early stages     Thyroid Disease Mother         hypo     Genitourinary Problems Sister         kidney stones     Lipids Sister        Objective:  Data Unavailable Data Unavailable Data Unavailable Data Unavailable Data Unavailable 0 lbs 0 oz    PT and DP pulses are 2/4 bilaterally. CRT is instant.  Positive pedal hair.   Gross sensation is intact bilaterally.   Equinus is noted bilaterally.  Dorsal osteophytes noted to bilateral mid feet at about the areas of the base of the metatarsals.  These do cause pain  with palpation.  No pain with midtarsal joint range of motion.  No pain with first or fifth ray range of motion.  No ankle pain.  Nails normal bilaterally. No open lesions are noted.     bilateral foot xrays indicated in 6 weightbearing views.    Severe degenerative changes noted to the 2nd ad 3rd tatsometatarsal joints. No acute processes noted.       Assessment:   Encounter Diagnoses   Name Primary?     Arthritis of midtarsal joint of left foot Yes     Left foot pain      Arthritis of midtarsal joint of right foot      Right foot pain          Plan:  - Pt seen and evaluated.  -X-rays taken and discussed with her.  - I discussed with her that the treatment options are limited, 2/2 extent of the degenerative changes. Recommend topical Voltaren for symptom control. In order to get rid of the bone spurring, she would need exostectomies and likely joint arthrodesis.   - Voltaren was sent to the pharmacy.  - See again PRN.

## 2022-11-16 NOTE — LETTER
11/16/2022         RE: Remedois Osorio  [address]  [place] [address]        Dear Colleague,    Thank you for referring your patient, Remedios Osorio, to the [facility]. Please see a copy of my visit note below.    Date of Service: 11/16/2022    Chief Complaint:   Chief Complaint   Patient presents with     Consult        HPI: Remedios is a 67 year old female who presents today for further evaluation of bilateral foot pain.  Nature: Sharp and dull    Location: Tops of both feet    Duration: Years    Onset: Gradual.  No trauma.    Course: Worsening    Aggravating/alleviating factors: Shoes pushing on the area is aggravating.  Rest alleviates.    Previous Treatments: Change in shoes.      Review of Systems: Answers for HPI/ROS submitted by the patient on 11/16/2022  General Symptoms: No  Skin Symptoms: No  HENT Symptoms: No  EYE SYMPTOMS: No  HEART SYMPTOMS: No  LUNG SYMPTOMS: No  INTESTINAL SYMPTOMS: No  URINARY SYMPTOMS: No  GYNECOLOGIC SYMPTOMS: No  BREAST SYMPTOMS: No  SKELETAL SYMPTOMS: Yes  BLOOD SYMPTOMS: No  NERVOUS SYSTEM SYMPTOMS: No  MENTAL HEALTH SYMPTOMS: No  Joint pain: No  Joint stiffness: Yes        PMH:   Past Medical History:   Diagnosis Date     ABNL LIVER FUNC STUDY  W/ MONO  5/01     Breast cancer (H)      CHR BLOOD LOSS ANEMIA DUE TO FIBROIDS 6/9/2005     ELEVATED HBA1C 6.2% 6/05 8/6/2005     HX MUCOUS POLYPS OF CERVIX  2000     HYPERLIPIDEMIA       Infectious mononucleosis 5/01     MENORRHAGIA      MIGRAINE HEADACHES      MORBID OBESITY BMI 44.79 6/05 6/12/2005     UTERINE LEIOMYOMA  2/7/2003       PSxH:   Past Surgical History:   Procedure Laterality Date     CL AFF SURGICAL PATHOLOGY  2005    UTERINE LEIOMYOMA  [D25.9]     HC BIOPSY/EXCIS CERVICAL LESION W/WO FULGURATION  08-15-00,12-    endocervical polypectomy     HC TOOTH EXTRACTION W/FORCEP      wisdom teeth     LUMPECTOMY BREAST WITH SENTINEL NODE, COMBINED Left 6/29/2016    Segmental  mastectomy with axillary lymph node dissection     REMOVE PORT VASCULAR ACCESS Right 6/29/2016    Procedure: REMOVE PORT VASCULAR ACCESS;  Surgeon: Gianna Amezcua MD;  Location:  OR       Allergies: Benadryl [diphenhydramine], Cats, and Keflex [cephalexin]    SH:   Social History     Socioeconomic History     Marital status: Single     Spouse name: Not on file     Number of children: 0     Years of education: Not on file     Highest education level: Not on file   Occupational History     Not on file   Tobacco Use     Smoking status: Never     Smokeless tobacco: Never   Substance and Sexual Activity     Alcohol use: No     Alcohol/week: 0.0 standard drinks     Comment: no alcohol use     Drug use: No     Sexual activity: Not Currently   Other Topics Concern     Parent/sibling w/ CABG, MI or angioplasty before 65F 55M? Not Asked   Social History Narrative     Not on file     Social Determinants of Health     Financial Resource Strain: Not on file   Food Insecurity: Not on file   Transportation Needs: Not on file   Physical Activity: Not on file   Stress: Not on file   Social Connections: Not on file   Intimate Partner Violence: Not on file   Housing Stability: Not on file       FH:   Family History   Problem Relation Age of Onset     C.A.D. Father         [id number]     Diabetes Father         Type 2     Prostate Cancer Father      Alcohol/Drug Father      Hypertension Father      Depression Father      Eye Disorder Father         cataracts bilat     Lipids Father      Thyroid Disease Father         hypo     Hypertension Mother      Alzheimer Disease Mother         DX ~75     Eye Disorder Mother         cataract one eye      Osteoporosis Mother         early stages     Thyroid Disease Mother         hypo     Genitourinary Problems Sister         kidney stones     Lipids Sister        Objective:  Data Unavailable Data Unavailable Data Unavailable Data Unavailable Data Unavailable 0 lbs 0 oz    PT and DP pulses  are 2/4 bilaterally. CRT is instant.  Positive pedal hair.   Gross sensation is intact bilaterally.   Equinus is noted bilaterally.  Dorsal osteophytes noted to bilateral mid feet at about the areas of the base of the metatarsals.  These do cause pain with palpation.  No pain with midtarsal joint range of motion.  No pain with first or fifth ray range of motion.  No ankle pain.  Nails normal bilaterally. No open lesions are noted.     bilateral foot xrays indicated in 6 weightbearing views.    Severe degenerative changes noted to the 2nd ad 3rd tatsometatarsal joints. No acute processes noted.       Assessment:   Encounter Diagnoses   Name Primary?     Arthritis of midtarsal joint of left foot Yes     Left foot pain      Arthritis of midtarsal joint of right foot      Right foot pain          Plan:  - Pt seen and evaluated.  -X-rays taken and discussed with her.  - I discussed with her that the treatment options are limited, 2/2 extent of the degenerative changes. Recommend topical Voltaren for symptom control. In order to get rid of the bone spurring, she would need exostectomies and likely joint arthrodesis.   - Voltaren was sent to the pharmacy.  - See again PRN.            Vidal Smith DPM

## 2022-11-20 ENCOUNTER — HEALTH MAINTENANCE LETTER (OUTPATIENT)
Age: 67
End: 2022-11-20

## 2023-02-26 NOTE — PROGRESS NOTES
MEDICAL ONCOLOGY FOLLOW-UP PATIENT VISIT:    NAME: Remedios Osorio     DATE: 2/27/2023    PRIMARY CARE PHYSICIAN: Kae Caal    PATIENT ID: Clinical Stage II-B Breast Cancer    Oncology History: Remedios Osorio is a 67 year old female with a h/o stage IIIa, [id number], ER positive, AK positive, HER2 non-amplified invasive ductal carcinoma of the left breast. Her PCP palpated a mass in the lower outer left breast in 01/2016. Diagnostic mammogram and ultrasound showed a 3.3 cm mass at 4:30, 8 cm from the nipple. She was also found to have multiple abnormal left axillary lymph nodes. The largest one was 2.4 cm. Biopsy of her left breast mass demonstrated invasive ductal carcinoma, grade 3 with extensive tumor necrosis. Axillary LN biopsy was also stim and the other is called GRA forte.  Positive for malignancy. The tumor cells were strongly positive for estrogen receptor (> 95%) and moderate to strongly positive for progesterone receptor (60-70%). HER2/lizz was non-amplified by FISH.  PET/CT showed the left breast mass, 5 hypermetabolic left axillary lymph nodes, indeterminate left cervical chain lymph nodes, and an indeterminate hypodensity in the dome of the liver. She received 11 cycles of weekly Taxol, the 12th was cancelled due to neuropathy. She received one cycle of ddAC but did not tolerate it due to fatigue and generalized weakness. She declined further chemotherapy.    Left breast lumpectomy and left axillary lymph node dissection was performed by Dr. Amezcua on 6/29/16.  Pathology showed a residual 1.6 cm grade 2, IDC in the left breast.  Surgical margins were negative.  Invasive tumor cellularity of 30%.  Tumor infiltrating lymphocytes were estimated at 50%.  6/19 excised lymph nodes were involved with malignancy.  The largest lymph node metastasis measured 3.9 cm and had a 1 mm area of extranodal extension.  Minimal treatment related changes were noted in the lymph nodes.  Pathologic staging was  [id number], or stage IIIa.  [facility] residual cancer burden was Class III.  Adjuvant Xeloda was recommended, she declined.  She completed radiation on 10/17/16.  She declined zometa for prevention of bone metastases.  She has been on letrozole since early 09/2016.    Interim History:   Ms. Osorio comes in the clinic today for routine breast cancer follow-up.  She has been under an increased amount of stress.  She was previously living with her sister and nephew, however, they recently moved out.  They left on not-so-good terms.  She is now left with having to do much of the maintenance of her house herself.  In addition, her dog has kidney failure and she thinks it is likely she will have to put him down soon.  She confirms that she continues on treatment with letrozole.  She is tolerating the medication well.  Since her last visit, she has developed significant pain in her bilateral feet.  She states she was diagnosed with severe osteoarthritis of the feet and surgery has been recommended.  She has an appointment for a second opinion at [facility] orthopedics tomorrow.  She states the pain in her feet is significantly limiting her mobility.  She also has increased numbness in her toes which extends through the dorsum of the foot and up into the knee.  She has chronic midline low back pain with left lower extremity sciatica which is unchanged from previous.  She frequently sees a chiropractor notes that the back pain is better after these visits.  She continues to take a natural thyroid supplement.  Her sister who is a naturopath is regulating the dosing of this.  She denies breast lumps, pain, or swelling.  She has no cough, shortness of breath, or chest pain.  She has no new bone or joint aches or pains.  The remainder of a complete 12 point review of systems was reviewed with the patient was negative with exception that mentioned above.    PAST MEDICAL HISTORY:   Past Medical History:   Diagnosis Date     ABNL  LIVER [facility] STUDY  W/ MONO  5/01     Breast cancer (H)      CHR BLOOD LOSS ANEMIA DUE TO FIBROIDS 6/9/2005     ELEVATED HBA1C 6.2% 6/05 8/6/2005     HX MUCOUS POLYPS OF CERVIX  2000     HYPERLIPIDEMIA       Infectious mononucleosis 5/01     MENORRHAGIA      MIGRAINE HEADACHES      MORBID OBESITY BMI 44.79 6/05 6/12/2005     UTERINE LEIOMYOMA  2/7/2003       PAST SURGICAL HISTORY:  Past Surgical History:   Procedure Laterality Date     CL AFF SURGICAL PATHOLOGY  2005    UTERINE LEIOMYOMA  [D25.9]     HC BIOPSY/EXCIS CERVICAL LESION W/WO FULGURATION  08-15-00,12-    endocervical polypectomy     HC TOOTH EXTRACTION W/FORCEP      wisdom teeth     LUMPECTOMY BREAST WITH SENTINEL NODE, COMBINED Left 6/29/2016    Segmental mastectomy with axillary lymph node dissection     REMOVE PORT VASCULAR ACCESS Right 6/29/2016    Procedure: REMOVE PORT VASCULAR ACCESS;  Surgeon: Gianna Amezcua MD;  Location:  OR     MEDS:  Current Outpatient Medications   Medication     Calcium-Magnesium-Vitamin D (CALCIUM MAGNESIUM PO)     diclofenac (VOLTAREN) 1 % topical gel     fluocinonide (LIDEX) 0.05 % external cream     letrozole (FEMARA) 2.5 MG tablet     melatonin 3 MG CAPS     Multiple Minerals-Vitamins (CALCIUM CITRATE PLUS/MAGNESIUM PO)     order for DME     UNABLE TO FIND     UNABLE TO FIND     VITAMIN D PO     No current facility-administered medications for this visit.     Facility-Administered Medications Ordered in Other Visits   Medication     lidocaine BUFFERED 1 % solution 10 mL       ALLERGIES:  Allergies   Allergen Reactions     Benadryl [Diphenhydramine] Other (See Comments)     Pt had severe hypotension, nausea and vasovagal type reaction to IV Benadryl.   Give PO only.      Cats      Keflex [Cephalexin]      rash        PHYSICAL EXAM:  BP (!) 158/81   Pulse 99   Temp 98  F (36.7  C) (Oral)   Resp 16   Wt 113.2 kg (249 lb 8 oz)   LMP 10/28/2009   SpO2 96%   BMI 44.65 kg/m    LMP 10/28/2009   General:   Obese adult female in NAD.  Alert and oriented x 3.  HEENT:  Normocephalic.  Sclera anicteric.  MMM.  No lesions of the oropharynx.  Lymph:  No palpable cervical, supraclavicular, or axillary LAD.  Breast:  Bilateral breasts are large and of normal fibroglandular density.  There are no discretely palpable masses in either breast.  Abd:  Soft/ND.    Ext:  No pitting edema of the bilateral lower extremities.    Musculo:  Full ROM of the bilateral upper extremities.  Neuro:  Cranial nerves grossly intact.  Gait is stable.  Able to climb on the exam table unaided.  Psych:  Mood and affect appear normal.  Skin:  No visible concerning skin rashes or lesions.    LABS REVIEWED THIS VISIT:  2/27/2023 Labs:  Electrolytes, kidney, and liver function tests are wnl.  TSH is wnl at 0.38  Blood counts are wnl.    2/27/2023 DEXA bone density scan:  Lowest T-score is 0 in the left femoral neck    IMPRESSION/PLAN: Ms. Osorio is a 67 year old female with a h/o stage IIIa, T0bX1A0, ER/[place] positive, HER2 negative left breast cancer. She is s/p 11 weeks of weekly taxol, 1 cycle of ddAC, left breast lumpectomy, ALND, and adjuvant radiation.  She has been on letrozole since early September, 2016.      1.  Stage III breast cancer:  Remedios is 6 years, 8 months out from excision of a left breast cancer.  She is on treatment with letrozole.  She is tolerating the medication well.  We plan to continue letrozole to complete a total 10 years of endocrine therapy (through 09/2026).      She is asymptomatic of disease recurrence on history today and clinical exam is without concerning findings.  I will see her back in 6 months with mammograms the same day.    2.  Bone Health:  She is at risk for bone loss on aromatase inhibitor therapy.  DEXA performed today was reviewed and shows a lowest T-score of 0 in the left femoral neck.  She does have a T-score of -1.2 in L2, however average for L1-L4 is 0.4. Bone density is overall stable from two years ago.   She is s/p treatment with 2 years Zometa from 09/2019 - 10/2021, she did not tolerate the medication with significant joint pains following each dose.  Recommend she take calcium 1200 mg, vitamin D 1000 international unit(s), and 20-30 minutes of daily weight bearing activity.      3.  Morbid obesity:  BMI is 44.65.  Patients with increased BMI have worse breast cancer outcomes and losing weight can improve these outcomes.  Weight is overall stable since last visit.  She now has decreased mobility due to severe osteoarthritis of the feet.  We discussed with decreased mobility, really need to focus on dietary changes.  She will again try to reduce carbohydrate intake, but admits this has been difficult in the setting of recent life stressors.    4.  Stress/Anxiety:  Her sister and nephew recently moved out, leaving her with all health maintenance.  Her dog is sick. She admits that all of this has significantly increased her anxiety.  She reports her other sister is a good support for her.  I offered referral to psychology, she declined at this time.    5.  Hypothyroidism:  She declines treatment with levothyroxine and instead has been taking Marcelo-Stim and GRA-Forte supplements advised by her sister who is a homeopath.  TSH checked today and remains wnl.    6.  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    7.  Follow Up:  Return to clinic 8/23/2023 or later for bilateral screening mammograms and visit with me.    32 minutes spent on the date of the encounter doing chart review, review of test results, interpretation of tests, patient visit and documentation.

## 2023-02-27 ENCOUNTER — APPOINTMENT (OUTPATIENT)
Dept: LAB | Facility: CLINIC | Age: 68
End: 2023-02-27
Attending: INTERNAL MEDICINE
Payer: COMMERCIAL

## 2023-02-27 ENCOUNTER — ANCILLARY PROCEDURE (OUTPATIENT)
Dept: BONE DENSITY | Facility: CLINIC | Age: 68
End: 2023-02-27
Attending: INTERNAL MEDICINE
Payer: COMMERCIAL

## 2023-02-27 ENCOUNTER — ONCOLOGY VISIT (OUTPATIENT)
Dept: ONCOLOGY | Facility: CLINIC | Age: 68
End: 2023-02-27
Attending: INTERNAL MEDICINE
Payer: COMMERCIAL

## 2023-02-27 VITALS
HEART RATE: 99 BPM | SYSTOLIC BLOOD PRESSURE: 158 MMHG | OXYGEN SATURATION: 96 % | TEMPERATURE: 98 F | RESPIRATION RATE: 16 BRPM | WEIGHT: 249.5 LBS | BODY MASS INDEX: 44.65 KG/M2 | DIASTOLIC BLOOD PRESSURE: 81 MMHG

## 2023-02-27 DIAGNOSIS — F41.1 GENERALIZED ANXIETY DISORDER: ICD-10-CM

## 2023-02-27 DIAGNOSIS — Z92.21 STATUS POST CHEMOTHERAPY: ICD-10-CM

## 2023-02-27 DIAGNOSIS — Z12.31 ENCOUNTER FOR SCREENING MAMMOGRAM FOR BREAST CANCER: ICD-10-CM

## 2023-02-27 DIAGNOSIS — Z79.811 LONG TERM CURRENT USE OF AROMATASE INHIBITOR: ICD-10-CM

## 2023-02-27 DIAGNOSIS — E66.01 MORBID OBESITY (H): ICD-10-CM

## 2023-02-27 DIAGNOSIS — E06.3 HYPOTHYROIDISM DUE TO HASHIMOTO'S THYROIDITIS: ICD-10-CM

## 2023-02-27 DIAGNOSIS — C50.512 MALIGNANT NEOPLASM OF LOWER-OUTER QUADRANT OF LEFT BREAST OF FEMALE, ESTROGEN RECEPTOR POSITIVE (H): Primary | ICD-10-CM

## 2023-02-27 DIAGNOSIS — Z17.0 MALIGNANT NEOPLASM OF LOWER-OUTER QUADRANT OF LEFT BREAST OF FEMALE, ESTROGEN RECEPTOR POSITIVE (H): Primary | ICD-10-CM

## 2023-02-27 DIAGNOSIS — Z79.811 LONG TERM (CURRENT) USE OF AROMATASE INHIBITORS: ICD-10-CM

## 2023-02-27 LAB
ALBUMIN SERPL BCG-MCNC: 4.2 G/DL (ref 3.5–5.2)
ALP SERPL-CCNC: 80 U/L (ref 35–104)
ALT SERPL W P-5'-P-CCNC: 29 U/L (ref 10–35)
ANION GAP SERPL CALCULATED.3IONS-SCNC: 8 MMOL/L (ref 7–15)
AST SERPL W P-5'-P-CCNC: 24 U/L (ref 10–35)
BASOPHILS # BLD AUTO: 0 10E3/UL (ref 0–0.2)
BASOPHILS NFR BLD AUTO: 1 %
BILIRUB SERPL-MCNC: 0.2 MG/DL
BUN SERPL-MCNC: 13.3 MG/DL (ref 8–23)
CALCIUM SERPL-MCNC: 9.6 MG/DL (ref 8.8–10.2)
CHLORIDE SERPL-SCNC: 104 MMOL/L (ref 98–107)
CREAT SERPL-MCNC: 0.84 MG/DL (ref 0.51–0.95)
DEPRECATED HCO3 PLAS-SCNC: 25 MMOL/L (ref 22–29)
EOSINOPHIL # BLD AUTO: 0.1 10E3/UL (ref 0–0.7)
EOSINOPHIL NFR BLD AUTO: 2 %
ERYTHROCYTE [DISTWIDTH] IN BLOOD BY AUTOMATED COUNT: 13.7 % (ref 10–15)
GFR SERPL CREATININE-BSD FRML MDRD: 77 ML/MIN/1.73M2
GLUCOSE SERPL-MCNC: 110 MG/DL (ref 70–99)
HCT VFR BLD AUTO: 38.9 % (ref 35–47)
HGB BLD-MCNC: 13 G/DL (ref 11.7–15.7)
IMM GRANULOCYTES # BLD: 0 10E3/UL
IMM GRANULOCYTES NFR BLD: 0 %
LYMPHOCYTES # BLD AUTO: 1.3 10E3/UL (ref 0.8–5.3)
LYMPHOCYTES NFR BLD AUTO: 26 %
MCH RBC QN AUTO: 29.7 PG (ref 26.5–33)
MCHC RBC AUTO-ENTMCNC: 33.4 G/DL (ref 31.5–36.5)
MCV RBC AUTO: 89 FL (ref 78–100)
MONOCYTES # BLD AUTO: 0.6 10E3/UL (ref 0–1.3)
MONOCYTES NFR BLD AUTO: 12 %
NEUTROPHILS # BLD AUTO: 2.8 10E3/UL (ref 1.6–8.3)
NEUTROPHILS NFR BLD AUTO: 59 %
NRBC # BLD AUTO: 0 10E3/UL
NRBC BLD AUTO-RTO: 0 /100
PLATELET # BLD AUTO: 260 10E3/UL (ref 150–450)
POTASSIUM SERPL-SCNC: 5.2 MMOL/L (ref 3.4–5.3)
PROT SERPL-MCNC: 7.4 G/DL (ref 6.4–8.3)
RBC # BLD AUTO: 4.38 10E6/UL (ref 3.8–5.2)
SODIUM SERPL-SCNC: 137 MMOL/L (ref 136–145)
TSH SERPL DL<=0.005 MIU/L-ACNC: 0.38 UIU/ML (ref 0.3–4.2)
WBC # BLD AUTO: 4.8 10E3/UL (ref 4–11)

## 2023-02-27 PROCEDURE — 99214 OFFICE O/P EST MOD 30 MIN: CPT | Performed by: INTERNAL MEDICINE

## 2023-02-27 PROCEDURE — G0463 HOSPITAL OUTPT CLINIC VISIT: HCPCS

## 2023-02-27 PROCEDURE — 85025 COMPLETE CBC W/AUTO DIFF WBC: CPT | Performed by: INTERNAL MEDICINE

## 2023-02-27 PROCEDURE — 77080 DXA BONE DENSITY AXIAL: CPT | Performed by: INTERNAL MEDICINE

## 2023-02-27 PROCEDURE — 36415 COLL VENOUS BLD VENIPUNCTURE: CPT | Performed by: INTERNAL MEDICINE

## 2023-02-27 PROCEDURE — 77081 DXA BONE DENSITY APPENDICULR: CPT | Mod: XU | Performed by: INTERNAL MEDICINE

## 2023-02-27 PROCEDURE — 99212 OFFICE O/P EST SF 10 MIN: CPT | Performed by: INTERNAL MEDICINE

## 2023-02-27 PROCEDURE — 84443 ASSAY THYROID STIM HORMONE: CPT | Performed by: INTERNAL MEDICINE

## 2023-02-27 PROCEDURE — 84132 ASSAY OF SERUM POTASSIUM: CPT | Performed by: INTERNAL MEDICINE

## 2023-02-27 ASSESSMENT — PAIN SCALES - GENERAL: PAINLEVEL: EXTREME PAIN (8)

## 2023-02-27 NOTE — LETTER
2/27/2023         RE: Remedios Osorio  [address]  [facility] [id number]        Dear Colleague,    Thank you for referring your patient, Remedios Osorio, to the [facility]. Please see a copy of my visit note below.    MEDICAL ONCOLOGY FOLLOW-UP PATIENT VISIT:    NAME: Remedios Osorio     DATE: 2/27/2023    PRIMARY CARE PHYSICIAN: Kae Caal    PATIENT ID: Clinical Stage II-B Breast Cancer    Oncology History: Remedios Osorio is a 67 year old female with a h/o stage IIIa, [id number], ER positive, GA positive, HER2 non-amplified invasive ductal carcinoma of the left breast. Her PCP palpated a mass in the lower outer left breast in 01/2016. Diagnostic mammogram and ultrasound showed a 3.3 cm mass at 4:30, 8 cm from the nipple. She was also found to have multiple abnormal left axillary lymph nodes. The largest one was 2.4 cm. Biopsy of her left breast mass demonstrated invasive ductal carcinoma, grade 3 with extensive tumor necrosis. Axillary LN biopsy was also stim and the other is called GRA forte.  Positive for malignancy. The tumor cells were strongly positive for estrogen receptor (> 95%) and moderate to strongly positive for progesterone receptor (60-70%). HER2/lizz was non-amplified by FISH.  PET/CT showed the left breast mass, 5 hypermetabolic left axillary lymph nodes, indeterminate left cervical chain lymph nodes, and an indeterminate hypodensity in the dome of the liver. She received 11 cycles of weekly Taxol, the 12th was cancelled due to neuropathy. She received one cycle of ddAC but did not tolerate it due to fatigue and generalized weakness. She declined further chemotherapy.    Left breast lumpectomy and left axillary lymph node dissection was performed by Dr. Amezcua on 6/29/16.  Pathology showed a residual 1.6 cm grade 2, IDC in the left breast.  Surgical margins were negative.  Invasive tumor cellularity of 30%.  Tumor infiltrating lymphocytes were estimated  at 50%.  6/19 excised lymph nodes were involved with malignancy.  The largest lymph node metastasis measured 3.9 cm and had a 1 mm area of extranodal extension.  Minimal treatment related changes were noted in the lymph nodes.  Pathologic staging was D1nL0M4, or stage IIIa.  [facility] residual cancer burden was Class III.  Adjuvant Xeloda was recommended, she declined.  She completed radiation on 10/17/16.  She declined zometa for prevention of bone metastases.  She has been on letrozole since early 09/2016.    Interim History:   Ms. Osorio comes in the clinic today for routine breast cancer follow-up.  She has been under an increased amount of stress.  She was previously living with her sister and nephew, however, they recently moved out.  They left on not-so-good terms.  She is now left with having to do much of the maintenance of her house herself.  In addition, her dog has kidney failure and she thinks it is likely she will have to put him down soon.  She confirms that she continues on treatment with letrozole.  She is tolerating the medication well.  Since her last visit, she has developed significant pain in her bilateral feet.  She states she was diagnosed with severe osteoarthritis of the feet and surgery has been recommended.  She has an appointment for a second opinion at [facility] orthopedics tomorrow.  She states the pain in her feet is significantly limiting her mobility.  She also has increased numbness in her toes which extends through the dorsum of the foot and up into the knee.  She has chronic midline low back pain with left lower extremity sciatica which is unchanged from previous.  She frequently sees a chiropractor notes that the back pain is better after these visits.  She continues to take a natural thyroid supplement.  Her sister who is a naturopath is regulating the dosing of this.  She denies breast lumps, pain, or swelling.  She has no cough, shortness of breath, or chest pain.  She  has no new bone or joint aches or pains.  The remainder of a complete 12 point review of systems was reviewed with the patient was negative with exception that mentioned above.    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       PAST SURGICAL HISTORY:  Past Surgical History:   Procedure Laterality Date     CL AFF SURGICAL PATHOLOGY  2005    UTERINE LEIOMYOMA  [D25.9]     HC BIOPSY/EXCIS CERVICAL LESION W/WO FULGURATION  08-15-00,12-    endocervical polypectomy     HC TOOTH EXTRACTION W/FORCEP      wisdom teeth     LUMPECTOMY BREAST WITH SENTINEL NODE, COMBINED Left 6/29/2016    Segmental mastectomy with axillary lymph node dissection     REMOVE PORT VASCULAR ACCESS Right 6/29/2016    Procedure: REMOVE PORT VASCULAR ACCESS;  Surgeon: Gianna Amezcua MD;  Location:  OR     MEDS:  Current Outpatient Medications   Medication     Calcium-Magnesium-Vitamin D (CALCIUM MAGNESIUM PO)     diclofenac (VOLTAREN) 1 % topical gel     fluocinonide (LIDEX) 0.05 % external cream     letrozole (FEMARA) 2.5 MG tablet     melatonin 3 MG CAPS     Multiple Minerals-Vitamins (CALCIUM CITRATE PLUS/MAGNESIUM PO)     order for DME     UNABLE TO FIND     UNABLE TO FIND     VITAMIN D PO     No current facility-administered medications for this visit.     Facility-Administered Medications Ordered in Other Visits   Medication     lidocaine BUFFERED 1 % solution 10 mL       ALLERGIES:  Allergies   Allergen Reactions     Benadryl [Diphenhydramine] Other (See Comments)     Pt had severe hypotension, nausea and vasovagal type reaction to IV Benadryl.   Give PO  only.      Cats      Keflex [Cephalexin]      rash        PHYSICAL EXAM:  BP (!) 158/81   Pulse 99   Temp 98  F (36.7  C) (Oral)   Resp 16   Wt 113.2 kg (249 lb 8 oz)   LMP 10/28/2009   SpO2 96%   BMI 44.65 kg/m    LMP 10/28/2009   General:  Obese adult female in NAD.  Alert and oriented x 3.  HEENT:  Normocephalic.  Sclera anicteric.  MMM.  No lesions of the oropharynx.  Lymph:  No palpable cervical, supraclavicular, or axillary LAD.  Breast:  Bilateral breasts are large and of normal fibroglandular density.  There are no discretely palpable masses in either breast.  Abd:  Soft/ND.    Ext:  No pitting edema of the bilateral lower extremities.    Musculo:  Full ROM of the bilateral upper extremities.  Neuro:  Cranial nerves grossly intact.  Gait is stable.  Able to climb on the exam table unaided.  Psych:  Mood and affect appear normal.  Skin:  No visible concerning skin rashes or lesions.    LABS REVIEWED THIS VISIT:  2/27/2023 Labs:  Electrolytes, kidney, and liver function tests are wnl.  TSH is wnl at 0.38  Blood counts are wnl.    2/27/2023 DEXA bone density scan:  Lowest T-score is 0 in the left femoral neck    IMPRESSION/PLAN: Ms. Osorio is a 67 year old female with a h/o stage IIIa, Q2yF4F5, ER/ID positive, HER2 negative left breast cancer. She is s/p 11 weeks of weekly taxol, 1 cycle of ddAC, left breast lumpectomy, ALND, and adjuvant radiation.  She has been on letrozole since early September, 2016.      1.  Stage III breast cancer:  Remedios is 6 years, 8 months out from excision of a left breast cancer.  She is on treatment with letrozole.  She is tolerating the medication well.  We plan to continue letrozole to complete a total 10 years of endocrine therapy (through 09/2026).      She is asymptomatic of disease recurrence on history today and clinical exam is without concerning findings.  I will see her back in 6 months with mammograms the same day.    2.  Bone Health:  She is at risk for bone loss  on aromatase inhibitor therapy.  DEXA performed today was reviewed and shows a lowest T-score of 0 in the left femoral neck.  She does have a T-score of -1.2 in L2, however average for L1-L4 is 0.4. Bone density is overall stable from two years ago.  She is s/p treatment with 2 years Zometa from 09/2019 - 10/2021, she did not tolerate the medication with significant joint pains following each dose.  Recommend she take calcium 1200 mg, vitamin D 1000 international unit(s), and 20-30 minutes of daily weight bearing activity.      3.  Morbid obesity:  BMI is 44.65.  Patients with increased BMI have worse breast cancer outcomes and losing weight can improve these outcomes.  Weight is overall stable since last visit.  She now has decreased mobility due to severe osteoarthritis of the feet.  We discussed with decreased mobility, really need to focus on dietary changes.  She will again try to reduce carbohydrate intake, but admits this has been difficult in the setting of recent life stressors.    4.  Stress/Anxiety:  Her sister and nephew recently moved out, leaving her with all health maintenance.  Her dog is sick. She admits that all of this has significantly increased her anxiety.  She reports her other sister is a good support for her.  I offered referral to psychology, she declined at this time.    5.  Hypothyroidism:  She declines treatment with levothyroxine and instead has been taking Marcelo-Stim and GRA-Forte supplements advised by her sister who is a homeopath.  TSH checked today and remains wnl.    6.  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    7.  Follow Up:  Return to clinic 8/23/2023 or later for bilateral screening mammograms and visit with me.    32 minutes spent on the date of the encounter doing chart review, review of test results, interpretation of tests, patient visit and  documentation.       Kanchan Patel MD

## 2023-02-27 NOTE — NURSING NOTE
Chief Complaint   Patient presents with     Blood Draw     Labs drawn via  by RN. Vitals taken.     Labs drawn with  by RN. Vitals taken. Patient checked into next appointment.    Leonor Wing RN

## 2023-02-27 NOTE — NURSING NOTE
"Oncology Rooming Note    February 27, 2023 3:23 PM   Remedios Osorio is a 67 year old female who presents for:    Chief Complaint   Patient presents with     Blood Draw     Labs drawn via  by RN. Vitals taken.     Oncology Clinic Visit     Breast Ca     Initial Vitals: BP (!) 158/81   Pulse 99   Temp 98  F (36.7  C) (Oral)   Resp 16   Wt 113.2 kg (249 lb 8 oz)   LMP 10/28/2009   SpO2 96%   BMI 44.65 kg/m   Estimated body mass index is 44.65 kg/m  as calculated from the following:    Height as of 8/16/21: 1.592 m (5' 2.68\").    Weight as of this encounter: 113.2 kg (249 lb 8 oz). Body surface area is 2.24 meters squared.  Extreme Pain (8) Comment: Data Unavailable   Patient's last menstrual period was 10/28/2009.  Allergies reviewed: Yes  Medications reviewed: Yes    Medications: Medication refills not needed today.  Pharmacy name entered into [employer]:    [facility] [place] - [place], [place] - [address].  [id number] - [place], [place] - [address] [id number] [place] - [address] [facility] [place] - [address] [facility] [place], [place] [address] [facility] [place], [place] [address]  [facility] - [place], [place] - [address] [facility] [place], [place] - [address] [facility] - [place], [place] - [address] [place] [address]    Clinical concerns:        Mary Hennessy CMA              "

## 2023-03-09 DIAGNOSIS — Z17.0 MALIGNANT NEOPLASM OF LOWER-OUTER QUADRANT OF LEFT BREAST OF FEMALE, ESTROGEN RECEPTOR POSITIVE (H): ICD-10-CM

## 2023-03-09 DIAGNOSIS — C50.512 MALIGNANT NEOPLASM OF LOWER-OUTER QUADRANT OF LEFT BREAST OF FEMALE, ESTROGEN RECEPTOR POSITIVE (H): ICD-10-CM

## 2023-03-09 RX ORDER — LETROZOLE 2.5 MG/1
TABLET, FILM COATED ORAL
Qty: 90 TABLET | Refills: 3 | Status: SHIPPED | OUTPATIENT
Start: 2023-03-09 | End: 2024-04-09

## 2023-03-09 NOTE — TELEPHONE ENCOUNTER
"Letrozole 2.5mg tab  Last prescribing provider: Dr. Patel on 2/21/22    Last clinic visit date: 2/27/23    Recommendations for requested medication (if none, N/A): 2/27/23, \"She is on treatment with letrozole.  She is tolerating the medication well.  We plan to continue letrozole to complete a total 10 years of endocrine therapy (through 09/2026).  \"    Any other pertinent information (if none, N/A): NA    Refilled: Y/N, if NO, why?    Routed to Dr. Patel.     "

## 2023-08-27 NOTE — PROGRESS NOTES
MEDICAL ONCOLOGY FOLLOW-UP PATIENT VISIT:    NAME: Remedios Osorio     DATE: 8/28/2023    PRIMARY CARE PHYSICIAN: Kae Caal    PATIENT ID: Clinical Stage II-B Breast Cancer    Oncology History: Remedios Osorio is a 68 year old female with a h/o stage IIIa, B1yH2fO6, ER positive, HI positive, HER2 non-amplified invasive ductal carcinoma of the left breast. Her PCP palpated a mass in the lower outer left breast in 01/2016. Diagnostic mammogram and ultrasound showed a 3.3 cm mass at 4:30, 8 cm from the nipple. She was also found to have multiple abnormal left axillary lymph nodes. The largest one was 2.4 cm. Biopsy of her left breast mass demonstrated invasive ductal carcinoma, grade 3 with extensive tumor necrosis. Axillary LN biopsy was also stim and the other is called GRA forte.  Positive for malignancy. The tumor cells were strongly positive for estrogen receptor (> 95%) and moderate to strongly positive for progesterone receptor (60-70%). HER2/lizz was non-amplified by FISH.  PET/CT showed the left breast mass, 5 hypermetabolic left axillary lymph nodes, indeterminate left cervical chain lymph nodes, and an indeterminate hypodensity in the dome of the liver. She received 11 cycles of weekly Taxol, the 12th was cancelled due to neuropathy. She received one cycle of ddAC but did not tolerate it due to fatigue and generalized weakness. She declined further chemotherapy.    Left breast lumpectomy and left axillary lymph node dissection was performed by Dr. Amezcua on 6/29/16.  Pathology showed residual 1.6 cm grade 2, IDC in the left breast.  Surgical margins were negative.  Invasive tumor cellularity of 30%.  Tumor infiltrating lymphocytes were estimated at 50%.  6/19 excised lymph nodes were involved with malignancy.  The largest lymph node metastasis measured 3.9 cm and had a 1 mm area of extranodal extension.  Minimal treatment related changes were noted in the lymph nodes.  Pathologic staging was M8yA7X4,  or stage IIIa.  [facility] residual cancer burden was Class III.  Adjuvant Xeloda was recommended, she declined.  She completed radiation on 10/17/16.  She declined zometa for prevention of bone metastases.  She has been on letrozole since early 09/2016.    Interim History:   Ms. Osorio is doing well today. She denies any new lumps/abnormal breast findings. She endorses taking her letrozole consistently. She endorses some knee stiffness and has hot flashes every once in a while. She has difficulty sleeping, but takes melatonin at night which seems to help. She is still taking vitamin D for her bone health.  She is no longer taking calcium. Her sister is a homeopath and has recommended other vitamins/supplements for her in addition to her thyroid replacement supplement.     Most bothersome to her today is arthritic pain in her feet (L>R). She received steroid injections in both feet in February, which gave her some relief initially. However, her left foot has become increasingly more painful since July making it difficult for her to walk around. She just bought new shoes with orthotic inserts, which seem to be helping her pain. She recognizes that she needs to lose more weight, but is hopeful that being able to walk comfortably again will help this. She is not interested in medicines to assist with weight loss, both from a cost perspective but also because she prefers more natural treatment options. We discussed that diet is a major part of weight loss. She was understanding of this and has previously lost a lot of weight using the keto diet. We discussed that her arthritic pain likely would also improve with weight loss.     PAST MEDICAL HISTORY:   Past Medical History:   Diagnosis Date     ABNL LIVER [facility] STUDY  W/ MONO  5/01     Breast cancer (H)      CHR BLOOD LOSS ANEMIA DUE TO FIBROIDS 6/9/2005     ELEVATED HBA1C 6.2% 6/05 8/6/2005     HX MUCOUS POLYPS OF CERVIX  2000     HYPERLIPIDEMIA       Infectious  mononucleosis 5/01     MENORRHAGIA      MIGRAINE HEADACHES      MORBID OBESITY BMI 44.79 6/05 6/12/2005     UTERINE LEIOMYOMA  2/7/2003       PAST SURGICAL HISTORY:  Past Surgical History:   Procedure Laterality Date     CL AFF SURGICAL PATHOLOGY  2005    UTERINE LEIOMYOMA  [D25.9]     HC BIOPSY/EXCIS CERVICAL LESION W/WO FULGURATION  08-15-00,12-    endocervical polypectomy     HC TOOTH EXTRACTION W/FORCEP      wisdom teeth     LUMPECTOMY BREAST WITH SENTINEL NODE, COMBINED Left 6/29/2016    Segmental mastectomy with axillary lymph node dissection     REMOVE PORT VASCULAR ACCESS Right 6/29/2016    Procedure: REMOVE PORT VASCULAR ACCESS;  Surgeon: Gianna Amezcua MD;  Location:  OR     MEDS:  Current Outpatient Medications   Medication     Calcium-Magnesium-Vitamin D (CALCIUM MAGNESIUM PO)     diclofenac (VOLTAREN) 1 % topical gel     fluocinonide (LIDEX) 0.05 % external cream     letrozole (FEMARA) 2.5 MG tablet     melatonin 3 MG CAPS     Multiple Minerals-Vitamins (CALCIUM CITRATE PLUS/MAGNESIUM PO)     order for DME     UNABLE TO FIND     UNABLE TO FIND     VITAMIN D PO     No current facility-administered medications for this visit.     Facility-Administered Medications Ordered in Other Visits   Medication     lidocaine BUFFERED 1 % solution 10 mL       ALLERGIES:  Allergies   Allergen Reactions     Benadryl [Diphenhydramine] Other (See Comments)     Pt had severe hypotension, nausea and vasovagal type reaction to IV Benadryl.   Give PO only.      Cats      Keflex [Cephalexin]      rash        PHYSICAL EXAM:  BP (!) 152/87   Pulse 101   Temp 99  F (37.2  C) (Oral)   Resp 18   Wt 110.7 kg (244 lb 1.6 oz)   LMP 10/28/2009   SpO2 96%   BMI 43.69 kg/m    General:  Morbidly obese adult female in NAD.  Alert and oriented x 3.  HEENT:  Normocephalic.  Sclera anicteric.  MMM.  Lymph:  No palpable cervical, supraclavicular, or axillary LAD.  CV: RRR, no murmurs, rubs, gallops  Resp: CTAB, normal  work of breathing on room air   Breast:  Bilateral breasts are large and of normal fibroglandular density.  There are no discretely palpable masses in either breast.  Bilateral nipples are everted and without discharge.  Ext:  No pitting edema of the bilateral lower extremities.    Musculo:  Full ROM of the bilateral upper extremities.  Neuro:  Cranial nerves are grossly intact.  Gait is stable.  Able to climb on the exam table unaided.  Psych:  Mood and affect appear normal.  Skin:  No visible concerning skin rashes or lesions.    LABS REVIEWED THIS VISIT:  I personally reviewed the below images and laboratories:    8/28/2023 Bilateral screening mammograms:  There are no concerning findings or significant change when compared to prior.    8/28/2023 Labs:  Electrolytes, kidney, and liver function tests are all wnl.  Blood counts are wnl.   breast cancer tumor marker is wnl.    IMPRESSION/PLAN: Ms. Osorio is a 68 year old female with a h/o stage IIIa, R0iO2K5, ER/MT positive, HER2 negative left breast cancer. She is s/p 11 weeks of weekly taxol, 1 cycle of ddAC, left breast lumpectomy, ALND, and adjuvant radiation.  She has been on letrozole since early September, 2016.      1.  Stage III breast cancer:  Remedios is 7 years, 2 months out from excision of a left breast cancer.  She is on treatment with letrozole.  She is tolerating the medication well.  We plan to continue letrozole to complete a total 10 years of endocrine therapy (through 09/2026).      She is asymptomatic of disease recurrence on history today and clinical exam is without concerning findings. I personally reviewed the images of bilateral screening mammograms performed today which show no obvious evidence of malignancy. Formal radiologist read is pending and will be released to the patient in 51 Give when available. I will see her back in 6 months.    2.  Bone Health:  She is at risk for bone loss on aromatase inhibitor therapy.  DEXA 02/2023 with  a lowest T-score of 0 in the left femoral neck.  She has a T-score of -1.2 in L2, however average for L1-L4 is 0.4.     She is s/p treatment with 2 years Zometa from 09/2019 - 10/2021, she did not tolerate the medication with significant joint pains following each dose.  She is taking a vitamin D supplement. Physical activity is limited by her arthritis in her feet, but she hopes to be able to walk more comfortably with her new orthotics/shoes.     3.  Morbid obesity:  BMI is 43.69.  Patients with increased BMI have worse breast cancer outcomes.  Weight is overall stable to slightly increased since last visit.  She has decreased mobility due to severe osteoarthritis of the feet. We discussed that weight loss may improve the pain in her feet. We discussed dietary changes are very important in reducing weight.  We also discussed the option for glucagon-peptide like receptor agonists such as semaglutide and liraglutide in weight loss. She is not interested in these medications right now. We encouraged participation in a program such as weight watchers.    4.  Hypothyroidism:  She declines treatment with levothyroxine and instead has been taking supplements advised by her sister who is a homeopath.  TSH checked in February 2023 and within normal limits.    5.  Follow Up:  Labs and return visit in 6 months.    Patient was seen and discussed with 4th year medical student, Anayeli Smart.  The medical student was personally supervised by me during the patient examination. I personally verified the medical history, performed a physical exam and the medical decision-making. I made appropriate changes to the documentation and the assessment and plan based on my verification, exam, and medical decision making.  I personally spent 35 minutes on the date of the encounter doing chart review, review of test results, interpretation of tests, patient visit and documentation.     Kanchan Patel MD

## 2023-08-28 ENCOUNTER — ANCILLARY PROCEDURE (OUTPATIENT)
Dept: MAMMOGRAPHY | Facility: CLINIC | Age: 68
End: 2023-08-28
Attending: INTERNAL MEDICINE
Payer: COMMERCIAL

## 2023-08-28 ENCOUNTER — ONCOLOGY VISIT (OUTPATIENT)
Dept: ONCOLOGY | Facility: CLINIC | Age: 68
End: 2023-08-28
Attending: INTERNAL MEDICINE
Payer: COMMERCIAL

## 2023-08-28 ENCOUNTER — APPOINTMENT (OUTPATIENT)
Dept: LAB | Facility: CLINIC | Age: 68
End: 2023-08-28
Attending: INTERNAL MEDICINE
Payer: COMMERCIAL

## 2023-08-28 VITALS
OXYGEN SATURATION: 96 % | BODY MASS INDEX: 43.69 KG/M2 | DIASTOLIC BLOOD PRESSURE: 87 MMHG | TEMPERATURE: 99 F | HEART RATE: 101 BPM | SYSTOLIC BLOOD PRESSURE: 152 MMHG | WEIGHT: 244.1 LBS | RESPIRATION RATE: 18 BRPM

## 2023-08-28 DIAGNOSIS — C50.512 MALIGNANT NEOPLASM OF LOWER-OUTER QUADRANT OF LEFT BREAST OF FEMALE, ESTROGEN RECEPTOR POSITIVE (H): Primary | ICD-10-CM

## 2023-08-28 DIAGNOSIS — C50.912 BREAST CANCER METASTASIZED TO AXILLARY LYMPH NODE, LEFT (H): ICD-10-CM

## 2023-08-28 DIAGNOSIS — Z12.31 ENCOUNTER FOR SCREENING MAMMOGRAM FOR BREAST CANCER: ICD-10-CM

## 2023-08-28 DIAGNOSIS — Z17.0 MALIGNANT NEOPLASM OF LOWER-OUTER QUADRANT OF LEFT BREAST OF FEMALE, ESTROGEN RECEPTOR POSITIVE (H): Primary | ICD-10-CM

## 2023-08-28 DIAGNOSIS — C77.3 BREAST CANCER METASTASIZED TO AXILLARY LYMPH NODE, LEFT (H): ICD-10-CM

## 2023-08-28 DIAGNOSIS — Z92.21 STATUS POST CHEMOTHERAPY: ICD-10-CM

## 2023-08-28 DIAGNOSIS — E66.01 MORBID OBESITY (H): ICD-10-CM

## 2023-08-28 LAB
ALBUMIN SERPL BCG-MCNC: 4.2 G/DL (ref 3.5–5.2)
ALP SERPL-CCNC: 83 U/L (ref 35–104)
ALT SERPL W P-5'-P-CCNC: 22 U/L (ref 0–50)
ANION GAP SERPL CALCULATED.3IONS-SCNC: 8 MMOL/L (ref 7–15)
AST SERPL W P-5'-P-CCNC: 20 U/L (ref 0–45)
BASOPHILS # BLD AUTO: 0 10E3/UL (ref 0–0.2)
BASOPHILS NFR BLD AUTO: 1 %
BILIRUB SERPL-MCNC: 0.2 MG/DL
BUN SERPL-MCNC: 18.2 MG/DL (ref 8–23)
CALCIUM SERPL-MCNC: 9.7 MG/DL (ref 8.8–10.2)
CANCER AG15-3 SERPL-ACNC: 10 U/ML
CHLORIDE SERPL-SCNC: 104 MMOL/L (ref 98–107)
CREAT SERPL-MCNC: 0.78 MG/DL (ref 0.51–0.95)
DEPRECATED HCO3 PLAS-SCNC: 26 MMOL/L (ref 22–29)
EOSINOPHIL # BLD AUTO: 0.2 10E3/UL (ref 0–0.7)
EOSINOPHIL NFR BLD AUTO: 3 %
ERYTHROCYTE [DISTWIDTH] IN BLOOD BY AUTOMATED COUNT: 14.4 % (ref 10–15)
GFR SERPL CREATININE-BSD FRML MDRD: 83 ML/MIN/1.73M2
GLUCOSE SERPL-MCNC: 114 MG/DL (ref 70–99)
HCT VFR BLD AUTO: 37 % (ref 35–47)
HGB BLD-MCNC: 12.4 G/DL (ref 11.7–15.7)
IMM GRANULOCYTES # BLD: 0 10E3/UL
IMM GRANULOCYTES NFR BLD: 0 %
LYMPHOCYTES # BLD AUTO: 1.3 10E3/UL (ref 0.8–5.3)
LYMPHOCYTES NFR BLD AUTO: 23 %
MCH RBC QN AUTO: 30.1 PG (ref 26.5–33)
MCHC RBC AUTO-ENTMCNC: 33.5 G/DL (ref 31.5–36.5)
MCV RBC AUTO: 90 FL (ref 78–100)
MONOCYTES # BLD AUTO: 0.6 10E3/UL (ref 0–1.3)
MONOCYTES NFR BLD AUTO: 11 %
NEUTROPHILS # BLD AUTO: 3.6 10E3/UL (ref 1.6–8.3)
NEUTROPHILS NFR BLD AUTO: 62 %
NRBC # BLD AUTO: 0 10E3/UL
NRBC BLD AUTO-RTO: 0 /100
PLATELET # BLD AUTO: 257 10E3/UL (ref 150–450)
POTASSIUM SERPL-SCNC: 4.4 MMOL/L (ref 3.4–5.3)
PROT SERPL-MCNC: 6.9 G/DL (ref 6.4–8.3)
RBC # BLD AUTO: 4.12 10E6/UL (ref 3.8–5.2)
SODIUM SERPL-SCNC: 138 MMOL/L (ref 136–145)
WBC # BLD AUTO: 5.7 10E3/UL (ref 4–11)

## 2023-08-28 PROCEDURE — 85014 HEMATOCRIT: CPT | Performed by: INTERNAL MEDICINE

## 2023-08-28 PROCEDURE — 99214 OFFICE O/P EST MOD 30 MIN: CPT | Performed by: INTERNAL MEDICINE

## 2023-08-28 PROCEDURE — 77063 BREAST TOMOSYNTHESIS BI: CPT | Mod: GC | Performed by: RADIOLOGY

## 2023-08-28 PROCEDURE — 80053 COMPREHEN METABOLIC PANEL: CPT | Performed by: INTERNAL MEDICINE

## 2023-08-28 PROCEDURE — 36415 COLL VENOUS BLD VENIPUNCTURE: CPT | Performed by: INTERNAL MEDICINE

## 2023-08-28 PROCEDURE — 86300 IMMUNOASSAY TUMOR CA 15-3: CPT | Performed by: INTERNAL MEDICINE

## 2023-08-28 PROCEDURE — 99213 OFFICE O/P EST LOW 20 MIN: CPT | Performed by: INTERNAL MEDICINE

## 2023-08-28 PROCEDURE — 77067 SCR MAMMO BI INCL CAD: CPT | Mod: GC | Performed by: RADIOLOGY

## 2023-08-28 ASSESSMENT — PAIN SCALES - GENERAL: PAINLEVEL: NO PAIN (0)

## 2023-08-28 NOTE — LETTER
8/28/2023         RE: Remedios Osorio  [address]  [facility] [address]        Dear Colleague,    Thank you for referring your patient, Remedios Osorio, to the [facility]. Please see a copy of my visit note below.    MEDICAL ONCOLOGY FOLLOW-UP PATIENT VISIT:    NAME: Remedios Osorio     DATE: 8/28/2023    PRIMARY CARE PHYSICIAN: Kae Caal    PATIENT ID: Clinical Stage II-B Breast Cancer    Oncology History: Remedios Osorio is a 68 year old female with a h/o stage IIIa, [id number], ER positive, CT positive, HER2 non-amplified invasive ductal carcinoma of the left breast. Her PCP palpated a mass in the lower outer left breast in 01/2016. Diagnostic mammogram and ultrasound showed a 3.3 cm mass at 4:30, 8 cm from the nipple. She was also found to have multiple abnormal left axillary lymph nodes. The largest one was 2.4 cm. Biopsy of her left breast mass demonstrated invasive ductal carcinoma, grade 3 with extensive tumor necrosis. Axillary LN biopsy was also stim and the other is called GRA forte.  Positive for malignancy. The tumor cells were strongly positive for estrogen receptor (> 95%) and moderate to strongly positive for progesterone receptor (60-70%). HER2/lizz was non-amplified by FISH.  PET/CT showed the left breast mass, 5 hypermetabolic left axillary lymph nodes, indeterminate left cervical chain lymph nodes, and an indeterminate hypodensity in the dome of the liver. She received 11 cycles of weekly Taxol, the 12th was cancelled due to neuropathy. She received one cycle of ddAC but did not tolerate it due to fatigue and generalized weakness. She declined further chemotherapy.    Left breast lumpectomy and left axillary lymph node dissection was performed by Dr. Amezcua on 6/29/16.  Pathology showed residual 1.6 cm grade 2, IDC in the left breast.  Surgical margins were negative.  Invasive tumor cellularity of 30%.  Tumor infiltrating lymphocytes were estimated  at 50%.  6/19 excised lymph nodes were involved with malignancy.  The largest lymph node metastasis measured 3.9 cm and had a 1 mm area of extranodal extension.  Minimal treatment related changes were noted in the lymph nodes.  Pathologic staging was [id number], or stage IIIa.  [facility] residual cancer burden was Class III.  Adjuvant Xeloda was recommended, she declined.  She completed radiation on 10/17/16.  She declined zometa for prevention of bone metastases.  She has been on letrozole since early 09/2016.    Interim History:   Ms. Osorio is doing well today. She denies any new lumps/abnormal breast findings. She endorses taking her letrozole consistently. She endorses some knee stiffness and has hot flashes every once in a while. She has difficulty sleeping, but takes melatonin at night which seems to help. She is still taking vitamin D for her bone health.  She is no longer taking calcium. Her sister is a homeopath and has recommended other vitamins/supplements for her in addition to her thyroid replacement supplement.     Most bothersome to her today is arthritic pain in her feet (L>R). She received steroid injections in both feet in February, which gave her some relief initially. However, her left foot has become increasingly more painful since July making it difficult for her to walk around. She just bought new shoes with orthotic inserts, which seem to be helping her pain. She recognizes that she needs to lose more weight, but is hopeful that being able to walk comfortably again will help this. She is not interested in medicines to assist with weight loss, both from a cost perspective but also because she prefers more natural treatment options. We discussed that diet is a major part of weight loss. She was understanding of this and has previously lost a lot of weight using the keto diet. We discussed that her arthritic pain likely would also improve with weight loss.     PAST MEDICAL HISTORY:   Past  Medical History:   Diagnosis Date    ABNL LIVER FUN STUDY  W/ MONO  5/01    Breast cancer (H)     CHR BLOOD LOSS ANEMIA DUE TO FIBROIDS 6/9/2005    ELEVATED HBA1C 6.2% 6/05 8/6/2005    HX MUCOUS POLYPS OF CERVIX  2000    HYPERLIPIDEMIA      Infectious mononucleosis 5/01    MENORRHAGIA     MIGRAINE HEADACHES     MORBID OBESITY BMI 44.79 6/05 6/12/2005    UTERINE LEIOMYOMA  2/7/2003       PAST SURGICAL HISTORY:  Past Surgical History:   Procedure Laterality Date    CL AFF SURGICAL PATHOLOGY  2005    UTERINE LEIOMYOMA  [D25.9]    HC BIOPSY/EXCIS CERVICAL LESION W/WO FULGURATION  08-15-00,12-    endocervical polypectomy    HC TOOTH EXTRACTION W/FORCEP      wisdom teeth    LUMPECTOMY BREAST WITH SENTINEL NODE, COMBINED Left 6/29/2016    Segmental mastectomy with axillary lymph node dissection    REMOVE PORT VASCULAR ACCESS Right 6/29/2016    Procedure: REMOVE PORT VASCULAR ACCESS;  Surgeon: Gianna Amezcua MD;  Location:  OR     MEDS:  Current Outpatient Medications   Medication    Calcium-Magnesium-Vitamin D (CALCIUM MAGNESIUM PO)    diclofenac (VOLTAREN) 1 % topical gel    fluocinonide (LIDEX) 0.05 % external cream    letrozole (FEMARA) 2.5 MG tablet    melatonin 3 MG CAPS    Multiple Minerals-Vitamins (CALCIUM CITRATE PLUS/MAGNESIUM PO)    order for DME    UNABLE TO FIND    UNABLE TO FIND    VITAMIN D PO     No current facility-administered medications for this visit.     Facility-Administered Medications Ordered in Other Visits   Medication    lidocaine BUFFERED 1 % solution 10 mL       ALLERGIES:  Allergies   Allergen Reactions    Benadryl [Diphenhydramine] Other (See Comments)     Pt had severe hypotension, nausea and vasovagal type reaction to IV Benadryl.   Give PO only.     Cats     Keflex [Cephalexin]      rash        PHYSICAL EXAM:  BP (!) 152/87   Pulse 101   Temp 99  F (37.2  C) (Oral)   Resp 18   Wt 110.7 kg (244 lb 1.6 oz)   LMP 10/28/2009   SpO2 96%   BMI 43.69 kg/m    General:   Morbidly obese adult female in NAD.  Alert and oriented x 3.  HEENT:  Normocephalic.  Sclera anicteric.  MMM.  Lymph:  No palpable cervical, supraclavicular, or axillary LAD.  CV: RRR, no murmurs, rubs, gallops  Resp: CTAB, normal work of breathing on room air   Breast:  Bilateral breasts are large and of normal fibroglandular density.  There are no discretely palpable masses in either breast.  Bilateral nipples are everted and without discharge.  Ext:  No pitting edema of the bilateral lower extremities.    Musculo:  Full ROM of the bilateral upper extremities.  Neuro:  Cranial nerves are grossly intact.  Gait is stable.  Able to climb on the exam table unaided.  Psych:  Mood and affect appear normal.  Skin:  No visible concerning skin rashes or lesions.    LABS REVIEWED THIS VISIT:  I personally reviewed the below images and laboratories:    8/28/2023 Bilateral screening mammograms:  There are no concerning findings or significant change when compared to prior.    8/28/2023 Labs:  Electrolytes, kidney, and liver function tests are all wnl.  Blood counts are wnl.   breast cancer tumor marker is wnl.    IMPRESSION/PLAN: Ms. Osroio is a 68 year old female with a h/o stage IIIa, [id number], ER/TN positive, HER2 negative left breast cancer. She is s/p 11 weeks of weekly taxol, 1 cycle of ddAC, left breast lumpectomy, ALND, and adjuvant radiation.  She has been on letrozole since early September, 2016.      1.  Stage III breast cancer:  Remedios is 7 years, 2 months out from excision of a left breast cancer.  She is on treatment with letrozole.  She is tolerating the medication well.  We plan to continue letrozole to complete a total 10 years of endocrine therapy (through 09/2026).      She is asymptomatic of disease recurrence on history today and clinical exam is without concerning findings. I personally reviewed the images of bilateral screening mammograms performed today which show no obvious evidence of malignancy.  Formal radiologist read is pending and will be released to the patient in [place] when available. I will see her back in 6 months.    2.  Bone Health:  She is at risk for bone loss on aromatase inhibitor therapy.  DEXA 02/2023 with a lowest T-score of 0 in the left femoral neck.  She has a T-score of -1.2 in L2, however average for L1-L4 is 0.4.     She is s/p treatment with 2 years Zometa from 09/2019 - 10/2021, she did not tolerate the medication with significant joint pains following each dose.  She is taking a vitamin D supplement. Physical activity is limited by her arthritis in her feet, but she hopes to be able to walk more comfortably with her new orthotics/shoes.     3.  Morbid obesity:  BMI is 43.69.  Patients with increased BMI have worse breast cancer outcomes.  Weight is overall stable to slightly increased since last visit.  She has decreased mobility due to severe osteoarthritis of the feet. We discussed that weight loss may improve the pain in her feet. We discussed dietary changes are very important in reducing weight.  We also discussed the option for glucagon-peptide like receptor agonists such as semaglutide and liraglutide in weight loss. She is not interested in these medications right now. We encouraged participation in a program such as weight watchers.    4.  Hypothyroidism:  She declines treatment with levothyroxine and instead has been taking supplements advised by her sister who is a homeopath.  TSH checked in February 2023 and within normal limits.    5.  Follow Up:  Labs and return visit in 6 months.    Patient was seen and discussed with 4th year medical student, Anayeli Smart.  The medical student was personally supervised by me during the patient examination. I personally verified the medical history, performed a physical exam and the medical decision-making. I made appropriate changes to the documentation and the assessment and plan based on my verification, exam, and medical  decision making.  I personally spent 35 minutes on the date of the encounter doing chart review, review of test results, interpretation of tests, patient visit and documentation.     Kanchan Patel MD

## 2023-08-28 NOTE — NURSING NOTE
Chief Complaint   Patient presents with    Blood Draw     Vitals, blood drawn via VPT by LPN. Pt checked into appt.      LYNN Peña LPN

## 2023-11-25 ENCOUNTER — HEALTH MAINTENANCE LETTER (OUTPATIENT)
Age: 68
End: 2023-11-25

## 2024-02-17 NOTE — PROGRESS NOTES
MEDICAL ONCOLOGY FOLLOW-UP PATIENT VISIT:    NAME: Remedios Osorio     DATE: 2/19/2024    PRIMARY CARE PHYSICIAN: Kae Caal    PATIENT ID: Clinical Stage II-B Breast Cancer    Oncology History: Remedios Osorio is a 68 year old female with a h/o stage IIIa, [id number], ER positive, DE positive, HER2 non-amplified invasive ductal carcinoma of the left breast. Her PCP palpated a mass in the lower outer left breast in 01/2016. Diagnostic mammogram and ultrasound showed a 3.3 cm mass at 4:30, 8 cm from the nipple. She was also found to have multiple abnormal left axillary lymph nodes. The largest one was 2.4 cm. Biopsy of her left breast mass demonstrated invasive ductal carcinoma, grade 3 with extensive tumor necrosis. Axillary LN biopsy was also stim and the other is called GRA forte.  Positive for malignancy. The tumor cells were strongly positive for estrogen receptor (> 95%) and moderate to strongly positive for progesterone receptor (60-70%). HER2/lizz was non-amplified by FISH.  PET/CT showed the left breast mass, 5 hypermetabolic left axillary lymph nodes, indeterminate left cervical chain lymph nodes, and an indeterminate hypodensity in the dome of the liver. She received 11 cycles of weekly Taxol, the 12th was cancelled due to neuropathy. She received one cycle of ddAC but did not tolerate it due to fatigue and generalized weakness. She declined further chemotherapy.    Left breast lumpectomy and left axillary lymph node dissection was performed by Dr. Amezcua on 6/29/16.  Pathology showed residual 1.6 cm grade 2, IDC in the left breast.  Surgical margins were negative.  Invasive tumor cellularity of 30%.  Tumor infiltrating lymphocytes were estimated at 50%.  6/19 excised lymph nodes were involved with malignancy.  The largest lymph node metastasis measured 3.9 cm and had a 1 mm area of extranodal extension.  Minimal treatment related changes were noted in the lymph nodes.  Pathologic staging was [id number],  or stage IIIa.  [facility] residual cancer burden was Class III.  Adjuvant Xeloda was recommended, she declined.  She completed radiation on 10/17/16.  She declined zometa for prevention of bone metastases.  She has been on letrozole since early 09/2016.    Interim History:   Ms. Osorio comes into clinic today for routine breast cancer follow-up.  She continues on treatment with letrozole and is tolerating the medication well.  She states in general, she has been in good health over the last 6 months.  She notes some fatigue.  She has had a lot of house guests.  A niece of hers is going to school at the [employer].  The niece lives with her.  Another nephew of herlinda is considering starting there next year.  He may move in with her as well.  Her sister visits her often.  She states she enjoys having the house guests as it gives her people to talk to.  She also lives with a very elderly dog.  He is 16 years old and has numerous medical issues.  She is certain that he will not make the year and she is worried that his death will be hard on her.      She denies concerning breast lumps, pain, or swelling.  She notes intermittent pain under her right arm.  She also notes intermittent left breast pain.  She wonders if she has left breast lymphedema at the moment.  She has no current cough, shortness of breath, or chest pain.  She denies abdominal complaints.  She again mentions that she is motivated to lose weight.  She states she is going to try a PDH diet which is a combination of a Mediterranean and keto diet.  She remains uninterested in medication for weight loss.  She has no new bone or joint aches or pains.  She notes that her left foot has been bothering her again and she is going to follow-up with podiatry regarding this.    PAST MEDICAL HISTORY:   Past Medical History:   Diagnosis Date    ABNL LIVER FUNC STUDY  W/ MONO  5/01    Breast cancer (H)     CHR BLOOD LOSS ANEMIA DUE TO FIBROIDS 6/9/2005     ELEVATED HBA1C 6.2% 6/05 8/6/2005    HX MUCOUS POLYPS OF CERVIX  2000    HYPERLIPIDEMIA      Infectious mononucleosis 5/01    MENORRHAGIA     MIGRAINE HEADACHES     MORBID OBESITY BMI 44.79 6/05 6/12/2005    UTERINE LEIOMYOMA  2/7/2003       PAST SURGICAL HISTORY:  Past Surgical History:   Procedure Laterality Date    CL AFF SURGICAL PATHOLOGY  2005    UTERINE LEIOMYOMA  [D25.9]    HC BIOPSY/EXCIS CERVICAL LESION W/WO FULGURATION  08-15-00,12-    endocervical polypectomy    HC TOOTH EXTRACTION W/FORCEP      wisdom teeth    LUMPECTOMY BREAST WITH SENTINEL NODE, COMBINED Left 6/29/2016    Segmental mastectomy with axillary lymph node dissection    REMOVE PORT VASCULAR ACCESS Right 6/29/2016    Procedure: REMOVE PORT VASCULAR ACCESS;  Surgeon: Gianna Amezcua MD;  Location:  OR     MEDS:  Current Outpatient Medications   Medication    Calcium-Magnesium-Vitamin D (CALCIUM MAGNESIUM PO)    diclofenac (VOLTAREN) 1 % topical gel    fluocinonide (LIDEX) 0.05 % external cream    letrozole (FEMARA) 2.5 MG tablet    melatonin 3 MG CAPS    Multiple Minerals-Vitamins (CALCIUM CITRATE PLUS/MAGNESIUM PO)    order for DME    UNABLE TO FIND    UNABLE TO FIND    VITAMIN D PO     No current facility-administered medications for this visit.     Facility-Administered Medications Ordered in Other Visits   Medication    lidocaine BUFFERED 1 % solution 10 mL       ALLERGIES:  Allergies   Allergen Reactions    Benadryl [Diphenhydramine] Other (See Comments)     Pt had severe hypotension, nausea and vasovagal type reaction to IV Benadryl.   Give PO only.     Cats     Keflex [Cephalexin]      rash        PHYSICAL EXAM:  BP (!) 156/95 (BP Location: Right arm, Patient Position: Sitting, Cuff Size: Adult Large)   Pulse 96   Temp 98.6  F (37  C) (Oral)   Resp 18   Wt 112.4 kg (247 lb 12.8 oz)   LMP 10/28/2009   SpO2 97%   BMI 44.35 kg/m    LMP 10/28/2009   General:  Morbidly obese adult female in NAD.  Alert and oriented  x 3.  HEENT:  Normocephalic.  Sclera anicteric.  MMM.  Lymph:  No palpable cervical, supraclavicular, or axillary LAD.  CV: RRR, no audible m/r/g.  Resp: CTAB, normal work of breathing on room air   Breast:  Bilateral breasts are large and of normal fibroglandular density.  There are no discretely palpable masses in either breast.  Left breast with enhancement of pores and skin thickening c/w lymphedema.  This is most prominent over the inferior aspect of the breast.  Bilateral nipples are everted and without discharge.  Ext:  No pitting edema of the bilateral lower extremities.    Musculo:  Full ROM of the bilateral upper extremities.  Neuro:  Cranial nerves are grossly intact.  Gait is stable.  Able to climb on the exam table unaided.  Psych:  Mood and affect appear normal.  Skin:  No visible concerning skin rashes or lesions.    LABS REVIEWED THIS VISIT:  I personally reviewed the below laboratories:    2/19/2024 Labs:  Electrolytes, kidney, and liver tests are wnl.  TSH is low at 0.07 uIU/mL  Free T4 is wnl at 1.01 ng/dL  Blood counts are wnl.    IMPRESSION/PLAN: Ms. Osorio is a 68 year old female with a h/o stage IIIa, [id number], ER/AK positive, HER2 negative left breast cancer. She is s/p 11 weeks of weekly taxol, 1 cycle of ddAC, left breast lumpectomy, ALND, and adjuvant radiation.  She has been on curative intent adjuvant treatment with letrozole since early September, 2016.      1.  Stage III breast cancer:  Remedios is 7 years, 8 months out from excision of a left breast cancer.  She is on adjuvant curative intent treatment with letrozole.  She is tolerating the medication well.  We plan to continue letrozole to complete a total 10 years of endocrine therapy (through 09/2026).  However, according to the EBCTCG meta-analysis published in the NEJ in 2017 had she stopped her endocrine therapy at 5 years, her risk of recurrence between years 5-20 would have been approximately 49%.  According to MA17.R, 10 years of  AI will reduce this risk by 1/3, making her risk still around 33-34%.  Given this high risk of recurrence it would be reasonable to stay on AI longer than 10 years if bone health allows.    She is asymptomatic of disease recurrence on history today and clinical exam is without concerning findings. There is specifically no palpable lymph nodes in the right axilla in the area of patient reported intermittent discomfort. I will see her back in 6 months.    2.  Bone Health:  She is at risk for bone loss on aromatase inhibitor therapy.  DEXA 02/2023 with a lowest T-score of 0 in the left femoral neck.     She is s/p treatment with 2 years Zometa from 09/2019 - 10/2021, she did not tolerate the medication with significant joint pains following each dose.  She is taking a vitamin D supplement.    3.  Morbid obesity:  BMI today is 44.35.  Patients with increased BMI have worse breast cancer outcomes.  She has decreased mobility due to severe osteoarthritis of the feet. We discussed 80% of weight loss is dietary.  She has previously declined referral to the medication for weight loss clinic and again declines this today.  She states she plans to try a PDH diet..     4.  Hypothyroidism:  She declines treatment with levothyroxine and instead has been taking supplements advised by her sister who is a homeopath.  TSH and free T4 today are c/w clinical hyperthyroidism.      5.  Left breast lymphedema:  Recommend wearing a compressive bra as much as able.  She confirms she has one that fits.    6.  Follow Up:  Labs and return visit in 6 months.    I spent 37 minutes on the date of the encounter doing chart review, review of test results, interpretation of tests, patient visit, and documentation.

## 2024-02-19 ENCOUNTER — ONCOLOGY VISIT (OUTPATIENT)
Dept: ONCOLOGY | Facility: CLINIC | Age: 69
End: 2024-02-19
Attending: INTERNAL MEDICINE
Payer: COMMERCIAL

## 2024-02-19 ENCOUNTER — APPOINTMENT (OUTPATIENT)
Dept: LAB | Facility: CLINIC | Age: 69
End: 2024-02-19
Attending: INTERNAL MEDICINE
Payer: COMMERCIAL

## 2024-02-19 VITALS
SYSTOLIC BLOOD PRESSURE: 156 MMHG | OXYGEN SATURATION: 97 % | RESPIRATION RATE: 18 BRPM | WEIGHT: 247.8 LBS | BODY MASS INDEX: 44.35 KG/M2 | HEART RATE: 96 BPM | TEMPERATURE: 98.6 F | DIASTOLIC BLOOD PRESSURE: 95 MMHG

## 2024-02-19 DIAGNOSIS — E66.01 MORBID OBESITY (H): ICD-10-CM

## 2024-02-19 DIAGNOSIS — Z92.21 STATUS POST CHEMOTHERAPY: ICD-10-CM

## 2024-02-19 DIAGNOSIS — Z17.0 MALIGNANT NEOPLASM OF LOWER-OUTER QUADRANT OF LEFT BREAST OF FEMALE, ESTROGEN RECEPTOR POSITIVE (H): Primary | ICD-10-CM

## 2024-02-19 DIAGNOSIS — I89.0 LYMPHEDEMA OF BREAST: ICD-10-CM

## 2024-02-19 DIAGNOSIS — E06.3 HYPOTHYROIDISM DUE TO HASHIMOTO'S THYROIDITIS: ICD-10-CM

## 2024-02-19 DIAGNOSIS — C50.512 MALIGNANT NEOPLASM OF LOWER-OUTER QUADRANT OF LEFT BREAST OF FEMALE, ESTROGEN RECEPTOR POSITIVE (H): Primary | ICD-10-CM

## 2024-02-19 LAB
ALBUMIN SERPL BCG-MCNC: 4.1 G/DL (ref 3.5–5.2)
ALP SERPL-CCNC: 88 U/L (ref 40–150)
ALT SERPL W P-5'-P-CCNC: 15 U/L (ref 0–50)
ANION GAP SERPL CALCULATED.3IONS-SCNC: 9 MMOL/L (ref 7–15)
AST SERPL W P-5'-P-CCNC: 19 U/L (ref 0–45)
BASOPHILS # BLD AUTO: 0 10E3/UL (ref 0–0.2)
BASOPHILS NFR BLD AUTO: 1 %
BILIRUB SERPL-MCNC: 0.2 MG/DL
BUN SERPL-MCNC: 17 MG/DL (ref 8–23)
CALCIUM SERPL-MCNC: 9.6 MG/DL (ref 8.8–10.2)
CHLORIDE SERPL-SCNC: 102 MMOL/L (ref 98–107)
CREAT SERPL-MCNC: 0.67 MG/DL (ref 0.51–0.95)
DEPRECATED HCO3 PLAS-SCNC: 26 MMOL/L (ref 22–29)
EGFRCR SERPLBLD CKD-EPI 2021: >90 ML/MIN/1.73M2
EOSINOPHIL # BLD AUTO: 0.2 10E3/UL (ref 0–0.7)
EOSINOPHIL NFR BLD AUTO: 3 %
ERYTHROCYTE [DISTWIDTH] IN BLOOD BY AUTOMATED COUNT: 14.4 % (ref 10–15)
GLUCOSE SERPL-MCNC: 103 MG/DL (ref 70–99)
HCT VFR BLD AUTO: 38.8 % (ref 35–47)
HGB BLD-MCNC: 12.9 G/DL (ref 11.7–15.7)
IMM GRANULOCYTES # BLD: 0 10E3/UL
IMM GRANULOCYTES NFR BLD: 0 %
LYMPHOCYTES # BLD AUTO: 2 10E3/UL (ref 0.8–5.3)
LYMPHOCYTES NFR BLD AUTO: 34 %
MCH RBC QN AUTO: 29.7 PG (ref 26.5–33)
MCHC RBC AUTO-ENTMCNC: 33.2 G/DL (ref 31.5–36.5)
MCV RBC AUTO: 89 FL (ref 78–100)
MONOCYTES # BLD AUTO: 0.7 10E3/UL (ref 0–1.3)
MONOCYTES NFR BLD AUTO: 11 %
NEUTROPHILS # BLD AUTO: 3 10E3/UL (ref 1.6–8.3)
NEUTROPHILS NFR BLD AUTO: 51 %
NRBC # BLD AUTO: 0 10E3/UL
NRBC BLD AUTO-RTO: 0 /100
PLATELET # BLD AUTO: 257 10E3/UL (ref 150–450)
POTASSIUM SERPL-SCNC: 5.3 MMOL/L (ref 3.4–5.3)
PROT SERPL-MCNC: 7.3 G/DL (ref 6.4–8.3)
RBC # BLD AUTO: 4.34 10E6/UL (ref 3.8–5.2)
SODIUM SERPL-SCNC: 137 MMOL/L (ref 135–145)
T4 FREE SERPL-MCNC: 1.01 NG/DL (ref 0.9–1.7)
TSH SERPL DL<=0.005 MIU/L-ACNC: 0.07 UIU/ML (ref 0.3–4.2)
WBC # BLD AUTO: 5.9 10E3/UL (ref 4–11)

## 2024-02-19 PROCEDURE — 84439 ASSAY OF FREE THYROXINE: CPT | Performed by: INTERNAL MEDICINE

## 2024-02-19 PROCEDURE — 99213 OFFICE O/P EST LOW 20 MIN: CPT | Performed by: INTERNAL MEDICINE

## 2024-02-19 PROCEDURE — 85025 COMPLETE CBC W/AUTO DIFF WBC: CPT | Performed by: INTERNAL MEDICINE

## 2024-02-19 PROCEDURE — 36415 COLL VENOUS BLD VENIPUNCTURE: CPT | Performed by: INTERNAL MEDICINE

## 2024-02-19 PROCEDURE — 99214 OFFICE O/P EST MOD 30 MIN: CPT | Performed by: INTERNAL MEDICINE

## 2024-02-19 PROCEDURE — 84443 ASSAY THYROID STIM HORMONE: CPT | Performed by: INTERNAL MEDICINE

## 2024-02-19 PROCEDURE — 82040 ASSAY OF SERUM ALBUMIN: CPT | Performed by: INTERNAL MEDICINE

## 2024-02-19 NOTE — NURSING NOTE
"Oncology Rooming Note    February 19, 2024 3:27 PM   Remedios Osorio is a 68 year old female who presents for:    Chief Complaint   Patient presents with    Blood Draw     Labs drawn via  by RN in lab    Oncology Clinic Visit     Malignant Neoplasm of Left Breast     Initial Vitals: BP (!) 156/95 (BP Location: Right arm, Patient Position: Sitting, Cuff Size: Adult Large)   Pulse 96   Temp 98.6  F (37  C) (Oral)   Resp 18   Wt 112.4 kg (247 lb 12.8 oz)   LMP 10/28/2009   SpO2 97%   BMI 44.35 kg/m   Estimated body mass index is 44.35 kg/m  as calculated from the following:    Height as of 8/16/21: 1.592 m (5' 2.68\").    Weight as of this encounter: 112.4 kg (247 lb 12.8 oz). Body surface area is 2.23 meters squared.  Data Unavailable Comment: Data Unavailable   Patient's last menstrual period was 10/28/2009.  Allergies reviewed: Yes  Medications reviewed: Yes    Medications: Medication refills not needed today.  Pharmacy name entered into [employer]:    [facility] - [place], [place] - [address] [facility].  [id number] - [place], [place] [address] [facility] [address] [place], [place] [address] [facility] [place] [address] [facility] [place], [place] [address] [facility] [place], [place] [address]  [facility] [place], [place] - [address] [facility] [place], [place] - [address] [facility] [address] [place], [place] - [address] [facility]    Frailty Screening:   Is the patient here for a new oncology consult visit in cancer care? 2. No      Clinical concerns: None       Shanelle Bronson LPN  2/19/2024                "

## 2024-02-19 NOTE — NURSING NOTE
Chief Complaint   Patient presents with    Blood Draw     Labs drawn via  by RN in lab     Labs collected from venipuncture by RN. Vitals taken. Checked in for appointment(s).   Irene Batres RN

## 2024-02-19 NOTE — LETTER
2/19/2024         RE: Remedios Osorio  [address]  [facility] [id number]        Dear Colleague,    Thank you for referring your patient, Remedios Osorio, to the [facility]. Please see a copy of my visit note below.    MEDICAL ONCOLOGY FOLLOW-UP PATIENT VISIT:    NAME: Remedios Osorio     DATE: 2/19/2024    PRIMARY CARE PHYSICIAN: Kae Caal    PATIENT ID: Clinical Stage II-B Breast Cancer    Oncology History: Remedios Osorio is a 68 year old female with a h/o stage IIIa, H2eW3gR2, ER positive, NM positive, HER2 non-amplified invasive ductal carcinoma of the left breast. Her PCP palpated a mass in the lower outer left breast in 01/2016. Diagnostic mammogram and ultrasound showed a 3.3 cm mass at 4:30, 8 cm from the nipple. She was also found to have multiple abnormal left axillary lymph nodes. The largest one was 2.4 cm. Biopsy of her left breast mass demonstrated invasive ductal carcinoma, grade 3 with extensive tumor necrosis. Axillary LN biopsy was also stim and the other is called GRA forte.  Positive for malignancy. The tumor cells were strongly positive for estrogen receptor (> 95%) and moderate to strongly positive for progesterone receptor (60-70%). HER2/lizz was non-amplified by FISH.  PET/CT showed the left breast mass, 5 hypermetabolic left axillary lymph nodes, indeterminate left cervical chain lymph nodes, and an indeterminate hypodensity in the dome of the liver. She received 11 cycles of weekly Taxol, the 12th was cancelled due to neuropathy. She received one cycle of ddAC but did not tolerate it due to fatigue and generalized weakness. She declined further chemotherapy.    Left breast lumpectomy and left axillary lymph node dissection was performed by Dr. Amezcua on 6/29/16.  Pathology showed residual 1.6 cm grade 2, IDC in the left breast.  Surgical margins were negative.  Invasive tumor cellularity of 30%.  Tumor infiltrating lymphocytes were estimated  at 50%.  6/19 excised lymph nodes were involved with malignancy.  The largest lymph node metastasis measured 3.9 cm and had a 1 mm area of extranodal extension.  Minimal treatment related changes were noted in the lymph nodes.  Pathologic staging was A0sN4C8, or stage IIIa.  [facility] residual cancer burden was Class III.  Adjuvant Xeloda was recommended, she declined.  She completed radiation on 10/17/16.  She declined zometa for prevention of bone metastases.  She has been on letrozole since early 09/2016.    Interim History:   Ms. Osorio comes into clinic today for routine breast cancer follow-up.  She continues on treatment with letrozole and is tolerating the medication well.  She states in general, she has been in good health over the last 6 months.  She notes some fatigue.  She has had a lot of house guests.  A niece of hers is going to school at the [employer].  The niece lives with her.  Another nephew of hers is considering starting there next year.  He may move in with her as well.  Her sister visits her often.  She states she enjoys having the house guests as it gives her people to talk to.  She also lives with a very elderly dog.  He is 16 years old and has numerous medical issues.  She is certain that he will not make the year and she is worried that his death will be hard on her.      She denies concerning breast lumps, pain, or swelling.  She notes intermittent pain under her right arm.  She also notes intermittent left breast pain.  She wonders if she has left breast lymphedema at the moment.  She has no current cough, shortness of breath, or chest pain.  She denies abdominal complaints.  She again mentions that she is motivated to lose weight.  She states she is going to try a PDH diet which is a combination of a Mediterranean and keto diet.  She remains uninterested in medication for weight loss.  She has no new bone or joint aches or pains.  She notes that her left foot has been  bothering her again and she is going to follow-up with podiatry regarding this.    PAST MEDICAL HISTORY:   Past Medical History:   Diagnosis Date    ABNL LIVER FUNC STUDY  W/ MONO  5/01    Breast cancer (H)     CHR BLOOD LOSS ANEMIA DUE TO FIBROIDS 6/9/2005    ELEVATED HBA1C 6.2% 6/05 8/6/2005    HX MUCOUS POLYPS OF CERVIX  2000    HYPERLIPIDEMIA      Infectious mononucleosis 5/01    MENORRHAGIA     MIGRAINE HEADACHES     MORBID OBESITY BMI 44.79 6/05 6/12/2005    UTERINE LEIOMYOMA  2/7/2003       PAST SURGICAL HISTORY:  Past Surgical History:   Procedure Laterality Date    CL AFF SURGICAL PATHOLOGY  2005    UTERINE LEIOMYOMA  [D25.9]    HC BIOPSY/EXCIS CERVICAL LESION W/WO FULGURATION  08-15-00,12-    endocervical polypectomy    HC TOOTH EXTRACTION W/FORCEP      wisdom teeth    LUMPECTOMY BREAST WITH SENTINEL NODE, COMBINED Left 6/29/2016    Segmental mastectomy with axillary lymph node dissection    REMOVE PORT VASCULAR ACCESS Right 6/29/2016    Procedure: REMOVE PORT VASCULAR ACCESS;  Surgeon: Gianna Amezcua MD;  Location:  OR     MEDS:  Current Outpatient Medications   Medication    Calcium-Magnesium-Vitamin D (CALCIUM MAGNESIUM PO)    diclofenac (VOLTAREN) 1 % topical gel    fluocinonide (LIDEX) 0.05 % external cream    letrozole (FEMARA) 2.5 MG tablet    melatonin 3 MG CAPS    Multiple Minerals-Vitamins (CALCIUM CITRATE PLUS/MAGNESIUM PO)    order for DME    UNABLE TO FIND    UNABLE TO FIND    VITAMIN D PO     No current facility-administered medications for this visit.     Facility-Administered Medications Ordered in Other Visits   Medication    lidocaine BUFFERED 1 % solution 10 mL       ALLERGIES:  Allergies   Allergen Reactions    Benadryl [Diphenhydramine] Other (See Comments)     Pt had severe hypotension, nausea and vasovagal type reaction to IV Benadryl.   Give PO only.     Cats     Keflex [Cephalexin]      rash        PHYSICAL EXAM:  BP (!) 156/95 (BP Location: Right arm, Patient  Position: Sitting, Cuff Size: Adult Large)   Pulse 96   Temp 98.6  F (37  C) (Oral)   Resp 18   Wt 112.4 kg (247 lb 12.8 oz)   LMP 10/28/2009   SpO2 97%   BMI 44.35 kg/m    LMP 10/28/2009   General:  Morbidly obese adult female in NAD.  Alert and oriented x 3.  HEENT:  Normocephalic.  Sclera anicteric.  MMM.  Lymph:  No palpable cervical, supraclavicular, or axillary LAD.  CV: RRR, no audible m/r/g.  Resp: CTAB, normal work of breathing on room air   Breast:  Bilateral breasts are large and of normal fibroglandular density.  There are no discretely palpable masses in either breast.  Left breast with enhancement of pores and skin thickening c/w lymphedema.  This is most prominent over the inferior aspect of the breast.  Bilateral nipples are everted and without discharge.  Ext:  No pitting edema of the bilateral lower extremities.    Musculo:  Full ROM of the bilateral upper extremities.  Neuro:  Cranial nerves are grossly intact.  Gait is stable.  Able to climb on the exam table unaided.  Psych:  Mood and affect appear normal.  Skin:  No visible concerning skin rashes or lesions.    LABS REVIEWED THIS VISIT:  I personally reviewed the below laboratories:    2/19/2024 Labs:  Electrolytes, kidney, and liver tests are wnl.  TSH is low at 0.07 uIU/mL  Free T4 is wnl at 1.01 ng/dL  Blood counts are wnl.    IMPRESSION/PLAN: Ms. Osorio is a 68 year old female with a h/o stage IIIa, L8nT1G2, ER/IA positive, HER2 negative left breast cancer. She is s/p 11 weeks of weekly taxol, 1 cycle of ddAC, left breast lumpectomy, ALND, and adjuvant radiation.  She has been on curative intent adjuvant treatment with letrozole since early September, 2016.      1.  Stage III breast cancer:  Remedios is 7 years, 8 months out from excision of a left breast cancer.  She is on adjuvant curative intent treatment with letrozole.  She is tolerating the medication well.  We plan to continue letrozole to complete a total 10 years of endocrine  therapy (through 09/2026).  However, according to the EBCTCG meta-analysis published in the NEJ in 2017 had she stopped her endocrine therapy at 5 years, her risk of recurrence between years 5-20 would have been approximately 49%.  According to MA17.R, 10 years of AI will reduce this risk by 1/3, making her risk still around 33-34%.  Given this high risk of recurrence it would be reasonable to stay on AI longer than 10 years if bone health allows.    She is asymptomatic of disease recurrence on history today and clinical exam is without concerning findings. There is specifically no palpable lymph nodes in the right axilla in the area of patient reported intermittent discomfort. I will see her back in 6 months.    2.  Bone Health:  She is at risk for bone loss on aromatase inhibitor therapy.  DEXA 02/2023 with a lowest T-score of 0 in the left femoral neck.     She is s/p treatment with 2 years Zometa from 09/2019 - 10/2021, she did not tolerate the medication with significant joint pains following each dose.  She is taking a vitamin D supplement.    3.  Morbid obesity:  BMI today is 44.35.  Patients with increased BMI have worse breast cancer outcomes.  She has decreased mobility due to severe osteoarthritis of the feet. We discussed 80% of weight loss is dietary.  She has previously declined referral to the medication for weight loss clinic and again declines this today.  She states she plans to try a PDH diet..     4.  Hypothyroidism:  She declines treatment with levothyroxine and instead has been taking supplements advised by her sister who is a homeopath.  TSH and free T4 today are c/w clinical hyperthyroidism.      5.  Left breast lymphedema:  Recommend wearing a compressive bra as much as able.  She confirms she has one that fits.    6.  Follow Up:  Labs and return visit in 6 months.    I spent 37 minutes on the date of the encounter doing chart review, review of test results, interpretation of tests, patient  visit, and documentation.             Kanchan Patel MD

## 2024-04-09 ENCOUNTER — NURSE TRIAGE (OUTPATIENT)
Dept: ONCOLOGY | Facility: CLINIC | Age: 69
End: 2024-04-09
Payer: COMMERCIAL

## 2024-04-09 DIAGNOSIS — Z17.0 MALIGNANT NEOPLASM OF LOWER-OUTER QUADRANT OF LEFT BREAST OF FEMALE, ESTROGEN RECEPTOR POSITIVE (H): ICD-10-CM

## 2024-04-09 DIAGNOSIS — C50.512 MALIGNANT NEOPLASM OF LOWER-OUTER QUADRANT OF LEFT BREAST OF FEMALE, ESTROGEN RECEPTOR POSITIVE (H): ICD-10-CM

## 2024-04-09 RX ORDER — LETROZOLE 2.5 MG/1
TABLET, FILM COATED ORAL
Qty: 90 TABLET | Refills: 3 | Status: SHIPPED | OUTPATIENT
Start: 2024-04-09

## 2024-04-09 NOTE — TELEPHONE ENCOUNTER
"Letrozole 2.5mg tablet  Last prescribing provider: Dr. Kanchan Patel    Last clinic visit date: 2/19/24     Recommendations for requested medication (if none, N/A): 2/19/24, \"She is on adjuvant curative intent treatment with letrozole.  She is tolerating the medication well.  We plan to continue letrozole to complete a total 10 years of endocrine therapy (through 09/2026)\"    Any other pertinent information (if none, N/A): NA    Refilled: Y/N, if NO, why?    "

## 2024-07-09 DIAGNOSIS — Z12.31 ENCOUNTER FOR SCREENING MAMMOGRAM FOR BREAST CANCER: Primary | ICD-10-CM

## 2024-09-13 NOTE — PROGRESS NOTES
MEDICAL ONCOLOGY FOLLOW-UP PATIENT VISIT:    NAME: Remedios Osorio     DATE: Sep 16, 2024    PRIMARY CARE PHYSICIAN: Kae Caal    PATIENT ID: Clinical Stage II-B Breast Cancer    Oncology History: Remedios Osorio is a 68 year old female with a h/o stage IIIa, [id number], ER positive, UT positive, HER2 non-amplified invasive ductal carcinoma of the left breast. Her PCP palpated a mass in the lower outer left breast in 01/2016. Diagnostic mammogram and ultrasound showed a 3.3 cm mass at 4:30, 8 cm from the nipple. She was also found to have multiple abnormal left axillary lymph nodes. The largest one was 2.4 cm. Biopsy of her left breast mass demonstrated invasive ductal carcinoma, grade 3 with extensive tumor necrosis. Axillary LN biopsy was also stim and the other is called GRA forte.  Positive for malignancy. The tumor cells were strongly positive for estrogen receptor (> 95%) and moderate to strongly positive for progesterone receptor (60-70%). HER2/lizz was non-amplified by FISH.  PET/CT showed the left breast mass, 5 hypermetabolic left axillary lymph nodes, indeterminate left cervical chain lymph nodes, and an indeterminate hypodensity in the dome of the liver. She received 11 cycles of weekly Taxol, the 12th was cancelled due to neuropathy. She received one cycle of ddAC but did not tolerate it due to fatigue and generalized weakness. She declined further chemotherapy.    Left breast lumpectomy and left axillary lymph node dissection was performed by Dr. Amezcua on 6/29/16.  Pathology showed residual 1.6 cm grade 2, IDC in the left breast.  Surgical margins were negative.  Invasive tumor cellularity of 30%.  Tumor infiltrating lymphocytes were estimated at 50%.  6/19 excised lymph nodes were involved with malignancy.  The largest lymph node metastasis measured 3.9 cm and had a 1 mm area of extranodal extension.  Minimal treatment related changes were noted in the lymph nodes.  Pathologic staging was  S8tT8M5, or stage IIIa.  [facility] residual cancer burden was Class III.  Adjuvant Xeloda was recommended, she declined.  She completed radiation on 10/17/16.  She declined zometa for prevention of bone metastases.  She has been on letrozole since early 09/2016.    Interim History:   Remedios returns clinic today for routine breast cancer follow-up.  She reports that she is doing okay.  Sadly she had to put her dog down in May and this has been very difficult for her.  She also is stressed at work.  She endorses fatigue which she suggests is likely multifactorial and in part to grief.  She continues to work with [employer] and is on several supplementations.  She takes supplements for her thyroid-GTA-forte for thyroid. Has upped from 2 pills to 4 pills in the past month. Detoxidine iodine 3 drops under the tongue.  She is taking [employer] A[employer] and melatonin for sleep.  Additionally she continues on vitamin C, vitamin D, calcium, magnesium, and vitamin B.  She does not have any particularly new concerns for lumps or bumps in her breast but does notices the warmer weather her lymphedema seems to be increasing.  She is not wearing her compression garments regularly.  She reports that she has gained weight.  She is not interested in any injectables or referrals to weight management clinic.  She is not very active outside of work where she walks 2 laps during her breaks.    Her niece continues to live with her for college.  She has no new bone pains or aches.  Her left foot has been improved with injections.  She has mild arthralgias in her knees. No nausea or vomiting. No bowel or bladder concerns. Endorses hot flashes. Endorses vaginal dryness but does not find it particularly bothersome.     ROS: 10 point ROS neg other than the symptoms noted above in the HPI.      PAST MEDICAL HISTORY:   Past Medical History:   Diagnosis Date    ABNL LIVER FUNC STUDY  W/ MONO  5/01    Breast cancer (H)     CHR BLOOD LOSS ANEMIA  DUE TO FIBROIDS 6/9/2005    ELEVATED HBA1C 6.2% 6/05 8/6/2005    HX MUCOUS POLYPS OF CERVIX  2000    HYPERLIPIDEMIA      Infectious mononucleosis 5/01    MENORRHAGIA     MIGRAINE HEADACHES     MORBID OBESITY BMI 44.79 6/05 6/12/2005    UTERINE LEIOMYOMA  2/7/2003       PAST SURGICAL HISTORY:  Past Surgical History:   Procedure Laterality Date    CL AFF SURGICAL PATHOLOGY  2005    UTERINE LEIOMYOMA  [D25.9]    HC BIOPSY/EXCIS CERVICAL LESION W/WO FULGURATION  08-15-00,12-    endocervical polypectomy    HC TOOTH EXTRACTION W/FORCEP      wisdom teeth    LUMPECTOMY BREAST WITH SENTINEL NODE, COMBINED Left 6/29/2016    Segmental mastectomy with axillary lymph node dissection    REMOVE PORT VASCULAR ACCESS Right 6/29/2016    Procedure: REMOVE PORT VASCULAR ACCESS;  Surgeon: Gianna Amezcua MD;  Location:  OR     MEDS:  Current Outpatient Medications   Medication Sig Dispense Refill    Calcium-Magnesium-Vitamin D (CALCIUM MAGNESIUM PO) Take 1 tablet by mouth daily      diclofenac (VOLTAREN) 1 % topical gel Apply 2 g topically 4 times daily 100 g 3    fluocinonide (LIDEX) 0.05 % external cream Apply topically 2 times daily To hands as needed with daily moisturizer 60 g 1    letrozole (FEMARA) 2.5 MG tablet TAKE ONE TABLET BY MOUTH ONCE DAILY 90 tablet 3    melatonin 3 MG CAPS Take 3 mg by mouth at bedtime      Multiple Minerals-Vitamins (CALCIUM CITRATE PLUS/MAGNESIUM PO) 1scoop per day per patient      order for DME Equipment being ordered: Compression sleeve, glove, and bra (Patient not taking: Reported on 8/28/2023) 1 Device 3    UNABLE TO FIND Take 2 capsules by mouth daily MEDICATION NAME: GTA-Forte      UNABLE TO FIND Take 2 capsules by mouth daily MEDICATION NAME: Marcelo-stim dietary supplement      VITAMIN D PO Take 1 tablet by mouth daily       No current facility-administered medications for this visit.     Facility-Administered Medications Ordered in Other Visits   Medication Dose Route Frequency  Provider Last Rate Last Admin    lidocaine BUFFERED 1 % solution 10 mL  10 mL Injection Once Kae Caal MD           ALLERGIES:  Allergies   Allergen Reactions    Benadryl [Diphenhydramine] Other (See Comments)     Pt had severe hypotension, nausea and vasovagal type reaction to IV Benadryl.   Give PO only.     Cats     Keflex [Cephalexin]      rash        PHYSICAL EXAM:  BP (!) 151/90   Pulse 101   Temp 98.9  F (37.2  C) (Oral)   Resp 16   Wt 115 kg (253 lb 8 oz)   LMP 10/28/2009   SpO2 95%   BMI 45.37 kg/m    LMP 10/28/2009   General: No acute distress  HEENT: Sclera anicteric. Oral mucosa pink and moist.  No mucositis or thrush  Lymph: No lymphadenopathy in neck or axilla.   Heart: Regular, rate, and rhythm  Lungs: Clear to ascultation bilaterally  Breast: Right breast without distinctly palpable masses or lesions. Left breast with increased pores most prominent across the inferior breast consistent with past exams. No distinctly palpable masses in the left breast. Bilateral nipples everted and without discharge.   Abdomen: Positive bowel sounds. Soft, non-distended, non-tender. No organomegaly or mass.   Extremities: no lower extremity edema  Neuro: Cranial nerves grossly intact  Rash: none      LABS REVIEWED THIS VISIT:  Most Recent 3 CBC's:  Recent Labs   Lab Test 09/16/24  1452 02/19/24  1510 08/28/23  1447   WBC 6.2 5.9 5.7   HGB 12.9 12.9 12.4   MCV 90 89 90    257 257   ANEUTAUTO 3.6 3.0 3.6     Most Recent 3 BMP's:  Recent Labs   Lab Test 09/16/24  1452 02/19/24  1510 08/28/23  1447    137 138   POTASSIUM 4.5 5.3 4.4   CHLORIDE 102 102 104   CO2 26 26 26   BUN 20.3 17.0 18.2   CR 0.66 0.67 0.78   ANIONGAP 10 9 8   ANAYELI 10.1 9.6 9.7   * 103* 114*   PROTTOTAL 7.4 7.3 6.9   ALBUMIN 4.2 4.1 4.2    Most Recent 3 LFT's:  Recent Labs   Lab Test 09/16/24  1452 02/19/24  1510 08/28/23  1447   AST 20 19 20   ALT 24 15 22   ALKPHOS 93 88 83   BILITOTAL 0.2 0.2 0.2    Most  Recent 2 TSH and T4:  Recent Labs   Lab Test 02/19/24  1510 02/27/23  1455 08/22/22  1530 02/21/22  1504   TSH 0.07* 0.38   < > 4.58*   T4 1.01  --   --  0.87    < > = values in this interval not displayed.     I reviewed the above labs today.      IMPRESSION/PLAN: Ms. Osorio is a 68 year old female with a h/o stage IIIa, L4mG1O0, ER/PA positive, HER2 negative left breast cancer. She is s/p 11 weeks of weekly taxol, 1 cycle of ddAC, left breast lumpectomy, ALND, and adjuvant radiation.  She has been on curative intent adjuvant treatment with letrozole since early September, 2016.      1.  Stage III breast cancer:  Remedios is 8 years, 0 months out from excision of a left breast cancer.  She is on adjuvant curative intent treatment with letrozole.  She is tolerating the medication well.  We plan to continue letrozole to complete a total 10 years of endocrine therapy (through 09/2026).  However, according to the EBCTCG meta-analysis published in the NEJ in 2017 had she stopped her endocrine therapy at 5 years, her risk of recurrence between years 5-20 would have been approximately 49%.  According to MA17.R, 10 years of AI will reduce this risk by 1/3, making her risk still around 33-34%.  Given this high risk of recurrence it would be reasonable to stay on AI longer than 10 years if bone health allows.    She is asymptomatic of disease recurrence on history today and clinical exam is without concerning findings. Examination of the left breast with ongoing lymphedema. We will see her back in 6 months with a visit with Dr. Patel at that time assuming the results of her mammogram from earlier today are benign. Results pending 9/16/24.     2.  Bone Health:  She is at risk for bone loss on aromatase inhibitor therapy.  DEXA 02/2023 with a lowest T-score of 0 in the left femoral neck.     She is s/p treatment with 2 years Zometa from 09/2019 - 10/2021, she did not tolerate the medication with significant joint pains  following each dose.  She is taking a vitamin D supplement. She is due for a repeat DEXA 2/2025, orders placed today to have completed prior to her 6 month follow up with Dr. Patel.     3.  Morbid obesity:  BMI today is 44.35.  Patients with increased BMI have worse breast cancer outcomes.  She has decreased mobility due to severe osteoarthritis of the feet. She has previously declined referral to the medication for weight loss clinic and again declines this today. She specifically is not interested in medication management. We discussed diet and exercise today 9/16/24.     4.  Hypothyroidism:  She previously has declined treatment with levothyroxine and instead has been taking supplements advised by her sister who is a homeopath.  TSH with reflex T4 pending today. Addendum TSH 0.03, consistent with hyperthyroidism. T4 pending.     5.  Left breast lymphedema:  Recommend wearing a compressive bra as much as able.  She confirms she has one that fits and will start wearing it. She is interested in a referral back to lymphedema PT. Will place referral.     6. Fatigue: we discussed likely multifactorial- grief, stress, ?depression, and possibly thyroid contributing. Declines mental health resources today.     7.  Follow Up:  Labs, DEXA, and return visit with Dr. Patel in 6 months.    40 minutes spent on the date of the encounter doing chart review, review of test results, interpretation of tests, patient visit, and documentation     Dana Cárdenas PA-C

## 2024-09-16 ENCOUNTER — ANCILLARY PROCEDURE (OUTPATIENT)
Dept: MAMMOGRAPHY | Facility: CLINIC | Age: 69
End: 2024-09-16
Attending: INTERNAL MEDICINE
Payer: COMMERCIAL

## 2024-09-16 ENCOUNTER — APPOINTMENT (OUTPATIENT)
Dept: LAB | Facility: CLINIC | Age: 69
End: 2024-09-16
Attending: INTERNAL MEDICINE
Payer: COMMERCIAL

## 2024-09-16 ENCOUNTER — ONCOLOGY VISIT (OUTPATIENT)
Dept: ONCOLOGY | Facility: CLINIC | Age: 69
End: 2024-09-16
Attending: INTERNAL MEDICINE
Payer: COMMERCIAL

## 2024-09-16 VITALS
OXYGEN SATURATION: 95 % | SYSTOLIC BLOOD PRESSURE: 151 MMHG | BODY MASS INDEX: 45.37 KG/M2 | HEART RATE: 101 BPM | TEMPERATURE: 98.9 F | RESPIRATION RATE: 16 BRPM | WEIGHT: 253.5 LBS | DIASTOLIC BLOOD PRESSURE: 90 MMHG

## 2024-09-16 DIAGNOSIS — C77.3 BREAST CANCER METASTASIZED TO AXILLARY LYMPH NODE, LEFT (H): ICD-10-CM

## 2024-09-16 DIAGNOSIS — C77.3 BREAST CANCER METASTASIZED TO AXILLARY LYMPH NODE, LEFT (H): Primary | ICD-10-CM

## 2024-09-16 DIAGNOSIS — Z12.31 ENCOUNTER FOR SCREENING MAMMOGRAM FOR BREAST CANCER: ICD-10-CM

## 2024-09-16 DIAGNOSIS — C50.912 BREAST CANCER METASTASIZED TO AXILLARY LYMPH NODE, LEFT (H): ICD-10-CM

## 2024-09-16 DIAGNOSIS — Z12.31 VISIT FOR SCREENING MAMMOGRAM: ICD-10-CM

## 2024-09-16 DIAGNOSIS — Z91.89 AT RISK FOR BONE DENSITY LOSS: Primary | ICD-10-CM

## 2024-09-16 DIAGNOSIS — C50.912 BREAST CANCER METASTASIZED TO AXILLARY LYMPH NODE, LEFT (H): Primary | ICD-10-CM

## 2024-09-16 LAB
ALBUMIN SERPL BCG-MCNC: 4.2 G/DL (ref 3.5–5.2)
ALP SERPL-CCNC: 93 U/L (ref 40–150)
ALT SERPL W P-5'-P-CCNC: 24 U/L (ref 0–50)
ANION GAP SERPL CALCULATED.3IONS-SCNC: 10 MMOL/L (ref 7–15)
AST SERPL W P-5'-P-CCNC: 20 U/L (ref 0–45)
BASOPHILS # BLD AUTO: 0 10E3/UL (ref 0–0.2)
BASOPHILS NFR BLD AUTO: 1 %
BILIRUB SERPL-MCNC: 0.2 MG/DL
BUN SERPL-MCNC: 20.3 MG/DL (ref 8–23)
CALCIUM SERPL-MCNC: 10.1 MG/DL (ref 8.8–10.4)
CHLORIDE SERPL-SCNC: 102 MMOL/L (ref 98–107)
CREAT SERPL-MCNC: 0.66 MG/DL (ref 0.51–0.95)
EGFRCR SERPLBLD CKD-EPI 2021: >90 ML/MIN/1.73M2
EOSINOPHIL # BLD AUTO: 0.2 10E3/UL (ref 0–0.7)
EOSINOPHIL NFR BLD AUTO: 3 %
ERYTHROCYTE [DISTWIDTH] IN BLOOD BY AUTOMATED COUNT: 13.6 % (ref 10–15)
GLUCOSE SERPL-MCNC: 110 MG/DL (ref 70–99)
HCO3 SERPL-SCNC: 26 MMOL/L (ref 22–29)
HCT VFR BLD AUTO: 38.4 % (ref 35–47)
HGB BLD-MCNC: 12.9 G/DL (ref 11.7–15.7)
IMM GRANULOCYTES # BLD: 0 10E3/UL
IMM GRANULOCYTES NFR BLD: 0 %
LYMPHOCYTES # BLD AUTO: 1.7 10E3/UL (ref 0.8–5.3)
LYMPHOCYTES NFR BLD AUTO: 27 %
MCH RBC QN AUTO: 30.3 PG (ref 26.5–33)
MCHC RBC AUTO-ENTMCNC: 33.6 G/DL (ref 31.5–36.5)
MCV RBC AUTO: 90 FL (ref 78–100)
MONOCYTES # BLD AUTO: 0.7 10E3/UL (ref 0–1.3)
MONOCYTES NFR BLD AUTO: 11 %
NEUTROPHILS # BLD AUTO: 3.6 10E3/UL (ref 1.6–8.3)
NEUTROPHILS NFR BLD AUTO: 58 %
NRBC # BLD AUTO: 0 10E3/UL
NRBC BLD AUTO-RTO: 0 /100
PLATELET # BLD AUTO: 270 10E3/UL (ref 150–450)
POTASSIUM SERPL-SCNC: 4.5 MMOL/L (ref 3.4–5.3)
PROT SERPL-MCNC: 7.4 G/DL (ref 6.4–8.3)
RBC # BLD AUTO: 4.26 10E6/UL (ref 3.8–5.2)
SODIUM SERPL-SCNC: 138 MMOL/L (ref 135–145)
T4 FREE SERPL-MCNC: 1.1 NG/DL (ref 0.9–1.7)
TSH SERPL DL<=0.005 MIU/L-ACNC: 0.03 UIU/ML (ref 0.3–4.2)
WBC # BLD AUTO: 6.2 10E3/UL (ref 4–11)

## 2024-09-16 PROCEDURE — 36415 COLL VENOUS BLD VENIPUNCTURE: CPT

## 2024-09-16 PROCEDURE — 84443 ASSAY THYROID STIM HORMONE: CPT

## 2024-09-16 PROCEDURE — 99215 OFFICE O/P EST HI 40 MIN: CPT

## 2024-09-16 PROCEDURE — 77067 SCR MAMMO BI INCL CAD: CPT | Mod: GC | Performed by: RADIOLOGY

## 2024-09-16 PROCEDURE — 84439 ASSAY OF FREE THYROXINE: CPT

## 2024-09-16 PROCEDURE — 77063 BREAST TOMOSYNTHESIS BI: CPT | Mod: GC | Performed by: RADIOLOGY

## 2024-09-16 PROCEDURE — 80053 COMPREHEN METABOLIC PANEL: CPT

## 2024-09-16 PROCEDURE — 85004 AUTOMATED DIFF WBC COUNT: CPT

## 2024-09-16 PROCEDURE — 99213 OFFICE O/P EST LOW 20 MIN: CPT

## 2024-09-16 ASSESSMENT — PAIN SCALES - GENERAL: PAINLEVEL: NO PAIN (0)

## 2024-09-16 NOTE — Clinical Note
9/16/2024      Remedios Osorio  [address]  [place] [address]      Dear Colleague,    Thank you for referring your patient, Remedios Osorio, to the [facility]. Please see a copy of my visit note below.    MEDICAL ONCOLOGY FOLLOW-UP PATIENT VISIT:    NAME: Remedios Osorio     DATE: 2/19/2024    PRIMARY CARE PHYSICIAN: Kae Caal    PATIENT ID: Clinical Stage II-B Breast Cancer    Oncology History: Remedios Osorio is a 68 year old female with a h/o stage IIIa, [id number], ER positive, NM positive, HER2 non-amplified invasive ductal carcinoma of the left breast. Her PCP palpated a mass in the lower outer left breast in 01/2016. Diagnostic mammogram and ultrasound showed a 3.3 cm mass at 4:30, 8 cm from the nipple. She was also found to have multiple abnormal left axillary lymph nodes. The largest one was 2.4 cm. Biopsy of her left breast mass demonstrated invasive ductal carcinoma, grade 3 with extensive tumor necrosis. Axillary LN biopsy was also stim and the other is called GRA forte.  Positive for malignancy. The tumor cells were strongly positive for estrogen receptor (> 95%) and moderate to strongly positive for progesterone receptor (60-70%). HER2/lizz was non-amplified by FISH.  PET/CT showed the left breast mass, 5 hypermetabolic left axillary lymph nodes, indeterminate left cervical chain lymph nodes, and an indeterminate hypodensity in the dome of the liver. She received 11 cycles of weekly Taxol, the 12th was cancelled due to neuropathy. She received one cycle of ddAC but did not tolerate it due to fatigue and generalized weakness. She declined further chemotherapy.    Left breast lumpectomy and left axillary lymph node dissection was performed by Dr. Amezcua on 6/29/16.  Pathology showed residual 1.6 cm grade 2, IDC in the left breast.  Surgical margins were negative.  Invasive tumor cellularity of 30%.  Tumor infiltrating lymphocytes were estimated at 50%.  6/19  excised lymph nodes were involved with malignancy.  The largest lymph node metastasis measured 3.9 cm and had a 1 mm area of extranodal extension.  Minimal treatment related changes were noted in the lymph nodes.  Pathologic staging was [id number], or stage IIIa.  [facility] residual cancer burden was Class III.  Adjuvant Xeloda was recommended, she declined.  She completed radiation on 10/17/16.  She declined zometa for prevention of bone metastases.  She has been on letrozole since early 09/2016.    Interim History:   Martha returns clinic today for routine breast cancer follow-up.  She reports that she is doing okay.  Sadly she had to put her dog down in May and this has been very difficult for her.  She also is stressed at work.  She endorses fatigue which she suggests is likely multifactorial and in part to grief.  She continues to work with homeopathic medicine and is on several supplementations.  She takes supplements for her thyroid-GTA-forte for thyroid. Has upped from 2 pills to 4 pills in the past month. Detoxidine iodine 3 drops under the tongue.  She is taking [employer] AURELIAN[employer] and melatonin for sleep.  Additionally she continues on vitamin C, vitamin D, calcium, magnesium, and vitamin B.  She does not have any particularly new concerns for lumps or bumps in her breast but does notices the warmer weather her lymphedema seems to be increasing.  She is not wearing her compression garments regularly.  She reports that she has gained weight.  She is not interested in any injectables or referrals to weight management clinic.  She is not very active outside of work where she walks 2 laps during her breaks.    Her niece continues to live with her for college.  She has no new bone pains or aches.  Her left foot has been improved with injections.  She has mild arthralgias in her knees. No nausea or vomiting. No bowel or bladder concerns. Endorses hot flashes. Endorses vaginal dryness but does not find it particularly  bothersome.      ROS: 10 point ROS neg other than the symptoms noted above in the HPI.      PAST MEDICAL HISTORY:   Past Medical History:   Diagnosis Date    ABNL LIVER FUNC STUDY  W/ MONO  5/01    Breast cancer (H)     CHR BLOOD LOSS ANEMIA DUE TO FIBROIDS 6/9/2005    ELEVATED HBA1C 6.2% 6/05 8/6/2005    HX MUCOUS POLYPS OF CERVIX  2000    HYPERLIPIDEMIA      Infectious mononucleosis 5/01    MENORRHAGIA     MIGRAINE HEADACHES     MORBID OBESITY BMI 44.79 6/05 6/12/2005    UTERINE LEIOMYOMA  2/7/2003       PAST SURGICAL HISTORY:  Past Surgical History:   Procedure Laterality Date    CL AFF SURGICAL PATHOLOGY  2005    UTERINE LEIOMYOMA  [D25.9]    HC BIOPSY/EXCIS CERVICAL LESION W/WO FULGURATION  08-15-00,12-    endocervical polypectomy    HC TOOTH EXTRACTION W/FORCEP      wisdom teeth    LUMPECTOMY BREAST WITH SENTINEL NODE, COMBINED Left 6/29/2016    Segmental mastectomy with axillary lymph node dissection    REMOVE PORT VASCULAR ACCESS Right 6/29/2016    Procedure: REMOVE PORT VASCULAR ACCESS;  Surgeon: Gianna Amezcua MD;  Location:  OR     MEDS:  Current Outpatient Medications   Medication Sig Dispense Refill    Calcium-Magnesium-Vitamin D (CALCIUM MAGNESIUM PO) Take 1 tablet by mouth daily      diclofenac (VOLTAREN) 1 % topical gel Apply 2 g topically 4 times daily 100 g 3    fluocinonide (LIDEX) 0.05 % external cream Apply topically 2 times daily To hands as needed with daily moisturizer 60 g 1    letrozole (FEMARA) 2.5 MG tablet TAKE ONE TABLET BY MOUTH ONCE DAILY 90 tablet 3    melatonin 3 MG CAPS Take 3 mg by mouth at bedtime      Multiple Minerals-Vitamins (CALCIUM CITRATE PLUS/MAGNESIUM PO) 1scoop per day per patient      order for DME Equipment being ordered: Compression sleeve, glove, and bra (Patient not taking: Reported on 8/28/2023) 1 Device 3    UNABLE TO FIND Take 2 capsules by mouth daily MEDICATION NAME: GTA-Forte      UNABLE TO FIND Take 2 capsules by mouth daily MEDICATION NAME:  Marcelo-stim dietary supplement      VITAMIN D PO Take 1 tablet by mouth daily       No current facility-administered medications for this visit.     Facility-Administered Medications Ordered in Other Visits   Medication Dose Route Frequency Provider Last Rate Last Admin    lidocaine BUFFERED 1 % solution 10 mL  10 mL Injection Once Kae Caal MD           ALLERGIES:  Allergies   Allergen Reactions    Benadryl [Diphenhydramine] Other (See Comments)     Pt had severe hypotension, nausea and vasovagal type reaction to IV Benadryl.   Give PO only.     Cats     Keflex [Cephalexin]      rash        PHYSICAL EXAM:  BP (!) 151/90   Pulse 101   Temp 98.9  F (37.2  C) (Oral)   Resp 16   Wt 115 kg (253 lb 8 oz)   LMP 10/28/2009   SpO2 95%   BMI 45.37 kg/m    LMP 10/28/2009   General:  Morbidly obese adult female in NAD.  Alert and oriented x 3.  HEENT:  Normocephalic.  Sclera anicteric.  MMM.  Lymph:  No palpable cervical, supraclavicular, or axillary LAD.  CV: RRR, no audible m/r/g.  Resp: CTAB, normal work of breathing on room air   Breast:  Bilateral breasts are large and of normal fibroglandular density.  There are no discretely palpable masses in either breast.  Left breast with enhancement of pores and skin thickening c/w lymphedema.  This is most prominent over the inferior aspect of the breast.  Bilateral nipples are everted and without discharge.  Ext:  No pitting edema of the bilateral lower extremities.    Musculo:  Full ROM of the bilateral upper extremities.  Neuro:  Cranial nerves are grossly intact.  Gait is stable.  Able to climb on the exam table unaided.  Psych:  Mood and affect appear normal.  Skin:  No visible concerning skin rashes or lesions.    LABS REVIEWED THIS VISIT:  I personally reviewed the below laboratories:    2/19/2024 Labs:  Electrolytes, kidney, and liver tests are wnl.  TSH is low at 0.07 uIU/mL  Free T4 is wnl at 1.01 ng/dL  Blood counts are wnl.    IMPRESSION/PLAN: Ms.  Devon is a 68 year old female with a h/o stage IIIa, Q3dY0W5, ER/UT positive, HER2 negative left breast cancer. She is s/p 11 weeks of weekly taxol, 1 cycle of ddAC, left breast lumpectomy, ALND, and adjuvant radiation.  She has been on curative intent adjuvant treatment with letrozole since early September, 2016.      1.  Stage III breast cancer:  Remedios is 7 years, 8 months out from excision of a left breast cancer.  She is on adjuvant curative intent treatment with letrozole.  She is tolerating the medication well.  We plan to continue letrozole to complete a total 10 years of endocrine therapy (through 09/2026).  However, according to the EBCTCG meta-analysis published in the NEJ in 2017 had she stopped her endocrine therapy at 5 years, her risk of recurrence between years 5-20 would have been approximately 49%.  According to MA17.R, 10 years of AI will reduce this risk by 1/3, making her risk still around 33-34%.  Given this high risk of recurrence it would be reasonable to stay on AI longer than 10 years if bone health allows.    She is asymptomatic of disease recurrence on history today and clinical exam is without concerning findings. There is specifically no palpable lymph nodes in the right axilla in the area of patient reported intermittent discomfort. I will see her back in 6 months.    2.  Bone Health:  She is at risk for bone loss on aromatase inhibitor therapy.  DEXA 02/2023 with a lowest T-score of 0 in the left femoral neck.     She is s/p treatment with 2 years Zometa from 09/2019 - 10/2021, she did not tolerate the medication with significant joint pains following each dose.  She is taking a vitamin D supplement.    3.  Morbid obesity:  BMI today is 44.35.  Patients with increased BMI have worse breast cancer outcomes.  She has decreased mobility due to severe osteoarthritis of the feet. We discussed 80% of weight loss is dietary.  She has previously declined referral to the medication for weight  loss clinic and again declines this today.      4.  Hypothyroidism:  She declines treatment with levothyroxine and instead has been taking supplements advised by her sister who is a homeopath.  TSH and free T4 today are c/w clinical hyperthyroidism.      5.  Left breast lymphedema:  Recommend wearing a compressive bra as much as able.  She confirms she has one that fits.    6.  Follow Up:  Labs and return visit in 6 months.            Again, thank you for allowing me to participate in the care of your patient.        Sincerely,        Dana Cárdenas PA-C

## 2024-09-16 NOTE — NURSING NOTE
Chief Complaint   Patient presents with    Oncology Clinic Visit     Breast cancer metastasized to axillary lymph node, left     Blood Draw     Vitals, blood drawn via VPT by LPN. Pt checked into neidat.      LYNN Peña LPN

## 2024-09-16 NOTE — NURSING NOTE
"Oncology Rooming Note    September 16, 2024 2:55 PM   Remedios Osorio is a 69 year old female who presents for:    Chief Complaint   Patient presents with    Oncology Clinic Visit     Breast cancer metastasized to axillary lymph node, left     Blood Draw     Vitals, blood drawn via VPT by LPN. Pt checked into appt.      Initial Vitals: BP (!) 151/90   Pulse 101   Temp 98.9  F (37.2  C) (Oral)   Resp 16   Wt 115 kg (253 lb 8 oz)   LMP 10/28/2009   SpO2 95%   BMI 45.37 kg/m   Estimated body mass index is 45.37 kg/m  as calculated from the following:    Height as of 8/16/21: 1.592 m (5' 2.68\").    Weight as of this encounter: 115 kg (253 lb 8 oz). Body surface area is 2.26 meters squared.  No Pain (0) Comment: Data Unavailable   Patient's last menstrual period was 10/28/2009.  Allergies reviewed: Yes  Medications reviewed: Yes    Medications: Medication refills not needed today.  Pharmacy name entered into [email or web address]:    [facility] [place] - [place], [place] - [address] [facility].  [id number] - [place], [place] [address] [facility] [address] [place], [place] - [address] [facility] [place] - [address] [facility] [place], [place] [address] [facility] [place], [place] [address]  [facility] [place], [place] - [address] [facility] [place], [place] - [address] [facility] [address] [place] [address] - [address] [facility]    Frailty Screening:   Is the patient here for a new oncology consult visit in cancer care? 2. No      Clinical concerns: pt states she has swelling in breast and left side. Hot and humid days are  worse for lymphedema.     Pt states experiencing fatigue, but adjustment of thyroid supplement helps.       Danilo Castellanos              "

## 2024-09-27 ENCOUNTER — THERAPY VISIT (OUTPATIENT)
Dept: PHYSICAL THERAPY | Facility: CLINIC | Age: 69
End: 2024-09-27
Payer: COMMERCIAL

## 2024-09-27 DIAGNOSIS — I89.0 LYMPHEDEMA OF LEFT UPPER EXTREMITY: ICD-10-CM

## 2024-09-27 DIAGNOSIS — C50.912 BREAST CANCER METASTASIZED TO AXILLARY LYMPH NODE, LEFT (H): ICD-10-CM

## 2024-09-27 DIAGNOSIS — I89.0 LYMPHEDEMA OF BREAST: Primary | ICD-10-CM

## 2024-09-27 DIAGNOSIS — C77.3 BREAST CANCER METASTASIZED TO AXILLARY LYMPH NODE, LEFT (H): ICD-10-CM

## 2024-09-27 PROCEDURE — 97140 MANUAL THERAPY 1/> REGIONS: CPT | Mod: GP | Performed by: PHYSICAL THERAPIST

## 2024-09-27 PROCEDURE — 97161 PT EVAL LOW COMPLEX 20 MIN: CPT | Mod: GP | Performed by: PHYSICAL THERAPIST

## 2024-09-27 PROCEDURE — 97110 THERAPEUTIC EXERCISES: CPT | Mod: GP | Performed by: PHYSICAL THERAPIST

## 2024-09-27 PROCEDURE — 97535 SELF CARE MNGMENT TRAINING: CPT | Mod: GP | Performed by: PHYSICAL THERAPIST

## 2024-09-27 NOTE — PROGRESS NOTES
PHYSICAL THERAPY EVALUATION  Type of Visit: Evaluation       Fall Risk Screen:  Fall screen completed by: PT  Have you fallen 2 or more times in the past year?: No  Have you fallen and had an injury in the past year?: No  Is patient a fall risk?: No    Subjective       Presenting condition or subjective complaint:    Date of onset: 06/29/16    Relevant medical history:   History of L breast cancer s/p lumpectomy and ALND. L breast/trunk or upper swelling and one episode of hand swelling a few years ago. Pt had adjuvant chemo, surgery, radiation, then on Zometa to prevent bone mets and letrozole.   Current fatigue and weight gain in the past few years due to thyroid condition that has been hard to manage  Dates & types of surgery:  6/29/16 breast surgery.  Pt also had radiation completed 10/17/16.     Prior diagnostic imaging/testing results:       Prior therapy history for the same diagnosis, illness or injury:    Wore a compression bra at night for a few evenings in a row and seems to help. Pt does have a sleeve and glove, might use for the rare time that she flies but not doesn't travel that much.     Prior Level of Function  Transfers: Independent  Ambulation: Independent  ADL: Independent  IADL: Driving, Finances, Housekeeping, Laundry, Meal preparation, Medication management, Work, Yard work    Living Environment  Social support:   niece is staying during college  Type of home:   2 story home with basement, 12-14 stairs for each level with railing    Employment:    Works in Home[employer], does a lot of sitting at desk, takes walk breaks  Hobbies/Interests:  flute, gardening    Patient goals for therapy:  Get rid of this swelling    Pain assessment: Pain denied, heaviness in the breast and skin is tight.     Objective       EDEMA EVALUATION    Cognitive Status Examination  Orientation: Oriented to person, place and time   Level of Consciousness: Alert  Follows Commands and Answers Questions: 100% of the  time  Personal Safety and Judgement: Intact  Memory: Intact    EDEMA  Skin Condition: Intact Breast is tender laterally, tissue is soft but more tissue than other side. Lower medial quadrant of breast more firm above inframammary fold. Pt with deep tissue restriction in the L axilla and very tight pectoral muscles causing slight anterior tilt of the scapula  Scar: Yes, not remarkable, healed  Stemmer Sign: -  Ulceration: No    GIRTH MEASUREMENTS: Refer to separate girth measurement flowsheet for specific measurements.    No arm swelling present today, volume measurements not taken but will be next session for baseline. Pt has experienced intermittent swelling, recommended to have new sleeve and glove and will take measurements to guide use of compression.     RANGE OF MOTION:  B shoulder ROM is decreased, Pt R shoulder pain/injury and decreased ROM noted into both abduction about 100 degrees and flexion about 120 degrees.  Will further assess in coming visits. Pt did not report functional deficits in using arms.   STRENGTH: UE Strength WFL  POSTURE:  Forward head and shoulders, increased lordosis, tilted R scapula  PALPATION: tender in lateral L breast, axilla and pec as well as tissue of anterior chest  ACTIVITIES OF DAILY LIVING: independent  BED MOBILITY: Independent, assist from the mat due to back pain with transition  TRANSFERS: Independent  GAIT/LOCOMOTION: Independent  BALANCE: WFL  SENSATION:  Pt does have neuropathy, with numbness in finger tips and also in feet. Light touch on feel not formally assessed today    Assessment & Plan   CLINICAL IMPRESSIONS  Medical Diagnosis: Breast cancer metastized to axillary node    Treatment Diagnosis: Lymphedema, shoulder dysfunction, soft tissue impairment, pain, fatigue   Impression/Assessment: Patient is a 69 year old female with edema, pain, faitgue complaints and impaired L shoulder motion demonstrated.  The following significant findings have been identified:  Pain, Decreased ROM/flexibility, Decreased joint mobility, Edema, Decreased activity tolerance, and Impaired posture. These impairments interfere with their ability to perform work tasks, recreational activities, and household chores as compared to previous level of function.     Clinical Decision Making (Complexity):  Clinical Presentation: Stable/Uncomplicated  Clinical Presentation Rationale: based on medical and personal factors listed in PT evaluation  Clinical Decision Making (Complexity): Low complexity    PLAN OF CARE  Treatment Interventions:  Interventions: Manual Therapy, Therapeutic Exercise, Self-Care/Home Management, fit for garments    Long Term Goals     PT Goal 1  Goal Identifier: HEP  Goal Description: Pt will be independent with Chest and shoulder stretching exercises to decrease pain in chest and prevent injury with return to previous household and leisure activity.  Target Date: 12/25/24  PT Goal 2  Goal Identifier: Edema Risk Reduction  Goal Description: Pt will report understanding of lymphedema risk factors, signs and symptoms for early identification, and risk reduction practices to all for early intervention and decrease edema burden if she were to develop lymphedema.  Target Date: 11/25/24  PT Goal 3  Goal Identifier: Lymphedema home program  Goal Description: Pt will be independent with drainage exercise, self massage, and skin care to best manage swelling to decrease symptoms.  Target Date: 12/25/24  PT Goal 4  Goal Identifier: Maintenance  Goal Description: Pt will use compression garments as instructed AND home management tools/program for long term management of chronic condition to prevent tissue fibrosis, infection, wound and other secondary sequelae  Target Date: 12/25/24      Frequency of Treatment: 1-2x/week for up to 8weeks, tapering to 1x/month  Duration of Treatment: 90 days    Recommended Referrals to Other Professionals:  compression fitting  Education Assessment:    Learner/Method: Patient;Demonstration;Pictures/Video;Reading;No Barriers to Learning;Listening  Education Comments: edema risk, treatment, stretches    Risks and benefits of evaluation/treatment have been explained.   Patient/Family/caregiver agrees with Plan of Care.     Evaluation Time:     PT Eval, Low Complexity Minutes (09304): 10       Signing Clinician: Alejandra Rivera, PT

## 2024-10-23 ENCOUNTER — THERAPY VISIT (OUTPATIENT)
Dept: PHYSICAL THERAPY | Facility: CLINIC | Age: 69
End: 2024-10-23
Payer: COMMERCIAL

## 2024-10-23 DIAGNOSIS — C50.912 BREAST CANCER METASTASIZED TO AXILLARY LYMPH NODE, LEFT (H): Primary | ICD-10-CM

## 2024-10-23 DIAGNOSIS — C77.3 BREAST CANCER METASTASIZED TO AXILLARY LYMPH NODE, LEFT (H): Primary | ICD-10-CM

## 2024-10-23 PROCEDURE — 97110 THERAPEUTIC EXERCISES: CPT | Mod: GP | Performed by: PHYSICAL THERAPIST

## 2024-10-23 PROCEDURE — 97140 MANUAL THERAPY 1/> REGIONS: CPT | Mod: GP | Performed by: PHYSICAL THERAPIST

## 2024-10-25 ENCOUNTER — THERAPY VISIT (OUTPATIENT)
Dept: PHYSICAL THERAPY | Facility: CLINIC | Age: 69
End: 2024-10-25
Payer: COMMERCIAL

## 2024-10-25 DIAGNOSIS — C77.3 BREAST CANCER METASTASIZED TO AXILLARY LYMPH NODE, LEFT (H): Primary | ICD-10-CM

## 2024-10-25 DIAGNOSIS — C50.912 BREAST CANCER METASTASIZED TO AXILLARY LYMPH NODE, LEFT (H): Primary | ICD-10-CM

## 2024-10-25 PROCEDURE — 97110 THERAPEUTIC EXERCISES: CPT | Mod: GP | Performed by: PHYSICAL THERAPIST

## 2024-10-25 PROCEDURE — 97140 MANUAL THERAPY 1/> REGIONS: CPT | Mod: GP | Performed by: PHYSICAL THERAPIST

## 2024-11-01 ENCOUNTER — THERAPY VISIT (OUTPATIENT)
Dept: PHYSICAL THERAPY | Facility: CLINIC | Age: 69
End: 2024-11-01
Payer: COMMERCIAL

## 2024-11-01 DIAGNOSIS — C50.912 BREAST CANCER METASTASIZED TO AXILLARY LYMPH NODE, LEFT (H): Primary | ICD-10-CM

## 2024-11-01 DIAGNOSIS — C77.3 BREAST CANCER METASTASIZED TO AXILLARY LYMPH NODE, LEFT (H): Primary | ICD-10-CM

## 2024-11-01 PROCEDURE — 97110 THERAPEUTIC EXERCISES: CPT | Mod: GP | Performed by: PHYSICAL THERAPIST

## 2024-11-01 PROCEDURE — 97140 MANUAL THERAPY 1/> REGIONS: CPT | Mod: GP | Performed by: PHYSICAL THERAPIST

## 2024-12-29 ENCOUNTER — HEALTH MAINTENANCE LETTER (OUTPATIENT)
Age: 69
End: 2024-12-29

## 2025-02-26 NOTE — NURSING NOTE
"Oncology Rooming Note    May 8, 2017 3:19 PM   Remedios Osorio is a 61 year old female who presents for:    Chief Complaint   Patient presents with     Oncology Clinic Visit     breast ca     Initial Vitals: /79  Pulse 79  Wt 94.9 kg (209 lb 3.2 oz)  LMP 10/28/2009  SpO2 97%  BMI 36.38 kg/m2 Estimated body mass index is 36.38 kg/(m^2) as calculated from the following:    Height as of 10/10/16: 1.615 m (5' 3.58\").    Weight as of this encounter: 94.9 kg (209 lb 3.2 oz). Body surface area is 2.06 meters squared.  Data Unavailable Comment: Data Unavailable   Patient's last menstrual period was 10/28/2009.  Allergies reviewed: Yes  Medications reviewed: Yes    Medications: Medication refills not needed today.  Pharmacy name entered into [employer]:    [facility] [place] - [place], [place] - [address] [facility].  [id number] - [place], [place] [address]  [facility] [address] [place] - [address] [facility] [place] - [address] [facility] [place] - [address] [facility] [place] [place] - [address]  [facility] [place], [place] - [address] [facility]    Clinical concerns: no Dr. Patel was NOT notified.    5 minutes for nursing intake (face to face time)     Meka Ghosh CMA              " RN called [phone] to give report. RN called daughter Derrick to notify pt transferring.

## 2025-03-11 ENCOUNTER — ONCOLOGY VISIT (OUTPATIENT)
Dept: ONCOLOGY | Facility: CLINIC | Age: 70
End: 2025-03-11
Attending: INTERNAL MEDICINE
Payer: COMMERCIAL

## 2025-03-11 ENCOUNTER — APPOINTMENT (OUTPATIENT)
Dept: LAB | Facility: CLINIC | Age: 70
End: 2025-03-11
Attending: INTERNAL MEDICINE
Payer: COMMERCIAL

## 2025-03-11 ENCOUNTER — ANCILLARY PROCEDURE (OUTPATIENT)
Dept: BONE DENSITY | Facility: CLINIC | Age: 70
End: 2025-03-11
Payer: COMMERCIAL

## 2025-03-11 VITALS
SYSTOLIC BLOOD PRESSURE: 159 MMHG | HEART RATE: 91 BPM | BODY MASS INDEX: 46.77 KG/M2 | DIASTOLIC BLOOD PRESSURE: 81 MMHG | WEIGHT: 261.3 LBS | RESPIRATION RATE: 18 BRPM | OXYGEN SATURATION: 97 %

## 2025-03-11 DIAGNOSIS — Z91.89 AT RISK FOR BONE DENSITY LOSS: ICD-10-CM

## 2025-03-11 DIAGNOSIS — I89.0 LYMPHEDEMA: ICD-10-CM

## 2025-03-11 DIAGNOSIS — Z91.89 AT RISK FOR OSTEOPOROSIS: ICD-10-CM

## 2025-03-11 DIAGNOSIS — R26.89 BALANCE PROBLEMS: ICD-10-CM

## 2025-03-11 DIAGNOSIS — E66.01 MORBID OBESITY (H): ICD-10-CM

## 2025-03-11 DIAGNOSIS — E06.3 HYPOTHYROIDISM DUE TO HASHIMOTO'S THYROIDITIS: ICD-10-CM

## 2025-03-11 DIAGNOSIS — C50.912 BREAST CANCER METASTASIZED TO AXILLARY LYMPH NODE, LEFT (H): Primary | ICD-10-CM

## 2025-03-11 DIAGNOSIS — C77.3 BREAST CANCER METASTASIZED TO AXILLARY LYMPH NODE, LEFT (H): Primary | ICD-10-CM

## 2025-03-11 LAB
ALBUMIN SERPL BCG-MCNC: 4 G/DL (ref 3.5–5.2)
ALP SERPL-CCNC: 84 U/L (ref 40–150)
ALT SERPL W P-5'-P-CCNC: 20 U/L (ref 0–50)
ANION GAP SERPL CALCULATED.3IONS-SCNC: 9 MMOL/L (ref 7–15)
AST SERPL W P-5'-P-CCNC: 21 U/L (ref 0–45)
BASOPHILS # BLD AUTO: 0.1 10E3/UL (ref 0–0.2)
BASOPHILS NFR BLD AUTO: 1 %
BILIRUB SERPL-MCNC: 0.3 MG/DL
BUN SERPL-MCNC: 14.1 MG/DL (ref 8–23)
CALCIUM SERPL-MCNC: 9.6 MG/DL (ref 8.8–10.4)
CHLORIDE SERPL-SCNC: 103 MMOL/L (ref 98–107)
CREAT SERPL-MCNC: 0.77 MG/DL (ref 0.51–0.95)
EGFRCR SERPLBLD CKD-EPI 2021: 84 ML/MIN/1.73M2
EOSINOPHIL # BLD AUTO: 0.1 10E3/UL (ref 0–0.7)
EOSINOPHIL NFR BLD AUTO: 2 %
ERYTHROCYTE [DISTWIDTH] IN BLOOD BY AUTOMATED COUNT: 13.6 % (ref 10–15)
GLUCOSE SERPL-MCNC: 107 MG/DL (ref 70–99)
HCO3 SERPL-SCNC: 26 MMOL/L (ref 22–29)
HCT VFR BLD AUTO: 40.3 % (ref 35–47)
HGB BLD-MCNC: 13.3 G/DL (ref 11.7–15.7)
IMM GRANULOCYTES # BLD: 0 10E3/UL
IMM GRANULOCYTES NFR BLD: 0 %
LYMPHOCYTES # BLD AUTO: 1.2 10E3/UL (ref 0.8–5.3)
LYMPHOCYTES NFR BLD AUTO: 20 %
MCH RBC QN AUTO: 29.6 PG (ref 26.5–33)
MCHC RBC AUTO-ENTMCNC: 33 G/DL (ref 31.5–36.5)
MCV RBC AUTO: 90 FL (ref 78–100)
MONOCYTES # BLD AUTO: 0.5 10E3/UL (ref 0–1.3)
MONOCYTES NFR BLD AUTO: 8 %
NEUTROPHILS # BLD AUTO: 3.9 10E3/UL (ref 1.6–8.3)
NEUTROPHILS NFR BLD AUTO: 69 %
NRBC # BLD AUTO: 0 10E3/UL
NRBC BLD AUTO-RTO: 0 /100
PLATELET # BLD AUTO: 257 10E3/UL (ref 150–450)
POTASSIUM SERPL-SCNC: 5 MMOL/L (ref 3.4–5.3)
PROT SERPL-MCNC: 7.2 G/DL (ref 6.4–8.3)
RBC # BLD AUTO: 4.49 10E6/UL (ref 3.8–5.2)
SODIUM SERPL-SCNC: 138 MMOL/L (ref 135–145)
T4 FREE SERPL-MCNC: 0.86 NG/DL (ref 0.9–1.7)
TSH SERPL DL<=0.005 MIU/L-ACNC: 0.23 UIU/ML (ref 0.3–4.2)
WBC # BLD AUTO: 5.7 10E3/UL (ref 4–11)

## 2025-03-11 PROCEDURE — 84439 ASSAY OF FREE THYROXINE: CPT | Performed by: INTERNAL MEDICINE

## 2025-03-11 PROCEDURE — 77081 DXA BONE DENSITY APPENDICULR: CPT | Mod: XU | Performed by: INTERNAL MEDICINE

## 2025-03-11 PROCEDURE — 99213 OFFICE O/P EST LOW 20 MIN: CPT | Performed by: INTERNAL MEDICINE

## 2025-03-11 PROCEDURE — 36415 COLL VENOUS BLD VENIPUNCTURE: CPT | Performed by: INTERNAL MEDICINE

## 2025-03-11 PROCEDURE — 84132 ASSAY OF SERUM POTASSIUM: CPT | Performed by: INTERNAL MEDICINE

## 2025-03-11 PROCEDURE — 77080 DXA BONE DENSITY AXIAL: CPT | Performed by: INTERNAL MEDICINE

## 2025-03-11 PROCEDURE — 85025 COMPLETE CBC W/AUTO DIFF WBC: CPT | Performed by: INTERNAL MEDICINE

## 2025-03-11 PROCEDURE — 82247 BILIRUBIN TOTAL: CPT | Performed by: INTERNAL MEDICINE

## 2025-03-11 PROCEDURE — 84443 ASSAY THYROID STIM HORMONE: CPT | Performed by: INTERNAL MEDICINE

## 2025-03-11 PROCEDURE — 82040 ASSAY OF SERUM ALBUMIN: CPT | Performed by: INTERNAL MEDICINE

## 2025-03-11 ASSESSMENT — PAIN SCALES - GENERAL: PAINLEVEL_OUTOF10: NO PAIN (0)

## 2025-03-11 NOTE — NURSING NOTE
"Oncology Rooming Note    March 11, 2025 1:25 PM   Remedios Osorio is a 69 year old female who presents for:    Chief Complaint   Patient presents with    Blood Draw     Labs drawn via  by vascular access    Oncology Clinic Visit    Breast Cancer     Breast cancer metastasized to axillary lymph node, left, unstaged     Initial Vitals: BP (!) 159/81   Pulse 91   Resp 18   Wt 118.5 kg (261 lb 4.8 oz)   LMP 10/28/2009   SpO2 97%   BMI 46.77 kg/m   Estimated body mass index is 46.77 kg/m  as calculated from the following:    Height as of 8/16/21: 1.592 m (5' 2.68\").    Weight as of this encounter: 118.5 kg (261 lb 4.8 oz). Body surface area is 2.29 meters squared.  No Pain (0) Comment: Data Unavailable   Patient's last menstrual period was 10/28/2009.  Allergies reviewed: Yes  Medications reviewed: Yes    Medications: Medication refills not needed today.  Pharmacy name entered into S[employer]:    [facility] [place] - [place], [place] - [address] [facility].  [id number] - [place], [place] [address] [facility] [address] [place], [place] - [address] [facility] [place] - [address] [facility] [place], [place] - [address] [facility] [place], [place] [address]  [facility] [place], [place] - [address] [facility] [place], [place] - [address] [facility] [address] - [place], [place] - [address] [facility]    Frailty Screening:   Is the patient here for a new oncology consult visit in cancer care? 2. No      Clinical concerns: Pt reports gaining 10 lbs.     Petra Cleaning, EMT     "

## 2025-03-11 NOTE — PROGRESS NOTES
MEDICAL ONCOLOGY FOLLOW-UP PATIENT VISIT:    NAME: Remedios Osorio     DATE: 3/11/2024    PRIMARY CARE PHYSICIAN: Kae Caal    PATIENT ID: Clinical Stage II-B Breast Cancer    Oncology History: Remedios Osorio is a 69 year old female with a h/o stage IIIa, [id number], ER positive, KS positive, HER2 non-amplified invasive ductal carcinoma of the left breast. Her PCP palpated a mass in the lower outer left breast in 01/2016. Diagnostic mammogram and ultrasound showed a 3.3 cm mass at 4:30, 8 cm from the nipple. She was also found to have multiple abnormal left axillary lymph nodes. The largest one was 2.4 cm. Biopsy of her left breast mass demonstrated invasive ductal carcinoma, grade 3 with extensive tumor necrosis. Axillary LN biopsy was also stim and the other is called GRA forte.  Positive for malignancy. The tumor cells were strongly positive for estrogen receptor (> 95%) and moderate to strongly positive for progesterone receptor (60-70%). HER2/lizz was non-amplified by FISH.  PET/CT showed the left breast mass, 5 hypermetabolic left axillary lymph nodes, indeterminate left cervical chain lymph nodes, and an indeterminate hypodensity in the dome of the liver. She received 11 cycles of weekly Taxol, the 12th was cancelled due to neuropathy. She received one cycle of ddAC but did not tolerate it due to fatigue and generalized weakness. She declined further chemotherapy.    Left breast lumpectomy and left axillary lymph node dissection was performed by Dr. Amezcua on 6/29/16.  Pathology showed residual 1.6 cm grade 2, IDC in the left breast.  Surgical margins were negative.  Invasive tumor cellularity of 30%.  Tumor infiltrating lymphocytes were estimated at 50%.  6/19 excised lymph nodes were involved with malignancy.  The largest lymph node metastasis measured 3.9 cm and had a 1 mm area of extranodal extension.  Minimal treatment related changes were noted in the lymph nodes.  Pathologic staging was [id number],  or stage IIIa.  [facility] residual cancer burden was Class III.  Adjuvant Xeloda was recommended, she declined.  She completed radiation on 10/17/16.  She declined zometa for prevention of bone metastases.  She has been on letrozole since early 09/2016.    Interim History:    comes into clinic today for a breast cancer on treatment visit.  She is on curative intent treatment with letrozole.  She is frustrated due to ongoing weight gain.  She states since her last visit she has gained an additional 10 pounds.  She states she eats what she believes is a healthy diet.  She does not track her food and admits that she does like sugar.  She has been trying to walk most days of the week.  She is trying to build up endurance with walking as she is going to get a new dog in June.  She notes that her thyroid is still not normal.  She was noted in 2024 to be hyperthyroid and therefore her sister stopped her natural thyroid stopped supplement.  Since then she think she has swelling the other way and is now hypothyroid.  She did see lymphedema therapy following her last visit.  She stopped seeing them after single visit as it was quite expensive.  Since she continues to have lymphedema of the left chest wall and the left arm.  She was advised to be fitted for a compression bra and a left upper extremity compression sleeve, however has not done so.  She denies new bone or joint aches or pains.  She has no cough or chest pain.  She reports increased dyspnea on exertion which she attributes to her weight.  She states she has also started to have increased balance issues where she feels off balance with walking.  She has not had any falls.  She has no associated neurologic complaints.    PAST MEDICAL HISTORY:   Past Medical History:   Diagnosis Date    ABNL LIVER FUN STUDY  W/ MONO  5/01    Breast cancer (H)     CHR BLOOD LOSS ANEMIA DUE TO FIBROIDS 6/9/2005    ELEVATED HBA1C 6.2% 6/05 8/6/2005    HX MUCOUS POLYPS OF  CERVIX  2000    HYPERLIPIDEMIA      Infectious mononucleosis 5/01    MENORRHAGIA     MIGRAINE HEADACHES     MORBID OBESITY BMI 44.79 6/05 6/12/2005    UTERINE LEIOMYOMA  2/7/2003       PAST SURGICAL HISTORY:  Past Surgical History:   Procedure Laterality Date    CL AFF SURGICAL PATHOLOGY  2005    UTERINE LEIOMYOMA  [D25.9]    HC BIOPSY/EXCIS CERVICAL LESION W/WO FULGURATION  08-15-00,12-    endocervical polypectomy    HC TOOTH EXTRACTION W/FORCEP      wisdom teeth    LUMPECTOMY BREAST WITH SENTINEL NODE, COMBINED Left 6/29/2016    Segmental mastectomy with axillary lymph node dissection    REMOVE PORT VASCULAR ACCESS Right 6/29/2016    Procedure: REMOVE PORT VASCULAR ACCESS;  Surgeon: Gianna Amezcua MD;  Location:  OR     MEDS:  Current Outpatient Medications   Medication Sig Dispense Refill    Calcium-Magnesium-Vitamin D (CALCIUM MAGNESIUM PO) Take 1 tablet by mouth daily      diclofenac (VOLTAREN) 1 % topical gel Apply 2 g topically 4 times daily 100 g 3    fluocinonide (LIDEX) 0.05 % external cream Apply topically 2 times daily To hands as needed with daily moisturizer 60 g 1    letrozole (FEMARA) 2.5 MG tablet TAKE ONE TABLET BY MOUTH ONCE DAILY 90 tablet 3    melatonin 3 MG CAPS Take 3 mg by mouth at bedtime      Multiple Minerals-Vitamins (CALCIUM CITRATE PLUS/MAGNESIUM PO) 1scoop per day per patient      order for DME Equipment being ordered: Compression sleeve, glove, and bra (Patient not taking: Reported on 8/28/2023) 1 Device 3    UNABLE TO FIND Take 2 capsules by mouth daily MEDICATION NAME: GTA-Forte      UNABLE TO FIND Take 2 capsules by mouth daily MEDICATION NAME: Marcelo-stim dietary supplement      VITAMIN D PO Take 1 tablet by mouth daily       No current facility-administered medications for this visit.     Facility-Administered Medications Ordered in Other Visits   Medication Dose Route Frequency Provider Last Rate Last Admin    lidocaine BUFFERED 1 % solution 10 mL  10 mL Injection  Anabel Caal, Kae Smith MD           ALLERGIES:  Allergies   Allergen Reactions    Benadryl [Diphenhydramine] Other (See Comments)     Pt had severe hypotension, nausea and vasovagal type reaction to IV Benadryl.   Give PO only.     Cats     Keflex [Cephalexin]      rash        PHYSICAL EXAM:    General:  Morbidly obese adult female in NAD.  Alert and oriented x 3.  HEENT:  Normocephalic.  Sclera anicteric.  MMM.  Lymph:  No palpable cervical, supraclavicular, or axillary LAD.  CV: RRR, no audible m/r/g.  Resp: CTAB, normal work of breathing on room air   Breast:  Bilateral breasts are large and of normal fibroglandular density.  There are no discretely palpable masses in either breast.  Left breast with enhancement of pores and skin thickening c/w lymphedema.  This is most prominent over the inferior aspect of the breast.  Bilateral nipples are everted and without discharge.  Ext:  No pitting edema of the bilateral lower extremities.    Musculo:  Full ROM of the bilateral upper extremities.  Neuro:  Cranial nerves are grossly intact.  Gait is stable.  Able to climb on the exam table unaided.  Psych:  Mood and affect appear normal.  Skin:  No visible concerning skin rashes or lesions.    LABS REVIEWED THIS VISIT:  I personally reviewed the below laboratories and images:    3/11/2025 DEXA bone density scan:      3/11/2025 Labs:      IMPRESSION/PLAN: Ms. Osorio is a 69 year old female with a h/o stage IIIa, U8vV1V6, ER/GA positive, HER2 negative left breast cancer. She is s/p 11 weeks of weekly taxol, 1 cycle of ddAC, left breast lumpectomy, ALND, and adjuvant radiation.  She has been on curative intent adjuvant treatment with letrozole since early September, 2016.      1.  Stage III breast cancer:  Remedios is 8 years, 8.5 months out from excision of a left breast cancer.  She is on adjuvant curative intent treatment with letrozole.  She is tolerating the medication well.  We plan to continue letrozole to  complete a total 10 years of endocrine therapy (through 09/2026).  However, according to the EBCTCG meta-analysis published in the NEJ in 2017 had she stopped her endocrine therapy at 5 years, her risk of recurrence between years 5-20 would have been approximately 49%.  According to MA17.R, 10 years of AI will reduce this risk by 1/3, making her risk still around 33-34%.  Given this high risk of recurrence it would be reasonable to stay on AI longer than 10 years if bone health allows.    She is asymptomatic of disease recurrence on history today and clinical exam is without concerning findings.   - I will see her back in 6 months.  - Bilateral screening mammograms 9/16/2024 or later.    2.  Bone Health:  She is at risk for bone loss on aromatase inhibitor therapy.  DEXA 02/2023 with a lowest T-score of 0 in the left femoral neck.     She is s/p treatment with 2 years Zometa from 09/2019 - 10/2021, she did not tolerate the medication with significant joint pains following each dose.  She is taking a vitamin D supplement.  - I personally reviewed the DEXA performed today which shows a lowest T-score of ...    3.  Morbid obesity:  BMI today is ...  Patients with increased BMI have worse breast cancer outcomes.  She has decreased mobility due to severe osteoarthritis of the feet. She has declined referral to the medical weight management clinic.     4.  Hypothyroidism:  She declines treatment with levothyroxine and instead has been taking supplements advised by her sister who is a homeopath.  TSH and free T4 today are c/w clinical hyperthyroidism.      5.  Left breast lymphedema:  Recommend wearing a compressive bra as much as able.  She confirms she has one that fits.    6.  Follow Up:  Labs, bilateral screening mammograms and return visit 9/22/2025 as already scheduled.   inhibitor therapy.  DEXA 02/2023 with a lowest T-score of 0 in the left femoral neck.     She is s/p treatment with 2 years Zometa from 09/2019 - 10/2021, she did not tolerate the medication with significant joint pains following each dose.  She is taking a vitamin D supplement.  - I personally reviewed the DEXA performed today which shows a lowest T-score of 0.1. Bone density is overall unchanged from prior.  Bone density in the radius decreased from -1.2 in 2023 to -1.5 in 2025.    - Recommend she take calcium 1200 mg and vitamin D 1000 IUs daily.  - Obtain 30 minutes of daily weight bearing activity such as walking.    3.  Morbid obesity:  BMI today is 46.8.  We spent some time discussing this today.  Patients with increased BMI have worse breast cancer outcomes.  She has previously declined referral to the medical weight management clinic.  We discussed medications for weight loss today; she is not interested in these.  I recommended that she weight herself daily and use an neida to track her intake as this will hold her accountable for what she is eating.  I also encouraged a diet high in lean proteins and vegetables and low in refined carbohydrates.  Discussed thyroid dysfunction can contribute to weight gain and recommend traditional management of this.    4.  Hypothyroidism:  She has declined treatment with levothyroxine and instead has been taking supplements advised by her sister who is a homeopath.  She notes she has now stopped all thyroid supplements.  TSH and free T4 checked today and are pending.    5.  Left breast lymphedema:  Referred to lymphedema clinic at last visit, she states this was too expensive.  Recommend wearing a compressive bra as much as able as well as performing daily massage.      6.  Balance issues:  suspect these are related to morbid obesity and thyroid issues.  Gait is stable in clinic today and she has no associated neurologic complaints to suggest a malignant cause.    7.   Follow Up:  Labs, bilateral screening mammograms and return visit 9/22/2025 as already scheduled.    I spent 47 minutes on the date of the encounter doing chart review, review of test results, interpretation of tests, patient visit, and documentation

## 2025-03-11 NOTE — NURSING NOTE
Chief Complaint   Patient presents with    Blood Draw     Labs drawn via  by vascular access     Labs drawn with  by vascular access. Vitals taken. Patient checked into next appointment.    Leonor Wing RN

## 2025-03-11 NOTE — Clinical Note
3/11/2025      Remedios Osorio  [address]  [place] [address]      Dear Colleague,    Thank you for referring your patient, Remedios Osorio, to the [facility]. Please see a copy of my visit note below.    MEDICAL ONCOLOGY FOLLOW-UP PATIENT VISIT:    NAME: Remedios Osorio     DATE: 3/11/2024    PRIMARY CARE PHYSICIAN: Kae Caal    PATIENT ID: Clinical Stage II-B Breast Cancer    Oncology History: Remedios Osorio is a 69 year old female with a h/o stage IIIa, [id number], ER positive, [place] positive, HER2 non-amplified invasive ductal carcinoma of the left breast. Her PCP palpated a mass in the lower outer left breast in 01/2016. Diagnostic mammogram and ultrasound showed a 3.3 cm mass at 4:30, 8 cm from the nipple. She was also found to have multiple abnormal left axillary lymph nodes. The largest one was 2.4 cm. Biopsy of her left breast mass demonstrated invasive ductal carcinoma, grade 3 with extensive tumor necrosis. Axillary LN biopsy was also stim and the other is called GRA forte.  Positive for malignancy. The tumor cells were strongly positive for estrogen receptor (> 95%) and moderate to strongly positive for progesterone receptor (60-70%). HER2/lizz was non-amplified by FISH.  PET/CT showed the left breast mass, 5 hypermetabolic left axillary lymph nodes, indeterminate left cervical chain lymph nodes, and an indeterminate hypodensity in the dome of the liver. She received 11 cycles of weekly Taxol, the 12th was cancelled due to neuropathy. She received one cycle of ddAC but did not tolerate it due to fatigue and generalized weakness. She declined further chemotherapy.    Left breast lumpectomy and left axillary lymph node dissection was performed by Dr. Amezcua on 6/29/16.  Pathology showed residual 1.6 cm grade 2, IDC in the left breast.  Surgical margins were negative.  Invasive tumor cellularity of 30%.  Tumor infiltrating lymphocytes were estimated at 50%.  6/19  excised lymph nodes were involved with malignancy.  The largest lymph node metastasis measured 3.9 cm and had a 1 mm area of extranodal extension.  Minimal treatment related changes were noted in the lymph nodes.  Pathologic staging was L9aO3M1, or stage IIIa.  [facility] residual cancer burden was Class III.  Adjuvant Xeloda was recommended, she declined.  She completed radiation on 10/17/16.  She declined zometa for prevention of bone metastases.  She has been on letrozole since early 09/2016.    Interim History:   Ms. Jacome comes into clinic today for a breast cancer on treatment visit.  She is on curative intent treatment with letrozole.  She is frustrated due to ongoing weight gain.  She states since her last visit she has gained an additional 10 pounds.  She states she eats what she believes is a healthy diet.  She does not track her food and admits that she does like sugar.  She has been trying to walk most days of the week.  She is trying to build up endurance with walking as she is going to get a new dog in June.  She notes that her thyroid is still not normal.  She was noted in 2024 to be hyperthyroid and therefore her sister stopped her natural thyroid stopped supplement.  Since then she think she has swelling the other way and is now hypothyroid.  She did see lymphedema therapy following her last visit.  She stopped seeing them after single visit as it was quite expensive.  Since she continues to have lymphedema of the left chest wall and the left arm.  She was advised to be fitted for a compression bra and a left upper extremity compression sleeve, however has not done so.  She denies new bone or joint aches or pains.  She has no cough or chest pain.  She reports increased dyspnea on exertion which she attributes to her weight.  She states she has also started to have increased balance issues where she feels off balance with walking.  She has not had any falls.  She has no associated neurologic  complaints.    PAST MEDICAL HISTORY:   Past Medical History:   Diagnosis Date    ABNL LIVER FUNC STUDY  W/ MONO  5/01    Breast cancer (H)     CHR BLOOD LOSS ANEMIA DUE TO FIBROIDS 6/9/2005    ELEVATED HBA1C 6.2% 6/05 8/6/2005    HX MUCOUS POLYPS OF CERVIX  2000    HYPERLIPIDEMIA      Infectious mononucleosis 5/01    MENORRHAGIA     MIGRAINE HEADACHES     MORBID OBESITY BMI 44.79 6/05 6/12/2005    UTERINE LEIOMYOMA  2/7/2003       PAST SURGICAL HISTORY:  Past Surgical History:   Procedure Laterality Date    CL AFF SURGICAL PATHOLOGY  2005    UTERINE LEIOMYOMA  [D25.9]    HC BIOPSY/EXCIS CERVICAL LESION W/WO FULGURATION  08-15-00,12-    endocervical polypectomy    HC TOOTH EXTRACTION W/FORCEP      wisdom teeth    LUMPECTOMY BREAST WITH SENTINEL NODE, COMBINED Left 6/29/2016    Segmental mastectomy with axillary lymph node dissection    REMOVE PORT VASCULAR ACCESS Right 6/29/2016    Procedure: REMOVE PORT VASCULAR ACCESS;  Surgeon: Gianna Amezcua MD;  Location:  OR     MEDS:  Current Outpatient Medications   Medication Sig Dispense Refill    Calcium-Magnesium-Vitamin D (CALCIUM MAGNESIUM PO) Take 1 tablet by mouth daily      diclofenac (VOLTAREN) 1 % topical gel Apply 2 g topically 4 times daily 100 g 3    fluocinonide (LIDEX) 0.05 % external cream Apply topically 2 times daily To hands as needed with daily moisturizer 60 g 1    letrozole (FEMARA) 2.5 MG tablet TAKE ONE TABLET BY MOUTH ONCE DAILY 90 tablet 3    melatonin 3 MG CAPS Take 3 mg by mouth at bedtime      Multiple Minerals-Vitamins (CALCIUM CITRATE PLUS/MAGNESIUM PO) 1scoop per day per patient      order for DME Equipment being ordered: Compression sleeve, glove, and bra (Patient not taking: Reported on 8/28/2023) 1 Device 3    UNABLE TO FIND Take 2 capsules by mouth daily MEDICATION NAME: GTA-Forte      UNABLE TO FIND Take 2 capsules by mouth daily MEDICATION NAME: Marcelo-stim dietary supplement      VITAMIN D PO Take 1 tablet by mouth daily        No current facility-administered medications for this visit.     Facility-Administered Medications Ordered in Other Visits   Medication Dose Route Frequency Provider Last Rate Last Admin    lidocaine BUFFERED 1 % solution 10 mL  10 mL Injection Once Kae Caal MD           ALLERGIES:  Allergies   Allergen Reactions    Benadryl [Diphenhydramine] Other (See Comments)     Pt had severe hypotension, nausea and vasovagal type reaction to IV Benadryl.   Give PO only.     Cats     Keflex [Cephalexin]      rash        PHYSICAL EXAM:    General:  Morbidly obese adult female in NAD.  Alert and oriented x 3.  HEENT:  Normocephalic.  Sclera anicteric.  MMM.  Lymph:  No palpable cervical, supraclavicular, or axillary LAD.  CV: RRR, no audible m/r/g.  Resp: CTAB, normal work of breathing on room air   Breast:  Bilateral breasts are large and of normal fibroglandular density.  There are no discretely palpable masses in either breast.  Left breast with enhancement of pores and skin thickening c/w lymphedema.  This is most prominent over the inferior aspect of the breast.  Bilateral nipples are everted and without discharge.  Ext:  No pitting edema of the bilateral lower extremities.    Musculo:  Full ROM of the bilateral upper extremities.  Neuro:  Cranial nerves are grossly intact.  Gait is stable.  Able to climb on the exam table unaided.  Psych:  Mood and affect appear normal.  Skin:  No visible concerning skin rashes or lesions.    LABS REVIEWED THIS VISIT:  I personally reviewed the below laboratories and images:    3/11/2025 DEXA bone density scan:      3/11/2025 Labs:      IMPRESSION/PLAN: Ms. Osorio is a 69 year old female with a h/o stage IIIa, [id number], ER/IA positive, HER2 negative left breast cancer. She is s/p 11 weeks of weekly taxol, 1 cycle of ddAC, left breast lumpectomy, ALND, and adjuvant radiation.  She has been on curative intent adjuvant treatment with letrozole since early September, 2016.       1.  Stage III breast cancer:  Remedios is 8 years, 8.5 months out from excision of a left breast cancer.  She is on adjuvant curative intent treatment with letrozole.  She is tolerating the medication well.  We plan to continue letrozole to complete a total 10 years of endocrine therapy (through 09/2026).  However, according to the EBCTCG meta-analysis published in the NEJ in 2017 had she stopped her endocrine therapy at 5 years, her risk of recurrence between years 5-20 would have been approximately 49%.  According to MA17.R, 10 years of AI will reduce this risk by 1/3, making her risk still around 33-34%.  Given this high risk of recurrence it would be reasonable to stay on AI longer than 10 years if bone health allows.    She is asymptomatic of disease recurrence on history today and clinical exam is without concerning findings.   - I will see her back in 6 months.  - Bilateral screening mammograms 9/16/2024 or later.    2.  Bone Health:  She is at risk for bone loss on aromatase inhibitor therapy.  DEXA 02/2023 with a lowest T-score of 0 in the left femoral neck.     She is s/p treatment with 2 years Zometa from 09/2019 - 10/2021, she did not tolerate the medication with significant joint pains following each dose.  She is taking a vitamin D supplement.  - I personally reviewed the DEXA performed today which shows a lowest T-score of ...    3.  Morbid obesity:  BMI today is ...  Patients with increased BMI have worse breast cancer outcomes.  She has decreased mobility due to severe osteoarthritis of the feet. She has declined referral to the medical weight management clinic.     4.  Hypothyroidism:  She declines treatment with levothyroxine and instead has been taking supplements advised by her sister who is a homeopath.  TSH and free T4 today are c/w clinical hyperthyroidism.      5.  Left breast lymphedema:  Recommend wearing a compressive bra as much as able.  She confirms she has one that fits.    6.   Follow Up:  Labs, bilateral screening mammograms and return visit 9/17/2025 or later.      Again, thank you for allowing me to participate in the care of your patient.        Sincerely,        Kanchan Patel MD    Electronically signed

## 2025-04-14 ENCOUNTER — MYC REFILL (OUTPATIENT)
Dept: ONCOLOGY | Facility: CLINIC | Age: 70
End: 2025-04-14
Payer: COMMERCIAL

## 2025-04-14 DIAGNOSIS — Z17.0 MALIGNANT NEOPLASM OF LOWER-OUTER QUADRANT OF LEFT BREAST OF FEMALE, ESTROGEN RECEPTOR POSITIVE (H): ICD-10-CM

## 2025-04-14 DIAGNOSIS — C50.512 MALIGNANT NEOPLASM OF LOWER-OUTER QUADRANT OF LEFT BREAST OF FEMALE, ESTROGEN RECEPTOR POSITIVE (H): ICD-10-CM

## 2025-04-15 RX ORDER — LETROZOLE 2.5 MG/1
1 TABLET, FILM COATED ORAL DAILY
Qty: 90 TABLET | Refills: 3 | Status: SHIPPED | OUTPATIENT
Start: 2025-04-15

## 2025-04-15 RX ORDER — LETROZOLE 2.5 MG/1
1 TABLET, FILM COATED ORAL
Qty: 90 TABLET | Refills: 3 | OUTPATIENT
Start: 2025-04-15

## 2025-04-15 NOTE — TELEPHONE ENCOUNTER
Medication:letrozole  Last prescribing provider:4/9/2024 Dr. alcaraz  Last clinic visit date: 3/11/2025 Dr. Alcaraz  Recommendations for requested medication:copied and pasted from last plan note    Any other pertinent information:routed to dr. alcaraz

## 2025-04-15 NOTE — TELEPHONE ENCOUNTER
Patient is following up regarding the refill. Pharmacy has not received and patient is getting worried.